# Patient Record
Sex: MALE | Race: WHITE | URBAN - METROPOLITAN AREA
[De-identification: names, ages, dates, MRNs, and addresses within clinical notes are randomized per-mention and may not be internally consistent; named-entity substitution may affect disease eponyms.]

---

## 2018-03-14 ENCOUNTER — DOCTOR'S OFFICE (OUTPATIENT)
Dept: URBAN - METROPOLITAN AREA CLINIC 2 | Facility: CLINIC | Age: 83
Setting detail: OPHTHALMOLOGY
End: 2018-03-14
Payer: COMMERCIAL

## 2018-03-14 ENCOUNTER — RX ONLY (RX ONLY)
Age: 83
End: 2018-03-14

## 2018-03-14 DIAGNOSIS — H34.8110: ICD-10-CM

## 2018-03-14 DIAGNOSIS — H40.1132: ICD-10-CM

## 2018-03-14 DIAGNOSIS — E11.3293: ICD-10-CM

## 2018-03-14 DIAGNOSIS — H25.11: ICD-10-CM

## 2018-03-14 PROCEDURE — 92004 COMPRE OPH EXAM NEW PT 1/>: CPT | Performed by: OPHTHALMOLOGY

## 2018-03-14 PROCEDURE — 92250 FUNDUS PHOTOGRAPHY W/I&R: CPT | Performed by: OPHTHALMOLOGY

## 2018-03-14 PROCEDURE — 92020 GONIOSCOPY: CPT | Performed by: OPHTHALMOLOGY

## 2018-03-14 ASSESSMENT — REFRACTION_MANIFEST
OS_VA1: 20/
OD_VA3: 20/
OD_VA3: 20/
OS_VA2: 20/
OS_CYLINDER: -2.50
OS_AXIS: 85
OD_VA2: 20/
OD_VA1: 20/
OS_VA3: 20/
OD_VA1: 20/
OS_VA3: 20/
OS_VA3: 20/
OS_SPHERE: +2.50
OD_VA2: 20/
OS_VA2: 20/
OS_VA1: 20/40-
OU_VA: 20/
OS_VA1: 20/
OS_VA2: 20/
OD_VA1: 20/
OD_VA2: 20/
OU_VA: 20/
OD_VA3: 20/
OU_VA: 20/

## 2018-03-14 ASSESSMENT — REFRACTION_CURRENTRX
OS_CYLINDER: -2.00
OS_OVR_VA: 20/
OS_ADD: +3.75
OD_OVR_VA: 20/
OS_AXIS: 83
OS_OVR_VA: 20/
OD_CYLINDER: -2.75
OS_SPHERE: +2.25
OD_OVR_VA: 20/
OD_ADD: +3.75
OD_AXIS: 94
OD_OVR_VA: 20/
OD_SPHERE: +1.50
OS_OVR_VA: 20/

## 2018-03-14 ASSESSMENT — CONFRONTATIONAL VISUAL FIELD TEST (CVF)
OD_FINDINGS: FULL
OS_FINDINGS: FULL

## 2018-03-14 ASSESSMENT — VISUAL ACUITY
OS_BCVA: 20/
OD_BCVA: 20/

## 2018-03-14 ASSESSMENT — SPHEQUIV_DERIVED: OS_SPHEQUIV: 1.25

## 2019-04-27 PROBLEM — H34.8110: Status: ACTIVE | Noted: 2018-03-14

## 2019-04-27 PROBLEM — H40.1132 PRIMARY OPEN ANGLE GLAUCOMA; BOTH EYES MODERATE: Status: ACTIVE | Noted: 2018-03-14

## 2019-04-27 PROBLEM — H25.11: Status: ACTIVE | Noted: 2018-03-14

## 2019-04-27 PROBLEM — E11.3293 DM TYPE 2; BOTH MILD WITHOUT ME: Status: ACTIVE | Noted: 2018-03-14

## 2019-05-06 ENCOUNTER — OFFICE (OUTPATIENT)
Dept: URBAN - METROPOLITAN AREA CLINIC 109 | Facility: CLINIC | Age: 84
Setting detail: OPHTHALMOLOGY
End: 2019-05-06
Payer: COMMERCIAL

## 2019-05-06 DIAGNOSIS — H18.59: ICD-10-CM

## 2019-05-06 DIAGNOSIS — E11.3293: ICD-10-CM

## 2019-05-06 DIAGNOSIS — H02.834: ICD-10-CM

## 2019-05-06 DIAGNOSIS — H34.8110: ICD-10-CM

## 2019-05-06 DIAGNOSIS — H40.1132: ICD-10-CM

## 2019-05-06 DIAGNOSIS — H02.835: ICD-10-CM

## 2019-05-06 DIAGNOSIS — H02.401: ICD-10-CM

## 2019-05-06 DIAGNOSIS — H02.831: ICD-10-CM

## 2019-05-06 DIAGNOSIS — H02.832: ICD-10-CM

## 2019-05-06 DIAGNOSIS — H25.11: ICD-10-CM

## 2019-05-06 DIAGNOSIS — H34.8112: ICD-10-CM

## 2019-05-06 PROCEDURE — 92133 CPTRZD OPH DX IMG PST SGM ON: CPT | Performed by: OPHTHALMOLOGY

## 2019-05-06 PROCEDURE — 92083 EXTENDED VISUAL FIELD XM: CPT | Performed by: OPHTHALMOLOGY

## 2019-05-06 PROCEDURE — 92020 GONIOSCOPY: CPT | Performed by: OPHTHALMOLOGY

## 2019-05-06 PROCEDURE — 92014 COMPRE OPH EXAM EST PT 1/>: CPT | Performed by: OPHTHALMOLOGY

## 2019-05-06 PROCEDURE — 76514 ECHO EXAM OF EYE THICKNESS: CPT | Performed by: OPHTHALMOLOGY

## 2019-05-06 PROCEDURE — 92250 FUNDUS PHOTOGRAPHY W/I&R: CPT | Performed by: OPHTHALMOLOGY

## 2019-05-06 ASSESSMENT — REFRACTION_MANIFEST
OU_VA: 20/
OD_VA3: 20/
OD_VA3: 20/
OU_VA: 20/
OS_AXIS: 85
OD_VA1: 20/
OS_VA3: 20/
OS_VA2: 20/
OS_VA3: 20/
OD_VA2: 20/
OS_VA1: 20/
OD_VA2: 20/
OS_CYLINDER: -2.75
OS_VA2: 20/
OD_VA1: 20/
OS_VA1: 20/40-
OS_SPHERE: +2.50

## 2019-05-06 ASSESSMENT — VISUAL ACUITY
OS_BCVA: 20/CF
OD_BCVA: 20/40-2

## 2019-05-06 ASSESSMENT — REFRACTION_CURRENTRX
OS_OVR_VA: 20/
OS_OVR_VA: 20/
OS_ADD: +3.75
OS_AXIS: 83
OD_OVR_VA: 20/
OD_OVR_VA: 20/
OD_SPHERE: +1.50
OD_OVR_VA: 20/
OS_OVR_VA: 20/
OS_CYLINDER: -2.00
OD_AXIS: 94
OD_ADD: +3.75
OS_SPHERE: +2.25
OD_CYLINDER: -2.75

## 2019-05-06 ASSESSMENT — PACHYMETRY
OD_CT_CORRECTION: 1
OS_CT_CORRECTION: 1
OS_CT_UM: 520
OD_CT_UM: 529

## 2019-05-06 ASSESSMENT — SPHEQUIV_DERIVED: OS_SPHEQUIV: 1.125

## 2019-05-06 ASSESSMENT — CONFRONTATIONAL VISUAL FIELD TEST (CVF)
OD_FINDINGS: FULL
OS_FINDINGS: FULL

## 2019-05-06 ASSESSMENT — LID POSITION - COMMENTS
OD_COMMENTS: GOOD LEVATOR FUNCTION
OS_COMMENTS: GOOD LEVATOR FUNCTION

## 2019-05-06 ASSESSMENT — LID POSITION - PTOSIS: OD_PTOSIS: RUL T 1+

## 2019-05-28 ENCOUNTER — RX ONLY (RX ONLY)
Age: 84
End: 2019-05-28

## 2019-05-28 ENCOUNTER — OFFICE (OUTPATIENT)
Dept: URBAN - METROPOLITAN AREA CLINIC 109 | Facility: CLINIC | Age: 84
Setting detail: OPHTHALMOLOGY
End: 2019-05-28
Payer: COMMERCIAL

## 2019-05-28 DIAGNOSIS — H34.8110: ICD-10-CM

## 2019-05-28 DIAGNOSIS — E11.3293: ICD-10-CM

## 2019-05-28 DIAGNOSIS — H40.1132: ICD-10-CM

## 2019-05-28 DIAGNOSIS — H25.11: ICD-10-CM

## 2019-05-28 PROCEDURE — 92014 COMPRE OPH EXAM EST PT 1/>: CPT | Performed by: OPHTHALMOLOGY

## 2019-05-28 ASSESSMENT — LID POSITION - PTOSIS: OD_PTOSIS: RUL T 1+

## 2019-05-28 ASSESSMENT — REFRACTION_CURRENTRX
OD_AXIS: 94
OS_OVR_VA: 20/
OD_OVR_VA: 20/
OS_SPHERE: +2.25
OS_AXIS: 83
OD_SPHERE: +1.50
OS_CYLINDER: -2.00
OD_OVR_VA: 20/
OS_ADD: +3.75
OD_CYLINDER: -2.75
OD_OVR_VA: 20/
OD_ADD: +3.75
OS_OVR_VA: 20/
OS_OVR_VA: 20/

## 2019-05-28 ASSESSMENT — REFRACTION_MANIFEST
OD_VA1: 20/
OU_VA: 20/
OD_VA2: 20/
OS_VA1: 20/40-
OS_VA2: 20/
OS_CYLINDER: -2.75
OS_VA2: 20/
OD_VA2: 20/
OS_SPHERE: +2.50
OD_VA3: 20/
OS_VA1: 20/
OS_AXIS: 85
OS_VA3: 20/
OU_VA: 20/
OS_VA3: 20/
OD_VA3: 20/
OD_VA1: 20/

## 2019-05-28 ASSESSMENT — SPHEQUIV_DERIVED: OS_SPHEQUIV: 1.125

## 2019-05-28 ASSESSMENT — LID POSITION - COMMENTS
OD_COMMENTS: GOOD LEVATOR FUNCTION
OS_COMMENTS: GOOD LEVATOR FUNCTION

## 2019-05-28 ASSESSMENT — CONFRONTATIONAL VISUAL FIELD TEST (CVF)
OS_FINDINGS: FULL
OD_FINDINGS: FULL

## 2019-05-28 ASSESSMENT — VISUAL ACUITY
OD_BCVA: 20/50
OS_BCVA: 20/CF

## 2019-06-25 ENCOUNTER — OFFICE (OUTPATIENT)
Dept: URBAN - METROPOLITAN AREA CLINIC 109 | Facility: CLINIC | Age: 84
Setting detail: OPHTHALMOLOGY
End: 2019-06-25
Payer: COMMERCIAL

## 2019-06-25 DIAGNOSIS — H18.59: ICD-10-CM

## 2019-06-25 DIAGNOSIS — H02.832: ICD-10-CM

## 2019-06-25 DIAGNOSIS — H25.11: ICD-10-CM

## 2019-06-25 DIAGNOSIS — H02.835: ICD-10-CM

## 2019-06-25 DIAGNOSIS — H40.1132: ICD-10-CM

## 2019-06-25 DIAGNOSIS — E11.3293: ICD-10-CM

## 2019-06-25 DIAGNOSIS — H02.834: ICD-10-CM

## 2019-06-25 DIAGNOSIS — H02.831: ICD-10-CM

## 2019-06-25 DIAGNOSIS — H02.401: ICD-10-CM

## 2019-06-25 DIAGNOSIS — H34.8110: ICD-10-CM

## 2019-06-25 PROCEDURE — 92014 COMPRE OPH EXAM EST PT 1/>: CPT | Performed by: OPHTHALMOLOGY

## 2019-06-25 ASSESSMENT — REFRACTION_MANIFEST
OS_SPHERE: +2.50
OD_VA3: 20/
OS_VA2: 20/
OS_VA3: 20/
OD_VA2: 20/
OD_VA2: 20/
OD_VA1: 20/
OS_VA3: 20/
OS_CYLINDER: -2.75
OU_VA: 20/
OS_AXIS: 85
OS_VA1: 20/40-
OD_VA1: 20/
OS_VA1: 20/
OU_VA: 20/
OD_VA3: 20/
OS_VA2: 20/

## 2019-06-25 ASSESSMENT — REFRACTION_CURRENTRX
OS_ADD: +3.75
OD_SPHERE: +1.50
OD_OVR_VA: 20/
OD_OVR_VA: 20/
OD_AXIS: 94
OS_SPHERE: +2.25
OD_CYLINDER: -2.75
OS_OVR_VA: 20/
OD_ADD: +3.75
OS_OVR_VA: 20/
OD_OVR_VA: 20/
OS_OVR_VA: 20/
OS_AXIS: 83
OS_CYLINDER: -2.00

## 2019-06-25 ASSESSMENT — LID POSITION - PTOSIS: OD_PTOSIS: RUL T 1+

## 2019-06-25 ASSESSMENT — CONFRONTATIONAL VISUAL FIELD TEST (CVF)
OD_FINDINGS: FULL
OS_FINDINGS: FULL

## 2019-06-25 ASSESSMENT — VISUAL ACUITY
OD_BCVA: 20/50+2
OS_BCVA: CF 2FT

## 2019-06-25 ASSESSMENT — SPHEQUIV_DERIVED: OS_SPHEQUIV: 1.125

## 2019-06-25 ASSESSMENT — LID POSITION - COMMENTS
OS_COMMENTS: GOOD LEVATOR FUNCTION
OD_COMMENTS: GOOD LEVATOR FUNCTION

## 2019-08-27 ENCOUNTER — OFFICE (OUTPATIENT)
Dept: URBAN - METROPOLITAN AREA CLINIC 109 | Facility: CLINIC | Age: 84
Setting detail: OPHTHALMOLOGY
End: 2019-08-27
Payer: COMMERCIAL

## 2019-08-27 DIAGNOSIS — H34.8110: ICD-10-CM

## 2019-08-27 DIAGNOSIS — H02.834: ICD-10-CM

## 2019-08-27 DIAGNOSIS — H02.401: ICD-10-CM

## 2019-08-27 DIAGNOSIS — H40.1132: ICD-10-CM

## 2019-08-27 DIAGNOSIS — H25.11: ICD-10-CM

## 2019-08-27 DIAGNOSIS — H18.59: ICD-10-CM

## 2019-08-27 DIAGNOSIS — H02.832: ICD-10-CM

## 2019-08-27 DIAGNOSIS — H02.835: ICD-10-CM

## 2019-08-27 DIAGNOSIS — H02.831: ICD-10-CM

## 2019-08-27 DIAGNOSIS — E11.3293: ICD-10-CM

## 2019-08-27 PROCEDURE — 92012 INTRM OPH EXAM EST PATIENT: CPT | Performed by: OPHTHALMOLOGY

## 2019-08-27 ASSESSMENT — CONFRONTATIONAL VISUAL FIELD TEST (CVF)
OD_FINDINGS: FULL
OS_FINDINGS: FULL

## 2019-08-27 ASSESSMENT — REFRACTION_CURRENTRX
OD_OVR_VA: 20/
OS_OVR_VA: 20/
OD_ADD: +3.75
OS_AXIS: 83
OS_ADD: +3.75
OS_CYLINDER: -2.00
OS_OVR_VA: 20/
OS_OVR_VA: 20/
OD_CYLINDER: -2.75
OD_AXIS: 94
OD_OVR_VA: 20/
OS_SPHERE: +2.25
OD_SPHERE: +1.50
OD_OVR_VA: 20/

## 2019-08-27 ASSESSMENT — REFRACTION_MANIFEST
OS_VA3: 20/
OD_VA2: 20/
OS_VA1: 20/
OD_VA1: 20/
OS_VA2: 20/
OU_VA: 20/
OU_VA: 20/
OS_AXIS: 85
OD_VA3: 20/
OS_VA3: 20/
OS_VA1: 20/40-
OS_VA2: 20/
OD_VA2: 20/
OS_SPHERE: +2.50
OS_CYLINDER: -2.75
OD_VA1: 20/
OD_VA3: 20/

## 2019-08-27 ASSESSMENT — VISUAL ACUITY
OD_BCVA: 20/50+2
OS_BCVA: CF 2FT

## 2019-08-27 ASSESSMENT — LID POSITION - PTOSIS: OD_PTOSIS: RUL T 1+

## 2019-08-27 ASSESSMENT — LID POSITION - COMMENTS
OD_COMMENTS: GOOD LEVATOR FUNCTION
OS_COMMENTS: GOOD LEVATOR FUNCTION

## 2019-08-27 ASSESSMENT — SPHEQUIV_DERIVED: OS_SPHEQUIV: 1.125

## 2019-10-29 ENCOUNTER — RX ONLY (RX ONLY)
Age: 84
End: 2019-10-29

## 2019-10-29 ENCOUNTER — OFFICE (OUTPATIENT)
Dept: URBAN - METROPOLITAN AREA CLINIC 109 | Facility: CLINIC | Age: 84
Setting detail: OPHTHALMOLOGY
End: 2019-10-29
Payer: COMMERCIAL

## 2019-10-29 DIAGNOSIS — H40.1132: ICD-10-CM

## 2019-10-29 DIAGNOSIS — H52.13: ICD-10-CM

## 2019-10-29 DIAGNOSIS — H34.8110: ICD-10-CM

## 2019-10-29 DIAGNOSIS — H52.4: ICD-10-CM

## 2019-10-29 DIAGNOSIS — H02.831: ICD-10-CM

## 2019-10-29 DIAGNOSIS — H02.835: ICD-10-CM

## 2019-10-29 DIAGNOSIS — H18.59: ICD-10-CM

## 2019-10-29 DIAGNOSIS — H25.11: ICD-10-CM

## 2019-10-29 DIAGNOSIS — E11.3293: ICD-10-CM

## 2019-10-29 DIAGNOSIS — H02.834: ICD-10-CM

## 2019-10-29 DIAGNOSIS — H02.832: ICD-10-CM

## 2019-10-29 DIAGNOSIS — H02.401: ICD-10-CM

## 2019-10-29 PROCEDURE — 92015 DETERMINE REFRACTIVE STATE: CPT | Performed by: OPHTHALMOLOGY

## 2019-10-29 PROCEDURE — 92012 INTRM OPH EXAM EST PATIENT: CPT | Performed by: OPHTHALMOLOGY

## 2019-10-29 ASSESSMENT — REFRACTION_CURRENTRX
OS_CYLINDER: -2.00
OS_SPHERE: +2.25
OD_OVR_VA: 20/
OD_ADD: +3.75
OS_ADD: +3.75
OD_CYLINDER: -2.75
OD_SPHERE: +1.50
OD_AXIS: 94
OS_OVR_VA: 20/
OS_AXIS: 83

## 2019-10-29 ASSESSMENT — REFRACTION_MANIFEST
OS_VA2: 20/
OS_CYLINDER: -2.75
OU_VA: 20/
OD_VA1: 20/
OS_AXIS: 85
OD_SPHERE: BALANCE
OD_VA2: 20/
OS_VA1: 20/40
OD_VA3: 20/
OD_ADD: +3.75
OS_VA3: 20/
OS_SPHERE: +2.50
OS_ADD: +3.75

## 2019-10-29 ASSESSMENT — VISUAL ACUITY
OS_BCVA: CF 1FT
OD_BCVA: 20/60-2

## 2019-10-29 ASSESSMENT — LID POSITION - COMMENTS
OS_COMMENTS: GOOD LEVATOR FUNCTION
OD_COMMENTS: GOOD LEVATOR FUNCTION

## 2019-10-29 ASSESSMENT — LID POSITION - PTOSIS: OD_PTOSIS: RUL T 1+

## 2019-10-29 ASSESSMENT — CONFRONTATIONAL VISUAL FIELD TEST (CVF)
OS_FINDINGS: FULL
OD_FINDINGS: FULL

## 2019-10-29 ASSESSMENT — SPHEQUIV_DERIVED: OS_SPHEQUIV: 1.125

## 2019-10-31 ENCOUNTER — RX ONLY (RX ONLY)
Age: 84
End: 2019-10-31

## 2019-10-31 ENCOUNTER — OFFICE (OUTPATIENT)
Dept: URBAN - METROPOLITAN AREA CLINIC 109 | Facility: CLINIC | Age: 84
Setting detail: OPHTHALMOLOGY
End: 2019-10-31
Payer: COMMERCIAL

## 2019-10-31 DIAGNOSIS — H02.401: ICD-10-CM

## 2019-10-31 DIAGNOSIS — H02.831: ICD-10-CM

## 2019-10-31 DIAGNOSIS — H40.1132: ICD-10-CM

## 2019-10-31 DIAGNOSIS — H18.59: ICD-10-CM

## 2019-10-31 DIAGNOSIS — H25.11: ICD-10-CM

## 2019-10-31 DIAGNOSIS — H02.832: ICD-10-CM

## 2019-10-31 DIAGNOSIS — H34.8110: ICD-10-CM

## 2019-10-31 DIAGNOSIS — E11.3293: ICD-10-CM

## 2019-10-31 DIAGNOSIS — H02.834: ICD-10-CM

## 2019-10-31 DIAGNOSIS — H02.835: ICD-10-CM

## 2019-10-31 DIAGNOSIS — H52.13: ICD-10-CM

## 2019-10-31 PROCEDURE — 92012 INTRM OPH EXAM EST PATIENT: CPT | Performed by: OPHTHALMOLOGY

## 2019-10-31 ASSESSMENT — REFRACTION_CURRENTRX
OS_SPHERE: +2.25
OD_AXIS: 94
OS_CYLINDER: -2.00
OD_ADD: +3.75
OS_AXIS: 83
OD_OVR_VA: 20/
OS_ADD: +3.75
OS_OVR_VA: 20/
OD_SPHERE: +1.50
OD_CYLINDER: -2.75

## 2019-10-31 ASSESSMENT — LID POSITION - COMMENTS
OS_COMMENTS: GOOD LEVATOR FUNCTION
OD_COMMENTS: GOOD LEVATOR FUNCTION

## 2019-10-31 ASSESSMENT — REFRACTION_MANIFEST
OD_VA3: 20/
OD_VA1: 20/
OS_CYLINDER: -2.75
OS_VA2: 20/
OD_ADD: +3.75
OS_AXIS: 85
OS_ADD: +3.75
OD_VA2: 20/
OU_VA: 20/
OD_SPHERE: BALANCE
OS_VA3: 20/
OS_SPHERE: +2.50
OS_VA1: 20/40

## 2019-10-31 ASSESSMENT — CONFRONTATIONAL VISUAL FIELD TEST (CVF)
OS_FINDINGS: FULL
OD_FINDINGS: FULL

## 2019-10-31 ASSESSMENT — VISUAL ACUITY
OS_BCVA: CF 1FT
OD_BCVA: 20/40-1

## 2019-10-31 ASSESSMENT — SPHEQUIV_DERIVED: OS_SPHEQUIV: 1.125

## 2019-10-31 ASSESSMENT — LID POSITION - PTOSIS: OD_PTOSIS: RUL T 1+

## 2019-11-14 ENCOUNTER — RX ONLY (RX ONLY)
Age: 84
End: 2019-11-14

## 2019-11-14 ENCOUNTER — OFFICE (OUTPATIENT)
Dept: URBAN - METROPOLITAN AREA CLINIC 109 | Facility: CLINIC | Age: 84
Setting detail: OPHTHALMOLOGY
End: 2019-11-14
Payer: COMMERCIAL

## 2019-11-14 VITALS — HEIGHT: 60 IN

## 2019-11-14 DIAGNOSIS — E11.3293: ICD-10-CM

## 2019-11-14 DIAGNOSIS — H34.8110: ICD-10-CM

## 2019-11-14 DIAGNOSIS — H25.11: ICD-10-CM

## 2019-11-14 DIAGNOSIS — H40.1132: ICD-10-CM

## 2019-11-14 PROCEDURE — 92012 INTRM OPH EXAM EST PATIENT: CPT | Performed by: OPHTHALMOLOGY

## 2019-11-14 ASSESSMENT — LID POSITION - PTOSIS: OD_PTOSIS: RUL T 1+

## 2019-11-14 ASSESSMENT — REFRACTION_MANIFEST
OS_ADD: +3.75
OD_ADD: +3.75
OS_VA2: 20/
OU_VA: 20/
OS_SPHERE: +2.50
OS_VA3: 20/
OD_SPHERE: BALANCE
OS_AXIS: 85
OD_VA2: 20/
OD_VA3: 20/
OD_VA1: 20/
OS_VA1: 20/40
OS_CYLINDER: -2.75

## 2019-11-14 ASSESSMENT — LID POSITION - COMMENTS
OS_COMMENTS: GOOD LEVATOR FUNCTION
OD_COMMENTS: GOOD LEVATOR FUNCTION

## 2019-11-14 ASSESSMENT — REFRACTION_CURRENTRX
OD_AXIS: 94
OD_SPHERE: +1.50
OD_OVR_VA: 20/
OS_ADD: +3.75
OS_CYLINDER: -2.00
OD_ADD: +3.75
OS_SPHERE: +2.25
OD_CYLINDER: -2.75
OS_AXIS: 83
OS_OVR_VA: 20/

## 2019-11-14 ASSESSMENT — CONFRONTATIONAL VISUAL FIELD TEST (CVF)
OS_FINDINGS: FULL
OD_FINDINGS: FULL

## 2019-11-14 ASSESSMENT — VISUAL ACUITY
OD_BCVA: 20/50
OS_BCVA: CF 1FT

## 2019-11-14 ASSESSMENT — SPHEQUIV_DERIVED: OS_SPHEQUIV: 1.125

## 2019-12-04 ENCOUNTER — OFFICE (OUTPATIENT)
Dept: URBAN - METROPOLITAN AREA CLINIC 109 | Facility: CLINIC | Age: 84
Setting detail: OPHTHALMOLOGY
End: 2019-12-04
Payer: COMMERCIAL

## 2019-12-04 VITALS — HEIGHT: 60 IN

## 2019-12-04 DIAGNOSIS — E11.3293: ICD-10-CM

## 2019-12-04 DIAGNOSIS — H18.59: ICD-10-CM

## 2019-12-04 DIAGNOSIS — H34.8110: ICD-10-CM

## 2019-12-04 DIAGNOSIS — H25.11: ICD-10-CM

## 2019-12-04 DIAGNOSIS — H52.13: ICD-10-CM

## 2019-12-04 DIAGNOSIS — H02.401: ICD-10-CM

## 2019-12-04 DIAGNOSIS — H40.1132: ICD-10-CM

## 2019-12-04 DIAGNOSIS — H02.831: ICD-10-CM

## 2019-12-04 DIAGNOSIS — H02.832: ICD-10-CM

## 2019-12-04 DIAGNOSIS — H02.834: ICD-10-CM

## 2019-12-04 DIAGNOSIS — H02.835: ICD-10-CM

## 2019-12-04 PROCEDURE — 99213 OFFICE O/P EST LOW 20 MIN: CPT | Performed by: OPHTHALMOLOGY

## 2019-12-04 ASSESSMENT — REFRACTION_CURRENTRX
OS_AXIS: 83
OS_CYLINDER: -2.00
OD_ADD: +3.75
OD_CYLINDER: -2.75
OD_SPHERE: +1.50
OS_SPHERE: +2.25
OD_AXIS: 94
OS_OVR_VA: 20/
OD_OVR_VA: 20/
OS_ADD: +3.75

## 2019-12-04 ASSESSMENT — VISUAL ACUITY
OS_BCVA: CF 1FT
OD_BCVA: 20/40-2

## 2019-12-04 ASSESSMENT — CONFRONTATIONAL VISUAL FIELD TEST (CVF)
OS_FINDINGS: FULL
OD_FINDINGS: FULL

## 2019-12-04 ASSESSMENT — LID POSITION - PTOSIS: OD_PTOSIS: RUL T 1+

## 2019-12-04 ASSESSMENT — REFRACTION_MANIFEST
OD_VA3: 20/
OD_ADD: +3.75
OD_VA1: 20/
OS_AXIS: 85
OS_VA1: 20/40
OS_VA3: 20/
OS_VA2: 20/
OD_VA2: 20/
OD_SPHERE: BALANCE
OS_SPHERE: +2.50
OS_CYLINDER: -2.75
OS_ADD: +3.75
OU_VA: 20/

## 2019-12-04 ASSESSMENT — SPHEQUIV_DERIVED: OS_SPHEQUIV: 1.125

## 2019-12-04 ASSESSMENT — LID POSITION - COMMENTS
OS_COMMENTS: GOOD LEVATOR FUNCTION
OD_COMMENTS: GOOD LEVATOR FUNCTION

## 2019-12-11 ENCOUNTER — OFFICE (OUTPATIENT)
Dept: URBAN - METROPOLITAN AREA CLINIC 109 | Facility: CLINIC | Age: 84
Setting detail: OPHTHALMOLOGY
End: 2019-12-11
Payer: COMMERCIAL

## 2019-12-11 DIAGNOSIS — H34.8110: ICD-10-CM

## 2019-12-11 DIAGNOSIS — H40.1132: ICD-10-CM

## 2019-12-11 DIAGNOSIS — H25.11: ICD-10-CM

## 2019-12-11 DIAGNOSIS — E11.3293: ICD-10-CM

## 2019-12-11 PROCEDURE — 99213 OFFICE O/P EST LOW 20 MIN: CPT | Performed by: OPHTHALMOLOGY

## 2019-12-11 ASSESSMENT — REFRACTION_MANIFEST
OD_VA1: 20/
OS_ADD: +3.75
OD_VA3: 20/
OD_VA2: 20/
OS_SPHERE: +2.50
OS_CYLINDER: -2.75
OD_ADD: +3.75
OU_VA: 20/
OS_VA1: 20/40
OS_VA2: 20/
OD_SPHERE: BALANCE
OS_AXIS: 85
OS_VA3: 20/

## 2019-12-11 ASSESSMENT — REFRACTION_CURRENTRX
OD_CYLINDER: -2.75
OS_CYLINDER: -2.00
OD_OVR_VA: 20/
OD_SPHERE: +1.50
OS_AXIS: 83
OS_OVR_VA: 20/
OS_ADD: +3.75
OD_AXIS: 94
OS_SPHERE: +2.25
OD_ADD: +3.75

## 2019-12-11 ASSESSMENT — LID POSITION - COMMENTS
OD_COMMENTS: GOOD LEVATOR FUNCTION
OS_COMMENTS: GOOD LEVATOR FUNCTION

## 2019-12-11 ASSESSMENT — SPHEQUIV_DERIVED: OS_SPHEQUIV: 1.125

## 2019-12-11 ASSESSMENT — VISUAL ACUITY
OD_BCVA: 20/40-2
OS_BCVA: CF 1FT

## 2019-12-11 ASSESSMENT — LID POSITION - PTOSIS: OD_PTOSIS: RUL T 1+

## 2019-12-11 ASSESSMENT — CONFRONTATIONAL VISUAL FIELD TEST (CVF)
OD_FINDINGS: FULL
OS_FINDINGS: FULL

## 2020-08-25 ENCOUNTER — OFFICE (OUTPATIENT)
Dept: URBAN - METROPOLITAN AREA CLINIC 109 | Facility: CLINIC | Age: 85
Setting detail: OPHTHALMOLOGY
End: 2020-08-25
Payer: COMMERCIAL

## 2020-08-25 VITALS — HEIGHT: 60 IN

## 2020-08-25 DIAGNOSIS — E11.3293: ICD-10-CM

## 2020-08-25 DIAGNOSIS — H25.11: ICD-10-CM

## 2020-08-25 DIAGNOSIS — H34.8110: ICD-10-CM

## 2020-08-25 DIAGNOSIS — H40.1132: ICD-10-CM

## 2020-08-25 PROBLEM — H02.834 DERMATOCHALASIS; RIGHT UPPER LID, RIGHT LOWER LID, LEFT UPPER LID, LEFT LOWER LID: Status: ACTIVE | Noted: 2019-05-06

## 2020-08-25 PROBLEM — H02.832 DERMATOCHALASIS; RIGHT UPPER LID, RIGHT LOWER LID, LEFT UPPER LID, LEFT LOWER LID: Status: ACTIVE | Noted: 2019-05-06

## 2020-08-25 PROBLEM — H02.835 DERMATOCHALASIS; RIGHT UPPER LID, RIGHT LOWER LID, LEFT UPPER LID, LEFT LOWER LID: Status: ACTIVE | Noted: 2019-05-06

## 2020-08-25 PROBLEM — H52.13 MYOPIA; BOTH EYES: Status: ACTIVE | Noted: 2019-10-29

## 2020-08-25 PROBLEM — H26.492 POSTERIOR CAPSULAR OPACIFICATION; LEFT EYE: Status: ACTIVE | Noted: 2019-05-06

## 2020-08-25 PROBLEM — H02.831 DERMATOCHALASIS; RIGHT UPPER LID, RIGHT LOWER LID, LEFT UPPER LID, LEFT LOWER LID: Status: ACTIVE | Noted: 2019-05-06

## 2020-08-25 PROBLEM — H02.401 PTOSIS OF EYELID, UNSPECIFIED; RIGHT EYE: Status: ACTIVE | Noted: 2019-05-06

## 2020-08-25 PROBLEM — H18.59 MAP DOT DYSTROPHY ; BOTH EYES: Status: ACTIVE | Noted: 2019-05-06

## 2020-08-25 PROCEDURE — 92020 GONIOSCOPY: CPT | Performed by: OPHTHALMOLOGY

## 2020-08-25 PROCEDURE — 92014 COMPRE OPH EXAM EST PT 1/>: CPT | Performed by: OPHTHALMOLOGY

## 2020-08-25 PROCEDURE — 92133 CPTRZD OPH DX IMG PST SGM ON: CPT | Performed by: OPHTHALMOLOGY

## 2020-08-25 PROCEDURE — 92134 CPTRZ OPH DX IMG PST SGM RTA: CPT | Performed by: OPHTHALMOLOGY

## 2020-08-25 PROCEDURE — 92083 EXTENDED VISUAL FIELD XM: CPT | Performed by: OPHTHALMOLOGY

## 2020-08-25 ASSESSMENT — PACHYMETRY
OD_CT_UM: 529
OS_CT_UM: 520
OS_CT_CORRECTION: 1
OD_CT_CORRECTION: 1

## 2020-08-25 ASSESSMENT — TONOMETRY
OS_IOP_MMHG: 15
OD_IOP_MMHG: 15

## 2020-08-25 ASSESSMENT — REFRACTION_CURRENTRX
OD_OVR_VA: 20/
OD_CYLINDER: -2.75
OD_ADD: +3.75
OD_AXIS: 94
OS_AXIS: 83
OS_ADD: +3.75
OS_OVR_VA: 20/
OD_SPHERE: +1.50
OS_CYLINDER: -2.00
OS_SPHERE: +2.25

## 2020-08-25 ASSESSMENT — REFRACTION_MANIFEST
OD_SPHERE: BALANCE
OS_AXIS: 85
OS_VA1: 20/40-
OD_ADD: +3.75
OS_ADD: +3.75
OS_CYLINDER: -2.75
OS_SPHERE: +2.50

## 2020-08-25 ASSESSMENT — LID POSITION - COMMENTS
OS_COMMENTS: GOOD LEVATOR FUNCTION
OD_COMMENTS: GOOD LEVATOR FUNCTION

## 2020-08-25 ASSESSMENT — SPHEQUIV_DERIVED: OS_SPHEQUIV: 1.125

## 2020-08-25 ASSESSMENT — LID POSITION - PTOSIS: OD_PTOSIS: RUL T 1+

## 2020-08-25 ASSESSMENT — CONFRONTATIONAL VISUAL FIELD TEST (CVF)
OD_FINDINGS: FULL
OS_FINDINGS: FULL

## 2020-08-25 ASSESSMENT — VISUAL ACUITY
OD_BCVA: 20/50-1
OS_BCVA: CF 1FT

## 2020-11-18 ENCOUNTER — APPOINTMENT (OUTPATIENT)
Dept: CARDIOLOGY | Facility: CLINIC | Age: 85
End: 2020-11-18
Payer: MEDICARE

## 2020-11-18 ENCOUNTER — RX RENEWAL (OUTPATIENT)
Age: 85
End: 2020-11-18

## 2020-11-18 ENCOUNTER — NON-APPOINTMENT (OUTPATIENT)
Age: 85
End: 2020-11-18

## 2020-11-18 VITALS
HEIGHT: 67.35 IN | HEART RATE: 105 BPM | BODY MASS INDEX: 28.54 KG/M2 | OXYGEN SATURATION: 95 % | WEIGHT: 184 LBS | DIASTOLIC BLOOD PRESSURE: 66 MMHG | SYSTOLIC BLOOD PRESSURE: 185 MMHG

## 2020-11-18 PROCEDURE — 93000 ELECTROCARDIOGRAM COMPLETE: CPT

## 2020-11-18 PROCEDURE — 99215 OFFICE O/P EST HI 40 MIN: CPT

## 2020-11-23 ENCOUNTER — APPOINTMENT (OUTPATIENT)
Dept: CARDIOLOGY | Facility: CLINIC | Age: 85
End: 2020-11-23
Payer: MEDICARE

## 2020-11-23 PROCEDURE — 99072 ADDL SUPL MATRL&STAF TM PHE: CPT

## 2020-12-02 PROCEDURE — 93224 XTRNL ECG REC UP TO 48 HRS: CPT

## 2020-12-11 ENCOUNTER — APPOINTMENT (OUTPATIENT)
Dept: CARDIOLOGY | Facility: CLINIC | Age: 85
End: 2020-12-11
Payer: MEDICARE

## 2020-12-11 PROCEDURE — 93015 CV STRESS TEST SUPVJ I&R: CPT

## 2020-12-11 PROCEDURE — 78452 HT MUSCLE IMAGE SPECT MULT: CPT

## 2020-12-11 PROCEDURE — 99072 ADDL SUPL MATRL&STAF TM PHE: CPT

## 2020-12-11 PROCEDURE — A9500: CPT

## 2020-12-18 ENCOUNTER — APPOINTMENT (OUTPATIENT)
Dept: CARDIOLOGY | Facility: CLINIC | Age: 85
End: 2020-12-18
Payer: MEDICARE

## 2020-12-18 PROCEDURE — 93306 TTE W/DOPPLER COMPLETE: CPT

## 2020-12-18 PROCEDURE — 93880 EXTRACRANIAL BILAT STUDY: CPT

## 2020-12-18 PROCEDURE — 99072 ADDL SUPL MATRL&STAF TM PHE: CPT

## 2020-12-21 ENCOUNTER — NON-APPOINTMENT (OUTPATIENT)
Age: 85
End: 2020-12-21

## 2020-12-21 ENCOUNTER — APPOINTMENT (OUTPATIENT)
Dept: CARDIOLOGY | Facility: CLINIC | Age: 85
End: 2020-12-21
Payer: MEDICARE

## 2020-12-21 ENCOUNTER — RX RENEWAL (OUTPATIENT)
Age: 85
End: 2020-12-21

## 2020-12-21 VITALS
SYSTOLIC BLOOD PRESSURE: 148 MMHG | OXYGEN SATURATION: 98 % | HEART RATE: 98 BPM | BODY MASS INDEX: 27.43 KG/M2 | DIASTOLIC BLOOD PRESSURE: 71 MMHG | WEIGHT: 177 LBS

## 2020-12-21 PROCEDURE — 99072 ADDL SUPL MATRL&STAF TM PHE: CPT

## 2020-12-21 PROCEDURE — 99215 OFFICE O/P EST HI 40 MIN: CPT

## 2020-12-21 PROCEDURE — 93000 ELECTROCARDIOGRAM COMPLETE: CPT

## 2020-12-21 RX ORDER — GLIMEPIRIDE 4 MG/1
4 TABLET ORAL
Qty: 180 | Refills: 0 | Status: ACTIVE | COMMUNITY
Start: 2020-09-04

## 2021-01-16 LAB
ALBUMIN SERPL ELPH-MCNC: 4.5 G/DL
ALP BLD-CCNC: 137 U/L
ALT SERPL-CCNC: 19 U/L
ANION GAP SERPL CALC-SCNC: 15 MMOL/L
AST SERPL-CCNC: 24 U/L
BASOPHILS # BLD AUTO: 0.03 K/UL
BASOPHILS NFR BLD AUTO: 0.6 %
BILIRUB SERPL-MCNC: 1.1 MG/DL
BUN SERPL-MCNC: 27 MG/DL
CALCIUM SERPL-MCNC: 9.7 MG/DL
CHLORIDE SERPL-SCNC: 101 MMOL/L
CHOLEST SERPL-MCNC: 161 MG/DL
CO2 SERPL-SCNC: 21 MMOL/L
CREAT SERPL-MCNC: 1.46 MG/DL
EOSINOPHIL # BLD AUTO: 0.02 K/UL
EOSINOPHIL NFR BLD AUTO: 0.4 %
ESTIMATED AVERAGE GLUCOSE: 171 MG/DL
GLUCOSE SERPL-MCNC: 183 MG/DL
HBA1C MFR BLD HPLC: 7.6 %
HCT VFR BLD CALC: 32.4 %
HDLC SERPL-MCNC: 39 MG/DL
HGB BLD-MCNC: 10.4 G/DL
IMM GRANULOCYTES NFR BLD AUTO: 0.4 %
LDLC SERPL CALC-MCNC: 105 MG/DL
LYMPHOCYTES # BLD AUTO: 1.1 K/UL
LYMPHOCYTES NFR BLD AUTO: 22.3 %
MAN DIFF?: NORMAL
MCHC RBC-ENTMCNC: 30.1 PG
MCHC RBC-ENTMCNC: 32.1 GM/DL
MCV RBC AUTO: 93.6 FL
MONOCYTES # BLD AUTO: 0.49 K/UL
MONOCYTES NFR BLD AUTO: 9.9 %
NEUTROPHILS # BLD AUTO: 3.27 K/UL
NEUTROPHILS NFR BLD AUTO: 66.4 %
NONHDLC SERPL-MCNC: 123 MG/DL
PLATELET # BLD AUTO: 193 K/UL
POTASSIUM SERPL-SCNC: 4.1 MMOL/L
PROT SERPL-MCNC: 7.2 G/DL
RBC # BLD: 3.46 M/UL
RBC # FLD: 13.9 %
SODIUM SERPL-SCNC: 137 MMOL/L
TRIGL SERPL-MCNC: 86 MG/DL
TSH SERPL-ACNC: 2.82 UIU/ML
WBC # FLD AUTO: 4.93 K/UL

## 2021-01-20 ENCOUNTER — APPOINTMENT (OUTPATIENT)
Dept: CARDIOLOGY | Facility: CLINIC | Age: 86
End: 2021-01-20
Payer: MEDICARE

## 2021-01-20 ENCOUNTER — NON-APPOINTMENT (OUTPATIENT)
Age: 86
End: 2021-01-20

## 2021-01-20 VITALS — SYSTOLIC BLOOD PRESSURE: 136 MMHG | HEART RATE: 100 BPM | DIASTOLIC BLOOD PRESSURE: 68 MMHG

## 2021-01-20 VITALS — BODY MASS INDEX: 28.54 KG/M2 | HEIGHT: 67.35 IN | OXYGEN SATURATION: 97 % | HEART RATE: 114 BPM | WEIGHT: 184 LBS

## 2021-01-20 PROCEDURE — 99072 ADDL SUPL MATRL&STAF TM PHE: CPT

## 2021-01-20 PROCEDURE — 99215 OFFICE O/P EST HI 40 MIN: CPT

## 2021-01-20 PROCEDURE — 93000 ELECTROCARDIOGRAM COMPLETE: CPT

## 2021-01-27 ENCOUNTER — NON-APPOINTMENT (OUTPATIENT)
Age: 86
End: 2021-01-27

## 2021-01-27 ENCOUNTER — APPOINTMENT (OUTPATIENT)
Dept: CARDIOLOGY | Facility: CLINIC | Age: 86
End: 2021-01-27
Payer: MEDICARE

## 2021-01-27 VITALS
DIASTOLIC BLOOD PRESSURE: 73 MMHG | HEART RATE: 87 BPM | BODY MASS INDEX: 29.22 KG/M2 | OXYGEN SATURATION: 98 % | SYSTOLIC BLOOD PRESSURE: 137 MMHG | WEIGHT: 184 LBS | HEIGHT: 66.5 IN

## 2021-01-27 DIAGNOSIS — D64.9 ANEMIA, UNSPECIFIED: ICD-10-CM

## 2021-01-27 DIAGNOSIS — R53.83 OTHER FATIGUE: ICD-10-CM

## 2021-01-27 DIAGNOSIS — E78.5 HYPERLIPIDEMIA, UNSPECIFIED: ICD-10-CM

## 2021-01-27 LAB
ANION GAP SERPL CALC-SCNC: 14 MMOL/L
BASOPHILS # BLD AUTO: 0.03 K/UL
BASOPHILS NFR BLD AUTO: 0.7 %
BUN SERPL-MCNC: 27 MG/DL
CALCIUM SERPL-MCNC: 9.7 MG/DL
CHLORIDE SERPL-SCNC: 103 MMOL/L
CO2 SERPL-SCNC: 21 MMOL/L
CREAT SERPL-MCNC: 1.48 MG/DL
EOSINOPHIL # BLD AUTO: 0.06 K/UL
EOSINOPHIL NFR BLD AUTO: 1.3 %
GLUCOSE SERPL-MCNC: 155 MG/DL
HCT VFR BLD CALC: 32.6 %
HGB BLD-MCNC: 10.3 G/DL
IMM GRANULOCYTES NFR BLD AUTO: 0.4 %
LYMPHOCYTES # BLD AUTO: 1 K/UL
LYMPHOCYTES NFR BLD AUTO: 22.1 %
MAN DIFF?: NORMAL
MCHC RBC-ENTMCNC: 29.5 PG
MCHC RBC-ENTMCNC: 31.6 GM/DL
MCV RBC AUTO: 93.4 FL
MONOCYTES # BLD AUTO: 0.42 K/UL
MONOCYTES NFR BLD AUTO: 9.3 %
NEUTROPHILS # BLD AUTO: 2.99 K/UL
NEUTROPHILS NFR BLD AUTO: 66.2 %
PLATELET # BLD AUTO: 197 K/UL
POTASSIUM SERPL-SCNC: 4.4 MMOL/L
RBC # BLD: 3.49 M/UL
RBC # FLD: 14.5 %
SODIUM SERPL-SCNC: 138 MMOL/L
WBC # FLD AUTO: 4.52 K/UL

## 2021-01-27 PROCEDURE — 99215 OFFICE O/P EST HI 40 MIN: CPT

## 2021-01-27 PROCEDURE — 99072 ADDL SUPL MATRL&STAF TM PHE: CPT

## 2021-01-27 PROCEDURE — 93000 ELECTROCARDIOGRAM COMPLETE: CPT

## 2021-02-05 ENCOUNTER — NON-APPOINTMENT (OUTPATIENT)
Age: 86
End: 2021-02-05

## 2021-02-21 ENCOUNTER — EMERGENCY (EMERGENCY)
Facility: HOSPITAL | Age: 86
LOS: 1 days | Discharge: HOME | End: 2021-02-21
Attending: EMERGENCY MEDICINE | Admitting: EMERGENCY MEDICINE
Payer: COMMERCIAL

## 2021-02-21 VITALS
HEART RATE: 87 BPM | RESPIRATION RATE: 18 BRPM | OXYGEN SATURATION: 99 % | SYSTOLIC BLOOD PRESSURE: 150 MMHG | TEMPERATURE: 98 F | DIASTOLIC BLOOD PRESSURE: 86 MMHG

## 2021-02-21 VITALS
TEMPERATURE: 98 F | RESPIRATION RATE: 18 BRPM | WEIGHT: 179.9 LBS | HEART RATE: 92 BPM | DIASTOLIC BLOOD PRESSURE: 91 MMHG | SYSTOLIC BLOOD PRESSURE: 144 MMHG | OXYGEN SATURATION: 97 % | HEIGHT: 67 IN

## 2021-02-21 DIAGNOSIS — I25.10 ATHEROSCLEROTIC HEART DISEASE OF NATIVE CORONARY ARTERY WITHOUT ANGINA PECTORIS: Chronic | ICD-10-CM

## 2021-02-21 DIAGNOSIS — Z95.2 PRESENCE OF PROSTHETIC HEART VALVE: Chronic | ICD-10-CM

## 2021-02-21 LAB
ALBUMIN SERPL ELPH-MCNC: 3.2 G/DL — LOW (ref 3.3–5)
ALP SERPL-CCNC: 145 U/L — HIGH (ref 40–120)
ALT FLD-CCNC: 22 U/L — SIGNIFICANT CHANGE UP (ref 12–78)
ANION GAP SERPL CALC-SCNC: 8 MMOL/L — SIGNIFICANT CHANGE UP (ref 5–17)
APPEARANCE UR: CLEAR — SIGNIFICANT CHANGE UP
AST SERPL-CCNC: 16 U/L — SIGNIFICANT CHANGE UP (ref 15–37)
BASOPHILS # BLD AUTO: 0.04 K/UL — SIGNIFICANT CHANGE UP (ref 0–0.2)
BASOPHILS NFR BLD AUTO: 0.9 % — SIGNIFICANT CHANGE UP (ref 0–2)
BILIRUB SERPL-MCNC: 0.8 MG/DL — SIGNIFICANT CHANGE UP (ref 0.2–1.2)
BILIRUB UR-MCNC: NEGATIVE — SIGNIFICANT CHANGE UP
BUN SERPL-MCNC: 28 MG/DL — HIGH (ref 7–23)
CALCIUM SERPL-MCNC: 8.6 MG/DL — SIGNIFICANT CHANGE UP (ref 8.5–10.1)
CHLORIDE SERPL-SCNC: 108 MMOL/L — SIGNIFICANT CHANGE UP (ref 96–108)
CO2 SERPL-SCNC: 24 MMOL/L — SIGNIFICANT CHANGE UP (ref 22–31)
COLOR SPEC: SIGNIFICANT CHANGE UP
CREAT SERPL-MCNC: 1.3 MG/DL — SIGNIFICANT CHANGE UP (ref 0.5–1.3)
DIFF PNL FLD: NEGATIVE — SIGNIFICANT CHANGE UP
EOSINOPHIL # BLD AUTO: 0.04 K/UL — SIGNIFICANT CHANGE UP (ref 0–0.5)
EOSINOPHIL NFR BLD AUTO: 0.9 % — SIGNIFICANT CHANGE UP (ref 0–6)
GLUCOSE SERPL-MCNC: 262 MG/DL — HIGH (ref 70–99)
GLUCOSE UR QL: NEGATIVE — SIGNIFICANT CHANGE UP
HCT VFR BLD CALC: 34 % — LOW (ref 39–50)
HGB BLD-MCNC: 10.8 G/DL — LOW (ref 13–17)
IMM GRANULOCYTES NFR BLD AUTO: 0.7 % — SIGNIFICANT CHANGE UP (ref 0–1.5)
KETONES UR-MCNC: NEGATIVE — SIGNIFICANT CHANGE UP
LEUKOCYTE ESTERASE UR-ACNC: NEGATIVE — SIGNIFICANT CHANGE UP
LYMPHOCYTES # BLD AUTO: 1.12 K/UL — SIGNIFICANT CHANGE UP (ref 1–3.3)
LYMPHOCYTES # BLD AUTO: 24.4 % — SIGNIFICANT CHANGE UP (ref 13–44)
MCHC RBC-ENTMCNC: 29.2 PG — SIGNIFICANT CHANGE UP (ref 27–34)
MCHC RBC-ENTMCNC: 31.8 GM/DL — LOW (ref 32–36)
MCV RBC AUTO: 91.9 FL — SIGNIFICANT CHANGE UP (ref 80–100)
MONOCYTES # BLD AUTO: 0.4 K/UL — SIGNIFICANT CHANGE UP (ref 0–0.9)
MONOCYTES NFR BLD AUTO: 8.7 % — SIGNIFICANT CHANGE UP (ref 2–14)
NEUTROPHILS # BLD AUTO: 2.96 K/UL — SIGNIFICANT CHANGE UP (ref 1.8–7.4)
NEUTROPHILS NFR BLD AUTO: 64.4 % — SIGNIFICANT CHANGE UP (ref 43–77)
NITRITE UR-MCNC: NEGATIVE — SIGNIFICANT CHANGE UP
NRBC # BLD: 0 /100 WBCS — SIGNIFICANT CHANGE UP (ref 0–0)
NT-PROBNP SERPL-SCNC: 3016 PG/ML — HIGH (ref 0–450)
PH UR: 5 — SIGNIFICANT CHANGE UP (ref 5–8)
PLATELET # BLD AUTO: 183 K/UL — SIGNIFICANT CHANGE UP (ref 150–400)
POTASSIUM SERPL-MCNC: 4.4 MMOL/L — SIGNIFICANT CHANGE UP (ref 3.5–5.3)
POTASSIUM SERPL-SCNC: 4.4 MMOL/L — SIGNIFICANT CHANGE UP (ref 3.5–5.3)
PROT SERPL-MCNC: 6.9 G/DL — SIGNIFICANT CHANGE UP (ref 6–8.3)
PROT UR-MCNC: NEGATIVE — SIGNIFICANT CHANGE UP
RBC # BLD: 3.7 M/UL — LOW (ref 4.2–5.8)
RBC # FLD: 15 % — HIGH (ref 10.3–14.5)
SARS-COV-2 RNA SPEC QL NAA+PROBE: SIGNIFICANT CHANGE UP
SODIUM SERPL-SCNC: 140 MMOL/L — SIGNIFICANT CHANGE UP (ref 135–145)
SP GR SPEC: 1.01 — SIGNIFICANT CHANGE UP (ref 1.01–1.02)
UROBILINOGEN FLD QL: NEGATIVE — SIGNIFICANT CHANGE UP
WBC # BLD: 4.59 K/UL — SIGNIFICANT CHANGE UP (ref 3.8–10.5)
WBC # FLD AUTO: 4.59 K/UL — SIGNIFICANT CHANGE UP (ref 3.8–10.5)

## 2021-02-21 PROCEDURE — 76870 US EXAM SCROTUM: CPT

## 2021-02-21 PROCEDURE — U0005: CPT

## 2021-02-21 PROCEDURE — 80053 COMPREHEN METABOLIC PANEL: CPT

## 2021-02-21 PROCEDURE — 93005 ELECTROCARDIOGRAM TRACING: CPT

## 2021-02-21 PROCEDURE — 87086 URINE CULTURE/COLONY COUNT: CPT

## 2021-02-21 PROCEDURE — 83880 ASSAY OF NATRIURETIC PEPTIDE: CPT

## 2021-02-21 PROCEDURE — 81003 URINALYSIS AUTO W/O SCOPE: CPT

## 2021-02-21 PROCEDURE — 87635 SARS-COV-2 COVID-19 AMP PRB: CPT

## 2021-02-21 PROCEDURE — 96374 THER/PROPH/DIAG INJ IV PUSH: CPT

## 2021-02-21 PROCEDURE — 76870 US EXAM SCROTUM: CPT | Mod: 26

## 2021-02-21 PROCEDURE — 99284 EMERGENCY DEPT VISIT MOD MDM: CPT | Mod: 25

## 2021-02-21 PROCEDURE — 85025 COMPLETE CBC W/AUTO DIFF WBC: CPT

## 2021-02-21 PROCEDURE — 93970 EXTREMITY STUDY: CPT | Mod: 26

## 2021-02-21 PROCEDURE — 99285 EMERGENCY DEPT VISIT HI MDM: CPT

## 2021-02-21 PROCEDURE — 36415 COLL VENOUS BLD VENIPUNCTURE: CPT

## 2021-02-21 PROCEDURE — 93010 ELECTROCARDIOGRAM REPORT: CPT

## 2021-02-21 PROCEDURE — 71045 X-RAY EXAM CHEST 1 VIEW: CPT | Mod: 26

## 2021-02-21 PROCEDURE — 71045 X-RAY EXAM CHEST 1 VIEW: CPT

## 2021-02-21 PROCEDURE — 93970 EXTREMITY STUDY: CPT

## 2021-02-21 RX ORDER — FUROSEMIDE 40 MG
1 TABLET ORAL
Qty: 30 | Refills: 0
Start: 2021-02-21 | End: 2021-03-22

## 2021-02-21 RX ORDER — FUROSEMIDE 40 MG
40 TABLET ORAL ONCE
Refills: 0 | Status: COMPLETED | OUTPATIENT
Start: 2021-02-21 | End: 2021-02-21

## 2021-02-21 RX ADMIN — Medication 40 MILLIGRAM(S): at 16:14

## 2021-02-21 NOTE — ED PROVIDER NOTE - CHPI ED SYMPTOMS NEG
no back pain/no chest pain/no cough/no dizziness/no fever/no nausea/no syncope/no vomiting/no chills/no diaphoresis

## 2021-02-21 NOTE — ED PROVIDER NOTE - CARE PROVIDERS DIRECT ADDRESSES
[email or web address]

## 2021-02-21 NOTE — CONSULT NOTE ADULT - ATTENDING COMMENTS
The patient was personally seen and examined, in addition to being examined and evaluated by NP.  All elements of the note were edited where appropriate.    edema, without left heart failure  mod-sev TR  progressive edema likely on the basis of loss of av synchrony  has appt with ep to discuss restoration of sr  for now would fluid restrict and give lasix 80 with zarox 2.5  expedited office followup

## 2021-02-21 NOTE — ED PROVIDER NOTE - OBJECTIVE STATEMENT
86 y male presents with bilateral le edema, scrotal edema,  states the le edema is a chronic condition, he is taking "water pill", the scrotal edema he noticed 1 week ago.  states he is able to urinate, has no pain with urination, denies chest pain,  states he does have jamil.  denies nvd, constipation, fever, cough, abdominal pain.  states his PMD is Dr Rush, Cardio Dr Miranda,  PMH:  NIDDM, htn  former smoker, no etoh.

## 2021-02-21 NOTE — ED PROVIDER NOTE - CLINICAL SUMMARY MEDICAL DECISION MAKING FREE TEXT BOX
bilateral le edema, scrotal edema, "burried penis",  will obtain labs, imaging, doppler, us, ekg shows rate controlled afib, cardio consult

## 2021-02-21 NOTE — ED PROVIDER NOTE - PROGRESS NOTE DETAILS
patient seen and evaluated by Dr Garcia, advised patient take lasix 80 mg daily send rx metalazone 2.5 mg 30 minutes before lasix,  follow up with Dr Miranda Wednesday in the office.    discussed plan of care with patient, advised patient follow up with urologist as well, given information for Dr Vogt  Reevaluated patient at bedside.  Patient feeling much improved.  Discussed the results of all diagnostic testing in ED and copies of all reports given.   An opportunity to ask questions was given.  Discussed the importance of prompt, close medical follow-up.  Patient will return with any changes, concerns or persistent / worsening symptoms.  Understanding of all instructions verbalized.

## 2021-02-21 NOTE — ED ADULT NURSE NOTE - OBJECTIVE STATEMENT
Patient came to ED d/t worsening swelling to B/L LE and scrotum. Patient states he always has swelling and takes a diuretic daily. Patient denies pain. +2  edema noted to B/L LE

## 2021-02-21 NOTE — ED PROVIDER NOTE - CARE PROVIDER_API CALL
Ayad Miranda (MD)  Cardiovascular Disease; Internal Medicine  [address] [place] [address]  Phone: [phone]  Fax: [phone]  Follow Up Time: 1-3 Days    Haroon Vogt  UROLOGY  [address] [place] [address], [address]  [place] [address]  Phone: [phone]  Fax: [phone]  Follow Up Time: 4-6 Days

## 2021-02-21 NOTE — CONSULT NOTE ADULT - SUBJECTIVE AND OBJECTIVE BOX
[facility] - Ruben Matthews, Betsy, Jose, Sonny, Ijeoma Uribe  Office Number: [phone]    Initial Consult Note    CHIEF COMPLAINT: Patient is a 86y old  Male who presents with a chief complaint of scrotal edema.     HPI:  86 year old male with past medical history coronary artery disease (status post PCI to LAD 8/2005), bioprosthetic aortic valve replacement (2003), mild mitral regurgitation, moderate tricuspid regurgitation, diabetes, a dyslipidemia, hypertension, peripheral vascular disease, and moderate carotid atherosclerosis, , new afib diagnosed 11/2020 started on toprol and eliquis, recently seen in office for LE edema and fatigue by Dr Miranda , lasix increased on 1/20/21 to 80mg daily with minimal relief, now presenting with progressively worsening Le and scrotal edema.      PAST MEDICAL & SURGICAL HISTORY:  Diabetes mellitus    CAD (coronary artery disease)  cardiac stent    History of aortic valve replacement    SOCIAL HISTORY:  No tobacco, ethanol, or drug abuse.    FAMILY HISTORY:    No family history of acute MI or sudden cardiac death.    MEDICATIONS  (STANDING):    MEDICATIONS  (PRN):      Allergies    No Known Allergies    Intolerances        REVIEW OF SYSTEMS:    CONSTITUTIONAL: No weakness, fevers or chills  EYES/ENT: No visual changes;  No vertigo or throat pain   NECK: No pain or stiffness  RESPIRATORY: No cough, wheezing, hemoptysis; No shortness of breath  CARDIOVASCULAR: No chest pain or palpitations  GASTROINTESTINAL: No abdominal pain. No nausea, vomiting, or hematemesis; No diarrhea or constipation. No melena or hematochezia.  GENITOURINARY: No dysuria, frequency or hematuria  NEUROLOGICAL: No numbness or weakness  SKIN: No itching or rash  All other review of systems is negative unless indicated above    VITAL SIGNS:   Vital Signs Last 24 Hrs  T(C): 36.4 (21 Feb 2021 13:09), Max: 36.4 (21 Feb 2021 13:09)  T(F): 97.6 (21 Feb 2021 13:09), Max: 97.6 (21 Feb 2021 13:09)  HR: 92 (21 Feb 2021 13:09) (92 - 92)  BP: 144/91 (21 Feb 2021 13:09) (144/91 - 144/91)  BP(mean): --  RR: 18 (21 Feb 2021 13:09) (18 - 18)  SpO2: 97% (21 Feb 2021 13:09) (97% - 97%)    I&O's Summary      On Exam:  Constitutional: NAD, alert and oriented x 3  Lungs:  Non-labored, breath sounds are clear bilaterally, No wheezing, rales or rhonchi  Cardiovascular: RRR.  S1 and S2 positive.  No murmurs, rubs, gallops or clicks  Gastrointestinal: Bowel Sounds present, soft, nontender.   Lymph: + edema   Neurological: Alert, no focal deficits  Skin: No rashes or ulcers   Psych:  Mood & affect appropriate.    LABS: All Labs Reviewed:                        10.8   4.59  )-----------( 183      ( 21 Feb 2021 13:48 )             34.0     21 Feb 2021 14:10    140    |  108    |  28     ----------------------------<  262    4.4     |  24     |  1.30     Ca    8.6        21 Feb 2021 14:10    TPro  6.9    /  Alb  3.2    /  TBili  0.8    /  DBili  x      /  AST  16     /  ALT  22     /  AlkPhos  145    21 Feb 2021 14:10                   [facility] - Ruben Matthews, Betsy, Jose, Sonny, Ijeoma Uribe  Office Number: [phone]    Initial Consult Note    CHIEF COMPLAINT: Patient is a 86y old  Male who presents with a chief complaint of scrotal edema.     HPI:  86 year old male with past medical history coronary artery disease (status post PCI to LAD 8/2005), bioprosthetic aortic valve replacement (2003), mild mitral regurgitation, moderate tricuspid regurgitation, diabetes, a dyslipidemia, hypertension, peripheral vascular disease, and moderate carotid atherosclerosis, , new afib diagnosed 11/2020 started on toprol and eliquis, recently seen in office for LE edema and fatigue by Dr Miranda , lasix increased on 1/20/21 to 80mg daily with minimal relief, now presenting with progressively worsening Le and scrotal edema.  Seen at bedside, reports progressively worsening LE edema x 2 months now with scrotal edema x 10 days. States he has been taking his lasix as directed with no missed doses.   Denies any exertional shortness of breath or chest pain. No dizziness, palpitations, fever chills, n/v/d/ fever or chills.   denies medication noncompliance, admits to drinking 6 cups water a day plus soda and ice tea throughout day. Has not weighed himself.    PAST MEDICAL & SURGICAL HISTORY:  Diabetes mellitus    CAD (coronary artery disease)  cardiac stent    History of aortic valve replacement    SOCIAL HISTORY:  No tobacco, ethanol, or drug abuse.    FAMILY HISTORY:    No family history of acute MI or sudden cardiac death.    MEDICATIONS  (STANDING):    MEDICATIONS  (PRN):      Allergies    No Known Allergies    Intolerances        REVIEW OF SYSTEMS:    CONSTITUTIONAL: +weakness, fevers or chills  EYES/ENT: No visual changes;  No vertigo or throat pain   NECK: No pain or stiffness  RESPIRATORY: No cough, wheezing, hemoptysis; No shortness of breath  CARDIOVASCULAR: No chest pain or palpitations  GASTROINTESTINAL: No abdominal pain. No nausea, vomiting, or hematemesis; No diarrhea or constipation. No melena or hematochezia.  GENITOURINARY: No dysuria, frequency or hematuria  NEUROLOGICAL: No numbness or weakness  SKIN: No itching or rash  All other review of systems is negative unless indicated above    VITAL SIGNS:   Vital Signs Last 24 Hrs  T(C): 36.4 (21 Feb 2021 13:09), Max: 36.4 (21 Feb 2021 13:09)  T(F): 97.6 (21 Feb 2021 13:09), Max: 97.6 (21 Feb 2021 13:09)  HR: 92 (21 Feb 2021 13:09) (92 - 92)  BP: 144/91 (21 Feb 2021 13:09) (144/91 - 144/91)  BP(mean): --  RR: 18 (21 Feb 2021 13:09) (18 - 18)  SpO2: 97% (21 Feb 2021 13:09) (97% - 97%)    I&O's Summary      On Exam:  Constitutional: NAD, alert and oriented x 3  Lungs:  Non-labored, breath sounds are clear bilaterally, No wheezing, rales or rhonchi  Cardiovascular: IRRR.  S1 and S2 positive.  No murmurs, rubs, gallops or clicks  Gastrointestinal: Bowel Sounds present, soft, nontender.   Lymph: + edema bilateral LE   Neurological: Alert, no focal deficits  Skin: No rashes or ulcers   Psych:  Mood & affect appropriate.    LABS: All Labs Reviewed:                        10.8   4.59  )-----------( 183      ( 21 Feb 2021 13:48 )             34.0     21 Feb 2021 14:10    140    |  108    |  28     ----------------------------<  262    4.4     |  24     |  1.30     Ca    8.6        21 Feb 2021 14:10    TPro  6.9    /  Alb  3.2    /  TBili  0.8    /  DBili  x      /  AST  16     /  ALT  22     /  AlkPhos  145    21 Feb 2021 14:10                   [facility] - Ruben Matthews, Betsy, Jose, Sonny, Ijeoma Uribe  Office Number: [phone]    Initial Consult Note    CHIEF COMPLAINT: Patient is a 86y old  Male who presents with a chief complaint of scrotal edema.     HPI:  86 year old male with past medical history coronary artery disease (status post PCI to LAD 8/2005), bioprosthetic aortic valve replacement (2003), mild mitral regurgitation, moderate tricuspid regurgitation, diabetes, a dyslipidemia, hypertension, peripheral vascular disease, and moderate carotid atherosclerosis, , new afib diagnosed 11/2020 started on toprol and eliquis, recently seen in office for LE edema and fatigue by Dr Miranda.  His edema has been felt related to the onset of the Af, and he has been on escalating doses of lasix first 20, then 40, then 80. Lasix increased on 1/20/21 to 80mg daily with minimal relief, though it is not clear if he is taking 40 or 80.  He is now presenting with progressively worsening Le and scrotal edema.  Seen at bedside, reports progressively worsening LE edema x 2 months now with scrotal edema x 10 days. States he has been taking his lasix as directed with no missed doses.   Denies any exertional shortness of breath or chest pain. No dizziness, palpitations, fever chills, n/v/d/ fever or chills.   denies medication noncompliance, admits to drinking 6 cups water a day plus soda and ice tea throughout day. Has not weighed himself.    PAST MEDICAL & SURGICAL HISTORY:  Diabetes mellitus    CAD (coronary artery disease)  cardiac stent    History of aortic valve replacement    SOCIAL HISTORY:  No tobacco, ethanol, or drug abuse.    FAMILY HISTORY:    No family history of acute MI or sudden cardiac death.    MEDICATIONS  (STANDING):    MEDICATIONS  (PRN):      Allergies    No Known Allergies    Intolerances        REVIEW OF SYSTEMS:    CONSTITUTIONAL: +weakness, fevers or chills  EYES/ENT: No visual changes;  No vertigo or throat pain   NECK: No pain or stiffness  RESPIRATORY: No cough, wheezing, hemoptysis; No shortness of breath  CARDIOVASCULAR: No chest pain or palpitations  GASTROINTESTINAL: No abdominal pain. No nausea, vomiting, or hematemesis; No diarrhea or constipation. No melena or hematochezia.  GENITOURINARY: No dysuria, frequency or hematuria  NEUROLOGICAL: No numbness or weakness  SKIN: No itching or rash  All other review of systems is negative unless indicated above    VITAL SIGNS:   Vital Signs Last 24 Hrs  T(C): 36.4 (21 Feb 2021 13:09), Max: 36.4 (21 Feb 2021 13:09)  T(F): 97.6 (21 Feb 2021 13:09), Max: 97.6 (21 Feb 2021 13:09)  HR: 92 (21 Feb 2021 13:09) (92 - 92)  BP: 144/91 (21 Feb 2021 13:09) (144/91 - 144/91)  BP(mean): --  RR: 18 (21 Feb 2021 13:09) (18 - 18)  SpO2: 97% (21 Feb 2021 13:09) (97% - 97%)    I&O's Summary      On Exam:  Constitutional: NAD, alert and oriented x 3  Lungs:  Non-labored, breath sounds are clear bilaterally, No wheezing, rales or rhonchi  Cardiovascular: IRRR.  S1 and S2 positive.  No murmurs, rubs, gallops or clicks  Gastrointestinal: Bowel Sounds present, soft, nontender.   Lymph: + edema bilateral LE   Neurological: Alert, no focal deficits  Skin: No rashes or ulcers   Psych:  Mood & affect appropriate.    LABS: All Labs Reviewed:                        10.8   4.59  )-----------( 183      ( 21 Feb 2021 13:48 )             34.0     21 Feb 2021 14:10    140    |  108    |  28     ----------------------------<  262    4.4     |  24     |  1.30     Ca    8.6        21 Feb 2021 14:10    TPro  6.9    /  Alb  3.2    /  TBili  0.8    /  DBili  x      /  AST  16     /  ALT  22     /  AlkPhos  145    21 Feb 2021 14:10      ekg af

## 2021-02-21 NOTE — ED PROVIDER NOTE - PATIENT PORTAL LINK FT
You can access the FollowMyHealth Patient Portal offered by [facility] by registering at the following website: [email or web address]. By joining [employer]’s FollowMyHealth portal, you will also be able to view your health information using other applications (apps) compatible with our system.

## 2021-02-21 NOTE — ED PROVIDER NOTE - ATTENDING CONTRIBUTION TO CARE
85 yo M p/w increased LE edema, ? some increase in [place] G[place]. Pt also now with worsening scrotal edema. No cp. no abd pain. no v/d. No recent trauma. no recent illness. no cough/uri. No known covid exposure / prior infxn. NO recent trauma. No recent illness. no agg/allev factors. no other inj or co.  exam: MM Moist. neck supple. nl resp effort. no acc muscle use. no w/r/r. abd soft NT. Pos 2 - 3 + bl le edema. Pos scrotal edema without tenderness / erythema / crepitus. Nl non-fopcal neuro exam. No other acute findings.  check labs, US, cardio, possible admission

## 2021-02-21 NOTE — ED ADULT TRIAGE NOTE - CHIEF COMPLAINT QUOTE
pt states his left testicle began swelling about a week ago and now right one is swollen as well  states he always has intermittent swelling of legs and he was told that his heart "beats heavily"  pt denies history of afib but has irregular heartbeat in triage

## 2021-02-21 NOTE — CONSULT NOTE ADULT - ASSESSMENT
86 year old male with past medical history coronary artery disease (status post PCI to LAD 8/2005), bioprosthetic aortic valve replacement (2003), mild mitral regurgitation, moderate tricuspid regurgitation, diabetes, a dyslipidemia, hypertension, peripheral vascular disease, and moderate carotid atherosclerosis, , new a-fib diagnosed 11/2020 started on toprol and eliquis, recently seen in office for LE edema and fatigue by Dr Miranda , lasix increased on 1/20/21 to 80mg daily with minimal relief, now presenting with progressively worsening Le and scrotal edema.        NST 12/2020 normal perfusion, with ef 59%  Carotid artery Doppler testing performed on 12/18/20 revealed mild to moderate plaque formation involving the common carotid arteries,  Echocardiography 12/18/20 revealed mildly dilated right atrium, normal LV systolic, ef 55-60%, bio aortic valve, moderate to severe TR, right pleural effusion.   BNP 3016  Covid negative    86 year old male with past medical history coronary artery disease (status post PCI to LAD 8/2005), bioprosthetic aortic valve replacement (2003), mild mitral regurgitation, moderate tricuspid regurgitation, diabetes, a dyslipidemia, hypertension, peripheral vascular disease, and moderate carotid atherosclerosis, , new a-fib diagnosed 11/2020 started on toprol and eliquis, recently seen in office for LE edema and fatigue by Dr Miranda , lasix increased on 1/20/21 to 80mg daily with minimal relief, now presenting with progressively worsening Le and scrotal edema.      NST 12/2020 normal perfusion, with ef 59%  Carotid artery Doppler testing performed on 12/18/20 revealed mild to moderate plaque formation involving the common carotid arteries,  Echocardiography 12/18/20 revealed mildly dilated right atrium, normal LV systolic, ef 55-60%, bio aortic valve, moderate to severe TR, right pleural effusion.   BNP 3016  Hypervolemic on exam with bilateral LE edema, no orthopnea laying flat on RA-    give 40mg IV lasix now, on discharge to take 80mg Lasix po daily with Zaroxolyn 2.5 mg Po daily  to be seen in office on 2/24/2021 with blood work prior to visit  emphasized importance of low sodium diet with fluid restriction, daily weights  LE doppler no DVT  Testicular doppler Fu official results  continue with toprol and eliquis for afib- has appointment on 3/1/2021 with EPS    Torri Flores FNP-C  Cardiology NP  [facility]9 [phone]

## 2021-02-21 NOTE — ED PROVIDER NOTE - NSFOLLOWUPINSTRUCTIONS_ED_ALL_ED_FT
follow up with Dr Miranda on Wednesday in the office  call tomorrow for appt time  follow up with urologist Dr Vogt  call tomorrow to arrange follow up  as per Dr Garcia cardiologist, take Lasix 80 mg daily   start metalazone 2.5 mg to be taken 30 minutes before lasix daily  if your condition worsens return to ED

## 2021-02-21 NOTE — ED PROVIDER NOTE - PROVIDER TOKENS
PROVIDER:[TOKEN:[[id number]],FOLLOWUP:[1-3 Days]],PROVIDER:[TOKEN:[[id number]],FOLLOWUP:[4-6 Days]]

## 2021-02-22 LAB
CULTURE RESULTS: SIGNIFICANT CHANGE UP
SPECIMEN SOURCE: SIGNIFICANT CHANGE UP

## 2021-03-01 ENCOUNTER — APPOINTMENT (OUTPATIENT)
Dept: CARDIOLOGY | Facility: CLINIC | Age: 86
End: 2021-03-01

## 2021-03-01 ENCOUNTER — NON-APPOINTMENT (OUTPATIENT)
Age: 86
End: 2021-03-01

## 2021-03-01 ENCOUNTER — APPOINTMENT (OUTPATIENT)
Dept: ELECTROPHYSIOLOGY | Facility: CLINIC | Age: 86
End: 2021-03-01
Payer: MEDICARE

## 2021-03-01 VITALS — HEART RATE: 81 BPM | SYSTOLIC BLOOD PRESSURE: 152 MMHG | DIASTOLIC BLOOD PRESSURE: 67 MMHG

## 2021-03-01 PROCEDURE — 99205 OFFICE O/P NEW HI 60 MIN: CPT

## 2021-03-01 PROCEDURE — 99072 ADDL SUPL MATRL&STAF TM PHE: CPT

## 2021-03-01 PROCEDURE — 93000 ELECTROCARDIOGRAM COMPLETE: CPT

## 2021-03-02 NOTE — DISCUSSION/SUMMARY
[FreeTextEntry1] : In summary, this is an 86 year old man with persistent atrial fibrillation and progressive symptoms of exertional intolerance and volume overload over recent month. While it is uncertain for how long he has been in AF (last sinus ECG was in 2016 until next study in 11/20) his clinical syndrome would suggest onset some time in the fall of last year. Options regarding management, including a rate control strategy with AV stephania blocking agents, or rhythm control strategies employing anti-arrhythmic drugs and/or catheter ablation were reviewed. Given his age, comorbidites, and persistence of symptoms despite rate control, I recommended that Mr. Couch begin amiodarone load and return for cardioversion in 2-3 weeks. Risks and merits of amiodarone therapy were discussed at length, and Mr. Gonzalez was open to this approach.\par \par Mr. Gonzalez appeared to understand the whole discussion and verbalized that all of his questions were answered to his satisfaction.\par \par Thank you for allowing me to be involved in the care of this pleasant man. Please feel free to contact me with any questions.\par \par \par \par Demetrius Walker MD\par  of Cardiology\par Electrophysiology Section\par [address] [place] [address], [address] [place] [address]\[place] [address]\par Office: [phone]\par Fax: [phone]\par

## 2021-03-02 NOTE — PHYSICAL EXAM
[General Appearance - Well Developed] : well developed [Normal Appearance] : normal appearance [Well Groomed] : well groomed [General Appearance - Well Nourished] : well nourished [No Deformities] : no deformities [General Appearance - In No Acute Distress] : no acute distress [Normal Conjunctiva] : the conjunctiva exhibited no abnormalities [Eyelids - No Xanthelasma] : the eyelids demonstrated no xanthelasmas [Normal Oral Mucosa] : normal oral mucosa [No Oral Pallor] : no oral pallor [No Oral Cyanosis] : no oral cyanosis [Normal Jugular Venous A Waves Present] : normal jugular venous A waves present [Normal Jugular Venous V Waves Present] : normal jugular venous V waves present [No Jugular Venous Abel A Waves] : no jugular venous abel A waves [Heart Sounds] : normal S1 and S2 [Murmurs] : no murmurs present [Arterial Pulses Normal] : the arterial pulses were normal [Edema] : no peripheral edema present [Respiration, Rhythm And Depth] : normal respiratory rhythm and effort [Exaggerated Use Of Accessory Muscles For Inspiration] : no accessory muscle use [Auscultation Breath Sounds / Voice Sounds] : lungs were clear to auscultation bilaterally [Abdomen Soft] : soft [Abdomen Tenderness] : non-tender [Abdomen Mass (___ Cm)] : no abdominal mass palpated [Abnormal Walk] : normal gait [Gait - Sufficient For Exercise Testing] : the gait was sufficient for exercise testing [Nail Clubbing] : no clubbing of the fingernails [Cyanosis, Localized] : no localized cyanosis [Petechial Hemorrhages (___cm)] : no petechial hemorrhages [Skin Color & Pigmentation] : normal skin color and pigmentation [] : no rash [No Venous Stasis] : no venous stasis [Skin Lesions] : no skin lesions [No Skin Ulcers] : no skin ulcer [No Xanthoma] : no  xanthoma was observed [Oriented To Time, Place, And Person] : oriented to person, place, and time [Affect] : the affect was normal [Mood] : the mood was normal [No Anxiety] : not feeling anxious [FreeTextEntry1] : Irregular rhythm

## 2021-03-02 NOTE — HISTORY OF PRESENT ILLNESS
[FreeTextEntry1] : Referring Physician: Dr. Ayad Miranda, \par \par Dear Dr. Miranda:\par \par Mr. Gonzalez was seen in the [facility] today. For our records, please allow me to summarize the history and my findings.\par \par This pleasant 86 year old man has a cardiovascular history significant for coronary artery disease (status post PCI to LAD 8/2005), bioprosthetic aortic valve replacement (2003), mild mitral regurgitation, moderate tricuspid regurgitation, diabetes, a dyslipidemia, hypertension, peripheral vascular disease, and moderate carotid atherosclerosis and atrial fibrillation (diagnosed 11/2020). He was subsequently started on OAC and beta blockers.  He presents today for further arrhythmia management.\par \par Mr. Gonzalez has noted progressive fatigue and worsened exercise tolerance since the fall of 2020, associated with progressive LE edema. This has persisted despite improving rate control. He has no chest pain, dizziness or syncope.\par \par A Holter monitor study performed from 11/23/20 - 11/24/20 revealed atrial fibrillation throughout the recording with an average ventricular rate of 91 beats per minute (range  beats per minute, when measured on a [id number] basis). No significant pauses were exhibited. Occasional ventricular premature contractions were exhibited, including rare couplets. 22 episodes of non-sustained ventricular tachycardia were exhibited, the longest of which was 7 beats in duration. Note that the patient had not yet initiated beta-blocker therapy when the study was performed.\par \par Pharmacological nuclear stress testing performed on 12/11/20 was negative for the inducement of cardiac symptoms or electrocardiographic evidence of myocardial ischemia. Atrial fibrillation was present throughout the study. The cardiac imaging portion of the study revealed normal left ventricular myocardial perfusion and systolic function, with a calculated ejection fraction of 59%.\par \par Echocardiography performed on 12/18/20 revealed the left atrium to be moderately dilated and the right atrium to be mildly dilated. The ventricles were normal in dimension. Mild concentric left ventricular hypertrophy was identified. Left ventricular wall motion and systolic function were normal, with an estimated ejection fraction of 55-60%.

## 2021-06-24 PROBLEM — E11.9 TYPE 2 DIABETES MELLITUS WITHOUT COMPLICATIONS: Chronic | Status: ACTIVE | Noted: 2021-02-21

## 2021-07-07 ENCOUNTER — NON-APPOINTMENT (OUTPATIENT)
Age: 86
End: 2021-07-07

## 2021-07-09 ENCOUNTER — OUTPATIENT (OUTPATIENT)
Dept: OUTPATIENT SERVICES | Facility: HOSPITAL | Age: 86
LOS: 1 days | End: 2021-07-09
Payer: MEDICARE

## 2021-07-09 ENCOUNTER — APPOINTMENT (OUTPATIENT)
Dept: CV DIAGNOSITCS | Facility: HOSPITAL | Age: 86
End: 2021-07-09

## 2021-07-09 VITALS — WEIGHT: 184.97 LBS | HEIGHT: 67 IN

## 2021-07-09 DIAGNOSIS — I25.10 ATHEROSCLEROTIC HEART DISEASE OF NATIVE CORONARY ARTERY WITHOUT ANGINA PECTORIS: Chronic | ICD-10-CM

## 2021-07-09 DIAGNOSIS — Z95.2 PRESENCE OF PROSTHETIC HEART VALVE: Chronic | ICD-10-CM

## 2021-07-09 DIAGNOSIS — I48.91 UNSPECIFIED ATRIAL FIBRILLATION: ICD-10-CM

## 2021-07-09 LAB
ANION GAP SERPL CALC-SCNC: 13 MMOL/L — SIGNIFICANT CHANGE UP (ref 5–17)
APTT BLD: 36.3 SEC — HIGH (ref 27.5–35.5)
BUN SERPL-MCNC: 59 MG/DL — HIGH (ref 7–23)
CALCIUM SERPL-MCNC: 9.1 MG/DL — SIGNIFICANT CHANGE UP (ref 8.4–10.5)
CHLORIDE SERPL-SCNC: 99 MMOL/L — SIGNIFICANT CHANGE UP (ref 96–108)
CO2 SERPL-SCNC: 28 MMOL/L — SIGNIFICANT CHANGE UP (ref 22–31)
CREAT SERPL-MCNC: 2.29 MG/DL — HIGH (ref 0.5–1.3)
GLUCOSE SERPL-MCNC: 177 MG/DL — HIGH (ref 70–99)
HCT VFR BLD CALC: 28.9 % — LOW (ref 39–50)
HGB BLD-MCNC: 9.5 G/DL — LOW (ref 13–17)
INR BLD: 1.95 RATIO — HIGH (ref 0.88–1.16)
MCHC RBC-ENTMCNC: 30.8 PG — SIGNIFICANT CHANGE UP (ref 27–34)
MCHC RBC-ENTMCNC: 32.9 GM/DL — SIGNIFICANT CHANGE UP (ref 32–36)
MCV RBC AUTO: 93.8 FL — SIGNIFICANT CHANGE UP (ref 80–100)
NRBC # BLD: 0 /100 WBCS — SIGNIFICANT CHANGE UP (ref 0–0)
PLATELET # BLD AUTO: 185 K/UL — SIGNIFICANT CHANGE UP (ref 150–400)
POTASSIUM SERPL-MCNC: 3.7 MMOL/L — SIGNIFICANT CHANGE UP (ref 3.5–5.3)
POTASSIUM SERPL-SCNC: 3.7 MMOL/L — SIGNIFICANT CHANGE UP (ref 3.5–5.3)
PROTHROM AB SERPL-ACNC: 22.7 SEC — HIGH (ref 10.6–13.6)
RBC # BLD: 3.08 M/UL — LOW (ref 4.2–5.8)
RBC # FLD: 13.2 % — SIGNIFICANT CHANGE UP (ref 10.3–14.5)
SODIUM SERPL-SCNC: 140 MMOL/L — SIGNIFICANT CHANGE UP (ref 135–145)
WBC # BLD: 4.56 K/UL — SIGNIFICANT CHANGE UP (ref 3.8–10.5)
WBC # FLD AUTO: 4.56 K/UL — SIGNIFICANT CHANGE UP (ref 3.8–10.5)

## 2021-07-09 PROCEDURE — 93306 TTE W/DOPPLER COMPLETE: CPT

## 2021-07-09 PROCEDURE — 93312 ECHO TRANSESOPHAGEAL: CPT | Mod: 26

## 2021-07-09 PROCEDURE — 92960 CARDIOVERSION ELECTRIC EXT: CPT

## 2021-07-09 PROCEDURE — 93010 ELECTROCARDIOGRAM REPORT: CPT | Mod: 77

## 2021-07-09 PROCEDURE — 80048 BASIC METABOLIC PNL TOTAL CA: CPT

## 2021-07-09 PROCEDURE — 85610 PROTHROMBIN TIME: CPT

## 2021-07-09 PROCEDURE — 93010 ELECTROCARDIOGRAM REPORT: CPT

## 2021-07-09 PROCEDURE — 93005 ELECTROCARDIOGRAM TRACING: CPT

## 2021-07-09 PROCEDURE — 85730 THROMBOPLASTIN TIME PARTIAL: CPT

## 2021-07-09 PROCEDURE — 93312 ECHO TRANSESOPHAGEAL: CPT

## 2021-07-09 PROCEDURE — 93306 TTE W/DOPPLER COMPLETE: CPT | Mod: 26

## 2021-07-09 PROCEDURE — 85027 COMPLETE CBC AUTOMATED: CPT

## 2021-07-09 RX ORDER — SODIUM CHLORIDE 9 MG/ML
10 INJECTION INTRAMUSCULAR; INTRAVENOUS; SUBCUTANEOUS
Refills: 0 | Status: DISCONTINUED | OUTPATIENT
Start: 2021-07-09 | End: 2021-07-23

## 2021-07-19 ENCOUNTER — NON-APPOINTMENT (OUTPATIENT)
Age: 86
End: 2021-07-19

## 2021-07-20 ENCOUNTER — NON-APPOINTMENT (OUTPATIENT)
Age: 86
End: 2021-07-20

## 2021-07-20 ENCOUNTER — APPOINTMENT (OUTPATIENT)
Dept: CARDIOLOGY | Facility: CLINIC | Age: 86
End: 2021-07-20
Payer: MEDICARE

## 2021-07-20 VITALS
OXYGEN SATURATION: 98 % | HEIGHT: 66.5 IN | DIASTOLIC BLOOD PRESSURE: 73 MMHG | SYSTOLIC BLOOD PRESSURE: 126 MMHG | WEIGHT: 174 LBS | BODY MASS INDEX: 27.64 KG/M2 | HEART RATE: 88 BPM

## 2021-07-20 PROCEDURE — 99072 ADDL SUPL MATRL&STAF TM PHE: CPT

## 2021-07-20 PROCEDURE — 93000 ELECTROCARDIOGRAM COMPLETE: CPT

## 2021-07-20 PROCEDURE — 99215 OFFICE O/P EST HI 40 MIN: CPT

## 2021-07-20 RX ORDER — FUROSEMIDE 80 MG/1
80 TABLET ORAL DAILY
Qty: 90 | Refills: 3 | Status: ACTIVE | COMMUNITY
Start: 2020-11-13 | End: 1900-01-01

## 2021-07-20 RX ORDER — BRIMONIDINE TARTRATE 2 MG/MG
0.2 SOLUTION/ DROPS OPHTHALMIC
Qty: 15 | Refills: 0 | Status: DISCONTINUED | COMMUNITY
Start: 2020-08-25 | End: 2021-07-20

## 2021-07-20 RX ORDER — AMLODIPINE BESYLATE 5 MG/1
5 TABLET ORAL DAILY
Qty: 90 | Refills: 3 | Status: ACTIVE | COMMUNITY
Start: 2020-12-01 | End: 1900-01-01

## 2021-07-20 RX ORDER — METOPROLOL SUCCINATE 50 MG/1
50 TABLET, EXTENDED RELEASE ORAL DAILY
Qty: 90 | Refills: 3 | Status: ACTIVE | COMMUNITY
Start: 2020-11-18 | End: 1900-01-01

## 2021-07-20 RX ORDER — SIMVASTATIN 20 MG/1
20 TABLET, FILM COATED ORAL DAILY
Qty: 90 | Refills: 3 | Status: ACTIVE | COMMUNITY
Start: 2021-01-20 | End: 1900-01-01

## 2021-07-20 RX ORDER — AMIODARONE HYDROCHLORIDE 200 MG/1
200 TABLET ORAL
Qty: 90 | Refills: 3 | Status: ACTIVE | COMMUNITY
Start: 2021-03-01 | End: 1900-01-01

## 2021-07-22 ENCOUNTER — EMERGENCY (EMERGENCY)
Facility: HOSPITAL | Age: 86
LOS: 1 days | Discharge: OTHER INSTITUTION | End: 2021-07-22
Attending: INTERNAL MEDICINE | Admitting: INTERNAL MEDICINE
Payer: MEDICARE

## 2021-07-22 VITALS
SYSTOLIC BLOOD PRESSURE: 118 MMHG | TEMPERATURE: 97 F | HEART RATE: 82 BPM | OXYGEN SATURATION: 95 % | RESPIRATION RATE: 15 BRPM | HEIGHT: 67 IN | WEIGHT: 175.93 LBS | DIASTOLIC BLOOD PRESSURE: 77 MMHG

## 2021-07-22 VITALS
SYSTOLIC BLOOD PRESSURE: 116 MMHG | OXYGEN SATURATION: 96 % | DIASTOLIC BLOOD PRESSURE: 78 MMHG | HEART RATE: 80 BPM | TEMPERATURE: 98 F | RESPIRATION RATE: 16 BRPM

## 2021-07-22 DIAGNOSIS — Z95.2 PRESENCE OF PROSTHETIC HEART VALVE: Chronic | ICD-10-CM

## 2021-07-22 DIAGNOSIS — I25.10 ATHEROSCLEROTIC HEART DISEASE OF NATIVE CORONARY ARTERY WITHOUT ANGINA PECTORIS: Chronic | ICD-10-CM

## 2021-07-22 LAB
APPEARANCE UR: CLEAR — SIGNIFICANT CHANGE UP
BILIRUB UR-MCNC: NEGATIVE — SIGNIFICANT CHANGE UP
COLOR SPEC: YELLOW — SIGNIFICANT CHANGE UP
DIFF PNL FLD: ABNORMAL
GLUCOSE UR QL: NEGATIVE — SIGNIFICANT CHANGE UP
KETONES UR-MCNC: NEGATIVE — SIGNIFICANT CHANGE UP
LEUKOCYTE ESTERASE UR-ACNC: NEGATIVE — SIGNIFICANT CHANGE UP
NITRITE UR-MCNC: NEGATIVE — SIGNIFICANT CHANGE UP
PH UR: 5 — SIGNIFICANT CHANGE UP (ref 5–8)
PROT UR-MCNC: NEGATIVE — SIGNIFICANT CHANGE UP
SP GR SPEC: 1.01 — SIGNIFICANT CHANGE UP (ref 1.01–1.02)
UROBILINOGEN FLD QL: NEGATIVE — SIGNIFICANT CHANGE UP

## 2021-07-22 PROCEDURE — 99283 EMERGENCY DEPT VISIT LOW MDM: CPT

## 2021-07-22 PROCEDURE — 87086 URINE CULTURE/COLONY COUNT: CPT

## 2021-07-22 PROCEDURE — 99284 EMERGENCY DEPT VISIT MOD MDM: CPT

## 2021-07-22 PROCEDURE — 81001 URINALYSIS AUTO W/SCOPE: CPT

## 2021-07-22 RX ORDER — CEFUROXIME AXETIL 250 MG
1 TABLET ORAL
Qty: 20 | Refills: 0
Start: 2021-07-22 | End: 2021-07-31

## 2021-07-22 RX ORDER — TAMSULOSIN HYDROCHLORIDE 0.4 MG/1
1 CAPSULE ORAL
Qty: 30 | Refills: 0
Start: 2021-07-22 | End: 2021-08-20

## 2021-07-22 RX ORDER — TAMSULOSIN HYDROCHLORIDE 0.4 MG/1
0.4 CAPSULE ORAL ONCE
Refills: 0 | Status: COMPLETED | OUTPATIENT
Start: 2021-07-22 | End: 2021-07-22

## 2021-07-22 RX ORDER — CEFUROXIME AXETIL 250 MG
500 TABLET ORAL ONCE
Refills: 0 | Status: COMPLETED | OUTPATIENT
Start: 2021-07-22 | End: 2021-07-22

## 2021-07-22 RX ADMIN — Medication 500 MILLIGRAM(S): at 21:41

## 2021-07-22 RX ADMIN — TAMSULOSIN HYDROCHLORIDE 0.4 MILLIGRAM(S): 0.4 CAPSULE ORAL at 21:41

## 2021-07-22 NOTE — ED PROVIDER NOTE - CLINICAL SUMMARY MEDICAL DECISION MAKING FREE TEXT BOX
acute urinary retention  plan   Vinson cathter  rx Ceftin Flomax and refer to urology, the patient has f/u appointment with Dr Rush on Monday

## 2021-07-22 NOTE — ED PROVIDER NOTE - PATIENT PORTAL LINK FT
You can access the FollowMyHealth Patient Portal offered by [facility] by registering at the following website: [email or web address]. By joining [facility]’s FollowMyHealth portal, you will also be able to view your health information using other applications (apps) compatible with our system.

## 2021-07-22 NOTE — ED ADULT NURSE NOTE - OBJECTIVE STATEMENT
received pt stable alert oriented x4 no distress c/o urinary retention pt evaluated by MD Levin and 16 fr catheter in place by MD pt voided 1000 mls of clear urine and urine sent to lab and pt medicated as ordered and leg bag placed

## 2021-07-22 NOTE — ED ADULT NURSE REASSESSMENT NOTE - NS ED NURSE REASSESS COMMENT FT1
pt reavaluated and vital signs stable and d/c home with lweg bag in place pt verbalizes understanding to follow up

## 2021-07-22 NOTE — ED PROVIDER NOTE - NSFOLLOWUPINSTRUCTIONS_ED_ALL_ED_FT
1. take Ceftin antibiotic for Urinary Tract Infection  2. Take flomax for the enlarged prostate   3. f/u with the urologist     A urinary tract infection (UTI) is an infection of any part of the urinary tract, which includes the kidneys, ureters, bladder, and urethra. Risk factors include ignoring your need to urinate, wiping back to front if female, being an uncircumcised male, and having diabetes or a weak immune system. Symptoms include frequent urination, pain or burning with urination, foul smelling urine, cloudy urine, pain in the lower abdomen, blood in the urine, and fever. If you were prescribed an antibiotic medicine, take it as told by your health care provider. Do not stop taking the antibiotic even if you start to feel better.    SEEK IMMEDIATE MEDICAL CARE IF YOU HAVE ANY OF THE FOLLOWING SYMPTOMS: severe back or abdominal pain, fever, inability to keep fluids or medicine down, dizziness/lightheadedness, or a change in mental status.

## 2021-07-22 NOTE — ED PROVIDER NOTE - OBJECTIVE STATEMENT
Pt sent from Dr. Rush for possible urinary retention  urinary retention 86 male type-2 DM C/C Pt sent from Dr. Rush for possible urinary retention, he has urinary BPH symptoms for months but now increased   no fever, no chills, no N/V, no back pain.

## 2021-07-22 NOTE — ED PROVIDER NOTE - CARE PROVIDER_API CALL
Haroon Vogt)  Urology  [address] [place] [address], [address]  [place] [address]  Phone: [phone]  Fax: [phone]  Follow Up Time: 1-3 Days

## 2021-07-24 LAB
CULTURE RESULTS: NO GROWTH — SIGNIFICANT CHANGE UP
SPECIMEN SOURCE: SIGNIFICANT CHANGE UP

## 2021-09-09 ENCOUNTER — NON-APPOINTMENT (OUTPATIENT)
Age: 86
End: 2021-09-09

## 2021-09-09 ENCOUNTER — APPOINTMENT (OUTPATIENT)
Dept: CARDIOLOGY | Facility: CLINIC | Age: 86
End: 2021-09-09
Payer: MEDICARE

## 2021-09-09 VITALS
SYSTOLIC BLOOD PRESSURE: 109 MMHG | DIASTOLIC BLOOD PRESSURE: 58 MMHG | OXYGEN SATURATION: 99 % | WEIGHT: 171 LBS | HEART RATE: 79 BPM | BODY MASS INDEX: 27.16 KG/M2 | HEIGHT: 66.5 IN

## 2021-09-09 DIAGNOSIS — I25.10 ATHEROSCLEROTIC HEART DISEASE OF NATIVE CORONARY ARTERY W/OUT ANGINA PECTORIS: ICD-10-CM

## 2021-09-09 DIAGNOSIS — R06.00 DYSPNEA, UNSPECIFIED: ICD-10-CM

## 2021-09-09 DIAGNOSIS — R33.9 RETENTION OF URINE, UNSPECIFIED: ICD-10-CM

## 2021-09-09 PROCEDURE — 99215 OFFICE O/P EST HI 40 MIN: CPT

## 2021-09-09 PROCEDURE — 93000 ELECTROCARDIOGRAM COMPLETE: CPT

## 2021-10-11 ENCOUNTER — INPATIENT (INPATIENT)
Facility: HOSPITAL | Age: 86
LOS: 8 days | Discharge: HOME | DRG: 920 | End: 2021-10-20
Attending: INTERNAL MEDICINE | Admitting: INTERNAL MEDICINE
Payer: MEDICARE

## 2021-10-11 VITALS
HEIGHT: 67 IN | DIASTOLIC BLOOD PRESSURE: 60 MMHG | TEMPERATURE: 97 F | RESPIRATION RATE: 16 BRPM | OXYGEN SATURATION: 98 % | SYSTOLIC BLOOD PRESSURE: 117 MMHG | HEART RATE: 98 BPM

## 2021-10-11 DIAGNOSIS — R31.9 HEMATURIA, UNSPECIFIED: ICD-10-CM

## 2021-10-11 DIAGNOSIS — I25.10 ATHEROSCLEROTIC HEART DISEASE OF NATIVE CORONARY ARTERY WITHOUT ANGINA PECTORIS: Chronic | ICD-10-CM

## 2021-10-11 DIAGNOSIS — E11.65 TYPE 2 DIABETES MELLITUS WITH HYPERGLYCEMIA: ICD-10-CM

## 2021-10-11 DIAGNOSIS — Z95.2 PRESENCE OF PROSTHETIC HEART VALVE: Chronic | ICD-10-CM

## 2021-10-11 LAB
ALBUMIN SERPL ELPH-MCNC: 2.7 G/DL — LOW (ref 3.3–5)
ALP SERPL-CCNC: 175 U/L — HIGH (ref 40–120)
ALT FLD-CCNC: 21 U/L — SIGNIFICANT CHANGE UP (ref 12–78)
ANION GAP SERPL CALC-SCNC: 10 MMOL/L — SIGNIFICANT CHANGE UP (ref 5–17)
APPEARANCE UR: ABNORMAL
APTT BLD: 28.5 SEC — SIGNIFICANT CHANGE UP (ref 27.5–35.5)
AST SERPL-CCNC: 14 U/L — LOW (ref 15–37)
BACTERIA # UR AUTO: ABNORMAL
BASOPHILS # BLD AUTO: 0.02 K/UL — SIGNIFICANT CHANGE UP (ref 0–0.2)
BASOPHILS NFR BLD AUTO: 0.1 % — SIGNIFICANT CHANGE UP (ref 0–2)
BILIRUB SERPL-MCNC: 0.8 MG/DL — SIGNIFICANT CHANGE UP (ref 0.2–1.2)
BILIRUB UR-MCNC: NEGATIVE — SIGNIFICANT CHANGE UP
BLD GP AB SCN SERPL QL: SIGNIFICANT CHANGE UP
BUN SERPL-MCNC: 30 MG/DL — HIGH (ref 7–23)
CALCIUM SERPL-MCNC: 8.5 MG/DL — SIGNIFICANT CHANGE UP (ref 8.5–10.1)
CHLORIDE SERPL-SCNC: 94 MMOL/L — LOW (ref 96–108)
CO2 SERPL-SCNC: 26 MMOL/L — SIGNIFICANT CHANGE UP (ref 22–31)
COLOR SPEC: ABNORMAL
CREAT SERPL-MCNC: 2 MG/DL — HIGH (ref 0.5–1.3)
DIFF PNL FLD: ABNORMAL
EOSINOPHIL # BLD AUTO: 0 K/UL — SIGNIFICANT CHANGE UP (ref 0–0.5)
EOSINOPHIL NFR BLD AUTO: 0 % — SIGNIFICANT CHANGE UP (ref 0–6)
EPI CELLS # UR: SIGNIFICANT CHANGE UP
GLUCOSE SERPL-MCNC: 422 MG/DL — HIGH (ref 70–99)
GLUCOSE UR QL: 1000 MG/DL
HCT VFR BLD CALC: 22 % — LOW (ref 39–50)
HGB BLD-MCNC: 7.1 G/DL — LOW (ref 13–17)
IMM GRANULOCYTES NFR BLD AUTO: 1 % — SIGNIFICANT CHANGE UP (ref 0–1.5)
INR BLD: 1.44 RATIO — HIGH (ref 0.88–1.16)
KETONES UR-MCNC: ABNORMAL
LEUKOCYTE ESTERASE UR-ACNC: ABNORMAL
LYMPHOCYTES # BLD AUTO: 0.64 K/UL — LOW (ref 1–3.3)
LYMPHOCYTES # BLD AUTO: 4.5 % — LOW (ref 13–44)
MCHC RBC-ENTMCNC: 29.7 PG — SIGNIFICANT CHANGE UP (ref 27–34)
MCHC RBC-ENTMCNC: 32.3 GM/DL — SIGNIFICANT CHANGE UP (ref 32–36)
MCV RBC AUTO: 92.1 FL — SIGNIFICANT CHANGE UP (ref 80–100)
MONOCYTES # BLD AUTO: 0.53 K/UL — SIGNIFICANT CHANGE UP (ref 0–0.9)
MONOCYTES NFR BLD AUTO: 3.8 % — SIGNIFICANT CHANGE UP (ref 2–14)
NEUTROPHILS # BLD AUTO: 12.76 K/UL — HIGH (ref 1.8–7.4)
NEUTROPHILS NFR BLD AUTO: 90.6 % — HIGH (ref 43–77)
NITRITE UR-MCNC: POSITIVE
NRBC # BLD: 0 /100 WBCS — SIGNIFICANT CHANGE UP (ref 0–0)
PH UR: 7 — SIGNIFICANT CHANGE UP (ref 5–8)
PLATELET # BLD AUTO: 295 K/UL — SIGNIFICANT CHANGE UP (ref 150–400)
POTASSIUM SERPL-MCNC: 4.4 MMOL/L — SIGNIFICANT CHANGE UP (ref 3.5–5.3)
POTASSIUM SERPL-SCNC: 4.4 MMOL/L — SIGNIFICANT CHANGE UP (ref 3.5–5.3)
PROT SERPL-MCNC: 7.2 G/DL — SIGNIFICANT CHANGE UP (ref 6–8.3)
PROT UR-MCNC: 500 MG/DL
PROTHROM AB SERPL-ACNC: 16.6 SEC — HIGH (ref 10.6–13.6)
RAPID RVP RESULT: SIGNIFICANT CHANGE UP
RBC # BLD: 2.39 M/UL — LOW (ref 4.2–5.8)
RBC # FLD: 14.4 % — SIGNIFICANT CHANGE UP (ref 10.3–14.5)
RBC CASTS # UR COMP ASSIST: >50 /HPF (ref 0–4)
SARS-COV-2 RNA SPEC QL NAA+PROBE: SIGNIFICANT CHANGE UP
SODIUM SERPL-SCNC: 130 MMOL/L — LOW (ref 135–145)
SP GR SPEC: 1.01 — SIGNIFICANT CHANGE UP (ref 1.01–1.02)
UROBILINOGEN FLD QL: NEGATIVE — SIGNIFICANT CHANGE UP
WBC # BLD: 14.09 K/UL — HIGH (ref 3.8–10.5)
WBC # FLD AUTO: 14.09 K/UL — HIGH (ref 3.8–10.5)
WBC UR QL: ABNORMAL

## 2021-10-11 PROCEDURE — 71045 X-RAY EXAM CHEST 1 VIEW: CPT | Mod: 26

## 2021-10-11 PROCEDURE — 99285 EMERGENCY DEPT VISIT HI MDM: CPT | Mod: 25

## 2021-10-11 PROCEDURE — 51705 CHANGE OF BLADDER TUBE: CPT

## 2021-10-11 RX ORDER — CEFTRIAXONE 500 MG/1
INJECTION, POWDER, FOR SOLUTION INTRAMUSCULAR; INTRAVENOUS
Refills: 0 | Status: DISCONTINUED | OUTPATIENT
Start: 2021-10-11 | End: 2021-10-19

## 2021-10-11 RX ORDER — LANOLIN ALCOHOL/MO/W.PET/CERES
3 CREAM (GRAM) TOPICAL AT BEDTIME
Refills: 0 | Status: DISCONTINUED | OUTPATIENT
Start: 2021-10-11 | End: 2021-10-20

## 2021-10-11 RX ORDER — ACETAMINOPHEN 500 MG
650 TABLET ORAL EVERY 6 HOURS
Refills: 0 | Status: DISCONTINUED | OUTPATIENT
Start: 2021-10-11 | End: 2021-10-20

## 2021-10-11 RX ORDER — INSULIN LISPRO 100/ML
VIAL (ML) SUBCUTANEOUS AT BEDTIME
Refills: 0 | Status: DISCONTINUED | OUTPATIENT
Start: 2021-10-12 | End: 2021-10-12

## 2021-10-11 RX ORDER — INSULIN LISPRO 100/ML
VIAL (ML) SUBCUTANEOUS
Refills: 0 | Status: DISCONTINUED | OUTPATIENT
Start: 2021-10-11 | End: 2021-10-12

## 2021-10-11 RX ORDER — DEXTROSE 50 % IN WATER 50 %
25 SYRINGE (ML) INTRAVENOUS ONCE
Refills: 0 | Status: DISCONTINUED | OUTPATIENT
Start: 2021-10-11 | End: 2021-10-20

## 2021-10-11 RX ORDER — CEFTRIAXONE 500 MG/1
1000 INJECTION, POWDER, FOR SOLUTION INTRAMUSCULAR; INTRAVENOUS ONCE
Refills: 0 | Status: COMPLETED | OUTPATIENT
Start: 2021-10-11 | End: 2021-10-11

## 2021-10-11 RX ORDER — SODIUM CHLORIDE 9 MG/ML
1000 INJECTION, SOLUTION INTRAVENOUS
Refills: 0 | Status: DISCONTINUED | OUTPATIENT
Start: 2021-10-11 | End: 2021-10-20

## 2021-10-11 RX ORDER — SODIUM CHLORIDE 9 MG/ML
1000 INJECTION INTRAMUSCULAR; INTRAVENOUS; SUBCUTANEOUS
Refills: 0 | Status: DISCONTINUED | OUTPATIENT
Start: 2021-10-11 | End: 2021-10-15

## 2021-10-11 RX ORDER — SODIUM CHLORIDE 9 MG/ML
1000 INJECTION INTRAMUSCULAR; INTRAVENOUS; SUBCUTANEOUS ONCE
Refills: 0 | Status: COMPLETED | OUTPATIENT
Start: 2021-10-11 | End: 2021-10-11

## 2021-10-11 RX ORDER — GLUCAGON INJECTION, SOLUTION 0.5 MG/.1ML
1 INJECTION, SOLUTION SUBCUTANEOUS ONCE
Refills: 0 | Status: DISCONTINUED | OUTPATIENT
Start: 2021-10-11 | End: 2021-10-20

## 2021-10-11 RX ORDER — DEXTROSE 50 % IN WATER 50 %
15 SYRINGE (ML) INTRAVENOUS ONCE
Refills: 0 | Status: DISCONTINUED | OUTPATIENT
Start: 2021-10-11 | End: 2021-10-20

## 2021-10-11 RX ORDER — INSULIN LISPRO 100/ML
10 VIAL (ML) SUBCUTANEOUS ONCE
Refills: 0 | Status: COMPLETED | OUTPATIENT
Start: 2021-10-11 | End: 2021-10-11

## 2021-10-11 RX ORDER — CEFTRIAXONE 500 MG/1
1000 INJECTION, POWDER, FOR SOLUTION INTRAMUSCULAR; INTRAVENOUS EVERY 24 HOURS
Refills: 0 | Status: DISCONTINUED | OUTPATIENT
Start: 2021-10-12 | End: 2021-10-19

## 2021-10-11 RX ORDER — DEXTROSE 50 % IN WATER 50 %
12.5 SYRINGE (ML) INTRAVENOUS ONCE
Refills: 0 | Status: DISCONTINUED | OUTPATIENT
Start: 2021-10-11 | End: 2021-10-20

## 2021-10-11 RX ORDER — ONDANSETRON 8 MG/1
4 TABLET, FILM COATED ORAL EVERY 8 HOURS
Refills: 0 | Status: DISCONTINUED | OUTPATIENT
Start: 2021-10-11 | End: 2021-10-20

## 2021-10-11 RX ADMIN — CEFTRIAXONE 100 MILLIGRAM(S): 500 INJECTION, POWDER, FOR SOLUTION INTRAMUSCULAR; INTRAVENOUS at 23:54

## 2021-10-11 RX ADMIN — Medication 10 UNIT(S): at 20:27

## 2021-10-11 RX ADMIN — SODIUM CHLORIDE 1000 MILLILITER(S): 9 INJECTION INTRAMUSCULAR; INTRAVENOUS; SUBCUTANEOUS at 20:27

## 2021-10-11 RX ADMIN — SODIUM CHLORIDE 1000 MILLILITER(S): 9 INJECTION INTRAMUSCULAR; INTRAVENOUS; SUBCUTANEOUS at 21:27

## 2021-10-11 NOTE — ED PROVIDER NOTE - CLINICAL SUMMARY MEDICAL DECISION MAKING FREE TEXT BOX
pt with hematuria , clot retention, pt with cr 2.0,  cbi started, hgb 7.0.   transfuse, urol consult, gluc 400+, no gap check ketones, ivf, insulin,   med to fu sugars, admit tele

## 2021-10-11 NOTE — ED PROVIDER NOTE - CONSULTING PHYSICIAN
VOICEMAIL: pt was put on meds last week for a cold, not its in her chest and wondering what she can do now, call back kain

## 2021-10-11 NOTE — ED PROVIDER NOTE - CHPI ED SYMPTOMS NEG
no blood in stool/no diarrhea/no dysuria/no fever/no nausea/no vomiting/no burning urination/no chills

## 2021-10-11 NOTE — ED PROVIDER NOTE - CARE PLAN
Principal Discharge DX:	Hematuria  Secondary Diagnosis:	Renal insufficiency  Secondary Diagnosis:	Renal insufficiency   1

## 2021-10-11 NOTE — ED PROVIDER NOTE - NSICDXPASTSURGICALHX_GEN_ALL_CORE_FT
PAST SURGICAL HISTORY:  CAD (coronary artery disease) cardiac stent    History of aortic valve replacement

## 2021-10-11 NOTE — ED PROVIDER NOTE - CPE EDP SKIN NORM
Patient calling received a call about getting a video gram - patient is confused and asking to speak to the nurse -     Advised Dr ALEMAN' offices are out today - patient requesting a call back later today or tomorrow -    normal...

## 2021-10-11 NOTE — ED PROVIDER NOTE - OBJECTIVE STATEMENT
Pt is an 85 yo male hx avr, dm, cad on eloquis prior to turp on 10/1.  pt had been doing well post procudure and then yesterday began with some hematuria and his urologist placed sherman and irrigated clots. pt now not passing urine and with bladder pressure/pain, no dizziness, sob, cp, pt denies other compliaints

## 2021-10-11 NOTE — ED ADULT NURSE NOTE - URINE CHARACTERISTICS
no abdominal pain, no bloating, no constipation, no diarrhea, no nausea and no vomiting.
clots present

## 2021-10-12 DIAGNOSIS — R31.0 GROSS HEMATURIA: ICD-10-CM

## 2021-10-12 DIAGNOSIS — N17.9 ACUTE KIDNEY FAILURE, UNSPECIFIED: ICD-10-CM

## 2021-10-12 DIAGNOSIS — D62 ACUTE POSTHEMORRHAGIC ANEMIA: ICD-10-CM

## 2021-10-12 DIAGNOSIS — I48.91 UNSPECIFIED ATRIAL FIBRILLATION: ICD-10-CM

## 2021-10-12 DIAGNOSIS — I10 ESSENTIAL (PRIMARY) HYPERTENSION: ICD-10-CM

## 2021-10-12 LAB
A1C WITH ESTIMATED AVERAGE GLUCOSE RESULT: 7 % — HIGH (ref 4–5.6)
ANION GAP SERPL CALC-SCNC: 10 MMOL/L — SIGNIFICANT CHANGE UP (ref 5–17)
BUN SERPL-MCNC: 34 MG/DL — HIGH (ref 7–23)
CALCIUM SERPL-MCNC: 8.5 MG/DL — SIGNIFICANT CHANGE UP (ref 8.5–10.1)
CHLORIDE SERPL-SCNC: 96 MMOL/L — SIGNIFICANT CHANGE UP (ref 96–108)
CK MB BLD-MCNC: 7.8 % — HIGH (ref 0–3.5)
CK MB CFR SERPL CALC: 3.8 NG/ML — HIGH (ref 0–3.6)
CK SERPL-CCNC: 49 U/L — SIGNIFICANT CHANGE UP (ref 26–308)
CO2 SERPL-SCNC: 25 MMOL/L — SIGNIFICANT CHANGE UP (ref 22–31)
CREAT SERPL-MCNC: 2 MG/DL — HIGH (ref 0.5–1.3)
CULTURE RESULTS: SIGNIFICANT CHANGE UP
ESTIMATED AVERAGE GLUCOSE: 154 MG/DL — HIGH (ref 68–114)
GLUCOSE SERPL-MCNC: 334 MG/DL — HIGH (ref 70–99)
HCT VFR BLD CALC: 20.9 % — CRITICAL LOW (ref 39–50)
HCT VFR BLD CALC: 24.3 % — LOW (ref 39–50)
HGB BLD-MCNC: 7 G/DL — CRITICAL LOW (ref 13–17)
HGB BLD-MCNC: 8.1 G/DL — LOW (ref 13–17)
MAGNESIUM SERPL-MCNC: 2.1 MG/DL — SIGNIFICANT CHANGE UP (ref 1.6–2.6)
MCHC RBC-ENTMCNC: 29.9 PG — SIGNIFICANT CHANGE UP (ref 27–34)
MCHC RBC-ENTMCNC: 33.5 GM/DL — SIGNIFICANT CHANGE UP (ref 32–36)
MCV RBC AUTO: 89.3 FL — SIGNIFICANT CHANGE UP (ref 80–100)
NRBC # BLD: 0 /100 WBCS — SIGNIFICANT CHANGE UP (ref 0–0)
PHOSPHATE SERPL-MCNC: 4.2 MG/DL — SIGNIFICANT CHANGE UP (ref 2.5–4.5)
PLATELET # BLD AUTO: 271 K/UL — SIGNIFICANT CHANGE UP (ref 150–400)
POTASSIUM SERPL-MCNC: 4.3 MMOL/L — SIGNIFICANT CHANGE UP (ref 3.5–5.3)
POTASSIUM SERPL-SCNC: 4.3 MMOL/L — SIGNIFICANT CHANGE UP (ref 3.5–5.3)
RBC # BLD: 2.34 M/UL — LOW (ref 4.2–5.8)
RBC # FLD: 14.9 % — HIGH (ref 10.3–14.5)
SODIUM SERPL-SCNC: 131 MMOL/L — LOW (ref 135–145)
SPECIMEN SOURCE: SIGNIFICANT CHANGE UP
TROPONIN I SERPL-MCNC: 0.19 NG/ML — HIGH (ref 0.01–0.04)
WBC # BLD: 15.08 K/UL — HIGH (ref 3.8–10.5)
WBC # FLD AUTO: 15.08 K/UL — HIGH (ref 3.8–10.5)

## 2021-10-12 PROCEDURE — 93010 ELECTROCARDIOGRAM REPORT: CPT | Mod: 76

## 2021-10-12 PROCEDURE — 99223 1ST HOSP IP/OBS HIGH 75: CPT

## 2021-10-12 RX ORDER — INSULIN LISPRO 100/ML
VIAL (ML) SUBCUTANEOUS AT BEDTIME
Refills: 0 | Status: DISCONTINUED | OUTPATIENT
Start: 2021-10-12 | End: 2021-10-20

## 2021-10-12 RX ORDER — UBIDECARENONE 100 MG
1 CAPSULE ORAL
Qty: 0 | Refills: 0 | DISCHARGE

## 2021-10-12 RX ORDER — METOPROLOL TARTRATE 50 MG
50 TABLET ORAL ONCE
Refills: 0 | Status: COMPLETED | OUTPATIENT
Start: 2021-10-12 | End: 2021-10-12

## 2021-10-12 RX ORDER — INSULIN LISPRO 100/ML
VIAL (ML) SUBCUTANEOUS
Refills: 0 | Status: DISCONTINUED | OUTPATIENT
Start: 2021-10-12 | End: 2021-10-20

## 2021-10-12 RX ORDER — METFORMIN HYDROCHLORIDE 850 MG/1
1 TABLET ORAL
Qty: 0 | Refills: 0 | DISCHARGE

## 2021-10-12 RX ORDER — ASPIRIN/CALCIUM CARB/MAGNESIUM 324 MG
1 TABLET ORAL
Qty: 0 | Refills: 0 | DISCHARGE

## 2021-10-12 RX ORDER — AMIODARONE HYDROCHLORIDE 400 MG/1
1 TABLET ORAL
Qty: 0 | Refills: 0 | DISCHARGE

## 2021-10-12 RX ORDER — AMIODARONE HYDROCHLORIDE 400 MG/1
200 TABLET ORAL DAILY
Refills: 0 | Status: DISCONTINUED | OUTPATIENT
Start: 2021-10-12 | End: 2021-10-20

## 2021-10-12 RX ORDER — GLIMEPIRIDE 1 MG
1 TABLET ORAL
Qty: 0 | Refills: 0 | DISCHARGE

## 2021-10-12 RX ORDER — INFLUENZA VIRUS VACCINE 15; 15; 15; 15 UG/.5ML; UG/.5ML; UG/.5ML; UG/.5ML
0.5 SUSPENSION INTRAMUSCULAR ONCE
Refills: 0 | Status: DISCONTINUED | OUTPATIENT
Start: 2021-10-12 | End: 2021-10-20

## 2021-10-12 RX ORDER — INSULIN GLARGINE 100 [IU]/ML
10 INJECTION, SOLUTION SUBCUTANEOUS AT BEDTIME
Refills: 0 | Status: DISCONTINUED | OUTPATIENT
Start: 2021-10-12 | End: 2021-10-13

## 2021-10-12 RX ORDER — TAMSULOSIN HYDROCHLORIDE 0.4 MG/1
1 CAPSULE ORAL
Qty: 0 | Refills: 0 | DISCHARGE

## 2021-10-12 RX ORDER — APIXABAN 2.5 MG/1
1 TABLET, FILM COATED ORAL
Qty: 0 | Refills: 0 | DISCHARGE

## 2021-10-12 RX ORDER — FUROSEMIDE 40 MG
1 TABLET ORAL
Qty: 0 | Refills: 0 | DISCHARGE

## 2021-10-12 RX ORDER — METOPROLOL TARTRATE 50 MG
1 TABLET ORAL
Qty: 0 | Refills: 0 | DISCHARGE

## 2021-10-12 RX ADMIN — Medication 6: at 07:45

## 2021-10-12 RX ADMIN — Medication 3 MILLIGRAM(S): at 22:14

## 2021-10-12 RX ADMIN — Medication 50 MILLIGRAM(S): at 04:51

## 2021-10-12 RX ADMIN — SODIUM CHLORIDE 75 MILLILITER(S): 9 INJECTION INTRAMUSCULAR; INTRAVENOUS; SUBCUTANEOUS at 20:14

## 2021-10-12 RX ADMIN — INSULIN GLARGINE 10 UNIT(S): 100 INJECTION, SOLUTION SUBCUTANEOUS at 22:15

## 2021-10-12 RX ADMIN — Medication 4: at 18:00

## 2021-10-12 RX ADMIN — CEFTRIAXONE 100 MILLIGRAM(S): 500 INJECTION, POWDER, FOR SOLUTION INTRAMUSCULAR; INTRAVENOUS at 22:27

## 2021-10-12 RX ADMIN — Medication 8: at 12:50

## 2021-10-12 RX ADMIN — AMIODARONE HYDROCHLORIDE 200 MILLIGRAM(S): 400 TABLET ORAL at 07:03

## 2021-10-12 NOTE — H&P ADULT - PROBLEM SELECTOR PLAN 5
2/2 hematuria  Monitor Cr  Avoid nephrotoxic medications  Renal consult  Further work-up/management pending clinical course.

## 2021-10-12 NOTE — CONSULT NOTE ADULT - PROBLEM SELECTOR RECOMMENDATION 9
Sherman irrigated for clots, and replaced on cbi, efflux light pink. Anemia 2/2 blood loss. Trasfuse as necessary, maintain sherman and cbi
oral anti-diabetes agents on hold  change mod dose admelog corrective scale coverage qac/qhs  add lantus 10 units qhs  to add standing pre-meal admelog if pt tolerating po intake  goal bg 100-180 in hosp setting

## 2021-10-12 NOTE — CONSULT NOTE ADULT - SUBJECTIVE AND OBJECTIVE BOX
Patient is a 86y old  Male who presents with a chief complaint of bloody urine.     HPI:  87 yo male with PMH afib (on Eliquis), BPH, CAD (s/p stent ), Aortic valve replacement () presents to ED hematuria found to be in Afib with RVR. Patient denies chest pain, palpitations, shortness of breath, orthopnea.   Admits to [facility] for past year     Renal consult called for RANDOLPH. History obtained from chart and patient.     PAST MEDICAL HISTORY:  Diabetes mellitus        PAST SURGICAL HISTORY:  History of aortic valve replacement    CAD (coronary artery disease)        FAMILY HISTORY:  No pertinent family history in first degree relatives        SOCIAL HISTORY: No smoking or alcohol use     Allergies    No Known Allergies    Intolerances      Home Medications:  amLODIPine 5 mg oral tablet: 1 tab(s) orally once a day (2021 08:22)  Aspir 81 oral delayed release tablet: 1 tab(s) orally once a day (2021 08:23)  Eliquis 5 mg oral tablet: 1 tab(s) orally 2 times a day (2021 08:23)  furosemide 80 mg oral tablet: 1 tab(s) orally once a day (2021 08:24)  glimepiride 4 mg oral tablet: 1 tab(s) orally 2 times a day (2021 08:25)  metFORMIN 1000 mg oral tablet: 1 tab(s) orally 2 times a day (2021 08:22)  metoprolol succinate 50 mg oral tablet, extended release: 1 tab(s) orally once a day (2021 08:24)    MEDICATIONS  (STANDING):  aMIOdarone    Tablet 200 milliGRAM(s) Oral daily  cefTRIAXone   IVPB      cefTRIAXone   IVPB 1000 milliGRAM(s) IV Intermittent every 24 hours  dextrose 40% Gel 15 Gram(s) Oral once  dextrose 5%. 1000 milliLiter(s) (50 mL/Hr) IV Continuous <Continuous>  dextrose 5%. 1000 milliLiter(s) (100 mL/Hr) IV Continuous <Continuous>  dextrose 50% Injectable 25 Gram(s) IV Push once  dextrose 50% Injectable 12.5 Gram(s) IV Push once  dextrose 50% Injectable 25 Gram(s) IV Push once  glucagon  Injectable 1 milliGRAM(s) IntraMuscular once  insulin lispro (ADMELOG) corrective regimen sliding scale   SubCutaneous three times a day before meals  insulin lispro (ADMELOG) corrective regimen sliding scale   SubCutaneous at bedtime  sodium chloride 0.9%. 1000 milliLiter(s) (100 mL/Hr) IV Continuous <Continuous>    MEDICATIONS  (PRN):  acetaminophen   Tablet .. 650 milliGRAM(s) Oral every 6 hours PRN Temp greater or equal to 38C (100.4F), Mild Pain (1 - 3)  aluminum hydroxide/magnesium hydroxide/simethicone Suspension 30 milliLiter(s) Oral every 4 hours PRN Dyspepsia  melatonin 3 milliGRAM(s) Oral at bedtime PRN Insomnia  ondansetron Injectable 4 milliGRAM(s) IV Push every 8 hours PRN Nausea and/or Vomiting      REVIEW OF SYSTEMS:  General: no distress  Respiratory: No cough, SOB  Cardiovascular: No CP or Palpitations	  Gastrointestinal: No nausea, Vomiting. No diarrhea  Genitourinary: No urinary complaints	  Musculoskeletal: No new rash or lesions		  all other systems negative    T(F): 97.9 (10-12-21 @ 07:59), Max: 99.2 (10-11-21 @ 22:32)  HR: 110 (10-12-21 @ 07:59) (98 - 116)  BP: 105/68 (10-12-21 @ 07:59) (102/58 - 122/56)  RR: 14 (10-12-21 @ 07:00) (14 - 18)  SpO2: 98% (10-12-21 @ 07:59) (97% - 100%)  Wt(kg): --    PHYSICAL EXAM:  General: NAD  Respiratory: b/l air entry  Cardiovascular: S1 S2  Gastrointestinal: soft  Extremities: edema        10-12    131<L>  |  96  |  34<H>  ----------------------------<  334<H>  4.3   |  25  |  2.00<H>    Ca    8.5      12 Oct 2021 04:56  Phos  4.2     10-12  Mg     2.1     10-12    TPro  7.2  /  Alb  2.7<L>  /  TBili  0.8  /  DBili  x   /  AST  14<L>  /  ALT  21  /  AlkPhos  175<H>  10-11                          7.0    15.08 )-----------( 271      ( 12 Oct 2021 04:55 )             20.9       Potassium, Serum: 4.3 mmol/L (10-12 @ 04:56)  Blood Urea Nitrogen, Serum: 34 mg/dL (10-12 @ 04:56)  Calcium, Total Serum: 8.5 mg/dL (10-12 @ 04:56)  Hemoglobin: 7.0 g/dL (10-12 @ 04:55)      Creatinine, Serum: 2.00 (10-12 @ 04:56)  Creatinine, Serum: 2.00 (10-11 @ 18:27)      Urinalysis Basic - ( 11 Oct 2021 18:27 )    Color: Red / Appearance: bloody / S.015 / pH: x  Gluc: x / Ketone: Trace  / Bili: Negative / Urobili: Negative   Blood: x / Protein: 500 mg/dL / Nitrite: Positive   Leuk Esterase: Trace / RBC: >50 /HPF / WBC 11-25   Sq Epi: x / Non Sq Epi: Occasional / Bacteria: Occasional      LIVER FUNCTIONS - ( 11 Oct 2021 18:27 )  Alb: 2.7 g/dL / Pro: 7.2 g/dL / ALK PHOS: 175 U/L / ALT: 21 U/L / AST: 14 U/L / GGT: x           CARDIAC MARKERS ( 12 Oct 2021 04:56 )  .186 ng/mL / x     / 49 U/L / x     / 3.8 ng/mL      Creatine Kinase, Serum: 49 U/L (10-12-21 @ 04:56)        `

## 2021-10-12 NOTE — CONSULT NOTE ADULT - ASSESSMENT
? RANDOLPH Vs subacute: Crea 2.2 07/2021, Crea 1.3 02/2021  Hematuria, s/p recent TURP  A fib  Hypertension  Diabetes     ? RANDOLPH Vs subacute: Crea 2.2 07/2021, Crea 1.3 02/2021  Hematuria, s/p recent TURP  A fib  Hypertension  Diabetes    IV hydration. On CBI. Hold diuretics. Monitor blood sugar levels. Insulin coverage as needed. Dietary restriction.   Monitor h/h trend. Transfuse PRN. Monitor BP trend. Titrate BP meds as needed. Salt restriction. Cardiology follow up for rapid afib.   Will follow electrolytes and renal function trend. Further recommendations pending clinical course. Thank you for the courtesy of this referral.

## 2021-10-12 NOTE — CONSULT NOTE ADULT - ATTENDING COMMENTS
rapid atrial fibrillation on admission  likely from missed bb dose for the last few days  had dccv in the past, on amio  was in af at the last office visit with dr santillan, and was on amio and in rate controlled af  can cont amio for now, though ultimately if there is not going to be an attempt at restoration of sr, it should be transitioned off, with perhaps an alternative avn blocker added  cont regular bb dose and increase if needed  resume ac when ok with gu  mild demand troponin in the setting of tachycardia and severe anemia, trend trop to peak  will follow

## 2021-10-12 NOTE — H&P ADULT - HISTORY OF PRESENT ILLNESS
85 yo male with PMH afib (on Eliquis, s/p BILLY cardioversion 7/2021), BPH, CAD (s/p stent 2005), Aortic valve replacement (2001) presents to ED hematuria found to be in Afib with RVR. Patient denies chest pain, palpitations, shortness of breath, orthopnea. Admits to LE edema for past year for which he has been given lasix. Patient reports missing his medication for past 1.5 days because he was not feeling well and dealing with his prostate/urology issues. Patient states his eliquis was held for 5 days prior to his urology procedure (?TURP) on 10/1/21.

## 2021-10-12 NOTE — CHART NOTE - NSCHARTNOTEFT_GEN_A_CORE
Called by RN for a fib with rvr to 130s. Patient receiving 1 pRBCs for anemia 2/2 to hematuria and recent urologic procedure. patient admitted for cbi, passing blood clots. patient admits to history of a fib. cardio mikel murphy.   uponr reviewing [facility] records. patient was prescribe metoprolol 50 mg XL, amiodarone 200 mg daily. patient admits he takes amiodarone daily however does not take the toprol.   He currently denies any chest pain, palpitations, sob or dizziness.     EKG: rapid a fib with RVR 1  T(C): 37.2 (10-12-21 @ 00:45), Max: 37.3 (10-11-21 @ 22:32)  HR: 112 (10-12-21 @ 00:45) (98 - 112)  BP: 108/53 (10-12-21 @ 00:45) (102/58 - 117/60)  RR: 16 (10-12-21 @ 00:45) (16 - 18)  SpO2: 100% (10-12-21 @ 00:45) (98% - 100%)  Wt(kg): --    Physical :  Gen- NAD, ncat  Cardio - s+1,s+2, + irregularly irregular tachycardic,   Lung - cta b/l, no wheeze, no rhonchi, no rales   Abdomen- +BS, NT/ND, no guarding, no rebound, no masses  Ext- no edema, 2+ pulses b/l  Neuro- aao X 4, moving all extremities, conversing appropiately.     LABS:                        7.1    14.09 )-----------( 295      ( 11 Oct 2021 18:27 )             22.0     1011    130<L>  |  94<L>  |  30<H>  ----------------------------<  422<H>  4.4   |  26  |  2.00<H>    Ca    8.5      11 Oct 2021 18:27    TPro  7.2  /  Alb  2.7<L>  /  TBili  0.8  /  DBili  x   /  AST  14<L>  /  ALT  21  /  AlkPhos  175<H>  1011    PT/INR - ( 11 Oct 2021 18:27 )   PT: 16.6 sec;   INR: 1.44 ratio         PTT - ( 11 Oct 2021 18:27 )  PTT:28.5 sec  Urinalysis Basic - ( 11 Oct 2021 18:27 )    Color: Red / Appearance: bloody / S.015 / pH: x  Gluc: x / Ketone: Trace  / Bili: Negative / Urobili: Negative   Blood: x / Protein: 500 mg/dL / Nitrite: Positive   Leuk Esterase: Trace / RBC: >50 /HPF / WBC 11-25   Sq Epi: x / Non Sq Epi: Occasional / Bacteria: Occasional        Assessment/Plan  86-year-old male with a history of coronary artery disease (status post PCI to LAD 2005), bioprosthetic aortic valve replacement (), mild mitral regurgitation, moderate tricuspid regurgitation, diabetes, a dyslipidemia, hypertension, peripheral vascular disease, and moderate carotid atherosclerosis admitted for anemia 2/2 to hematuria due to complications from catheter, now with rapid a fib with rvr.    1. Rapid A fib with rvr  - non sustained, patient rate fluctuates   - likely 2/2 to increased demand in the setting of anemia   - will give toprol xl 50 mg X 1  - patient is on eliquis at home, however temporarily paused prior to and after urologic procedure as per patient   - restart amiodarone 200 mg daily (home dose)  - would recommend cardiology consult Called by RN for a fib with rvr to 130s. Patient receiving 1 pRBCs for anemia 2/2 to hematuria and recent urologic procedure. patient admitted for cbi, passing blood clots. patient admits to history of a fib. cardio mikel murphy.   uponr reviewing [facility] records. patient was prescribe metoprolol 50 mg XL, amiodarone 200 mg daily. patient admits he takes amiodarone daily however does not take the toprol.   He currently denies any chest pain, palpitations, sob or dizziness.     EKG: rapid a fib with  bpm   T(C): 37.2 (10-12-21 @ 00:45), Max: 37.3 (10-11-21 @ 22:32)  HR: 112 (10-12-21 @ 00:45) (98 - 112)  BP: 108/53 (10-12-21 @ 00:45) (102/58 - 117/60)  RR: 16 (10-12-21 @ 00:45) (16 - 18)  SpO2: 100% (10-12-21 @ 00:45) (98% - 100%)  Wt(kg): --    Physical :  Gen- NAD, ncat  Cardio - s+1,s+2, + irregularly irregular tachycardic,   Lung - cta b/l, no wheeze, no rhonchi, no rales   Abdomen- +BS, NT/ND, no guarding, no rebound, no masses  Ext- no edema, 2+ pulses b/l  Neuro- aao X 4, moving all extremities, conversing appropiately.     LABS:                        7.1    14.09 )-----------( 295      ( 11 Oct 2021 18:27 )             22.0     10-11    130<L>  |  94<L>  |  30<H>  ----------------------------<  422<H>  4.4   |  26  |  2.00<H>    Ca    8.5      11 Oct 2021 18:27    TPro  7.2  /  Alb  2.7<L>  /  TBili  0.8  /  DBili  x   /  AST  14<L>  /  ALT  21  /  AlkPhos  175<H>  10-11    PT/INR - ( 11 Oct 2021 18:27 )   PT: 16.6 sec;   INR: 1.44 ratio         PTT - ( 11 Oct 2021 18:27 )  PTT:28.5 sec  Urinalysis Basic - ( 11 Oct 2021 18:27 )    Color: Red / Appearance: bloody / S.015 / pH: x  Gluc: x / Ketone: Trace  / Bili: Negative / Urobili: Negative   Blood: x / Protein: 500 mg/dL / Nitrite: Positive   Leuk Esterase: Trace / RBC: >50 /HPF / WBC 11-25   Sq Epi: x / Non Sq Epi: Occasional / Bacteria: Occasional        Assessment/Plan  86-year-old male with a history of coronary artery disease (status post PCI to LAD 2005), bioprosthetic aortic valve replacement (), mild mitral regurgitation, moderate tricuspid regurgitation, diabetes, a dyslipidemia, hypertension, peripheral vascular disease, and moderate carotid atherosclerosis admitted for anemia 2/2 to hematuria due to complications from catheter, now with rapid a fib with rvr.    1. Rapid A fib with rvr  - non sustained, patient rate fluctuates 88-130s  - likely 2/2 to increased demand in the setting of anemia   - will give toprol xl 50 mg X 1  - patient is on eliquis at home, however temporarily paused prior to and after urologic procedure as per patient   - restart amiodarone 200 mg daily (home dose)  - would recommend cardiology consult Called by RN for a fib with rvr to 130s. Patient receiving 1 pRBCs for anemia 2/2 to hematuria and recent urologic procedure. patient admitted for cbi, passing blood clots. patient admits to history of a fib. cardio mikel murphy.   uponr reviewing [facility] records. patient was prescribe metoprolol 50 mg XL, amiodarone 200 mg daily. patient admits he takes amiodarone daily however does not take the toprol.   He currently denies any chest pain, palpitations, sob or dizziness.     EKG: rapid a fib with  bpm   T(C): 37.2 (10-12-21 @ 00:45), Max: 37.3 (10-11-21 @ 22:32)  HR: 112 (10-12-21 @ 00:45) (98 - 112)  BP: 108/53 (10-12-21 @ 00:45) (102/58 - 117/60)  RR: 16 (10-12-21 @ 00:45) (16 - 18)  SpO2: 100% (10-12-21 @ 00:45) (98% - 100%)  Wt(kg): --    Physical :  Gen- NAD, ncat  Cardio - s+1,s+2, + irregularly irregular tachycardic,   Lung - cta b/l, no wheeze, no rhonchi, no rales   Abdomen- +BS, NT/ND, no guarding, no rebound, no masses  Ext- no edema, 2+ pulses b/l  Neuro- aao X 4, moving all extremities, conversing appropiately.     LABS:                        7.1    14.09 )-----------( 295      ( 11 Oct 2021 18:27 )             22.0     10-11    130<L>  |  94<L>  |  30<H>  ----------------------------<  422<H>  4.4   |  26  |  2.00<H>    Ca    8.5      11 Oct 2021 18:27    TPro  7.2  /  Alb  2.7<L>  /  TBili  0.8  /  DBili  x   /  AST  14<L>  /  ALT  21  /  AlkPhos  175<H>  10-11    PT/INR - ( 11 Oct 2021 18:27 )   PT: 16.6 sec;   INR: 1.44 ratio         PTT - ( 11 Oct 2021 18:27 )  PTT:28.5 sec  Urinalysis Basic - ( 11 Oct 2021 18:27 )    Color: Red / Appearance: bloody / S.015 / pH: x  Gluc: x / Ketone: Trace  / Bili: Negative / Urobili: Negative   Blood: x / Protein: 500 mg/dL / Nitrite: Positive   Leuk Esterase: Trace / RBC: >50 /HPF / WBC 11-25   Sq Epi: x / Non Sq Epi: Occasional / Bacteria: Occasional        Assessment/Plan  86-year-old male with a history of coronary artery disease (status post PCI to LAD 2005), bioprosthetic aortic valve replacement (), mild mitral regurgitation, moderate tricuspid regurgitation, diabetes, a dyslipidemia, hypertension, peripheral vascular disease, and moderate carotid atherosclerosis admitted for anemia 2/2 to hematuria due to complications from catheter, now with rapid a fib with rvr.    1. Rapid A fib with rvr  - non sustained, patient rate fluctuates 88-130s  - cardiologist is Dr. Moreland   - likely 2/2 to increased demand in the setting of anemia   - will give toprol xl 50 mg X 1  - patient is on eliquis at home, however temporarily paused prior to and after urologic procedure as per patient   - restart amiodarone 200 mg daily (home dose)  - would recommend cardiology consult Called by RN for a fib with rvr to 130s. Patient receiving 1 pRBCs for anemia 2/2 to hematuria and recent urologic procedure. patient admitted for cbi, passing blood clots. patient admits to history of a fib. cardio mikel murphy.   uponr reviewing [facility] records. patient was prescribe metoprolol 50 mg XL, amiodarone 200 mg daily. patient admits he takes amiodarone daily however does not take the toprol.   He currently denies any chest pain, palpitations, sob or dizziness.     EKG: rapid a fib with  bpm   T(C): 37.2 (10-12-21 @ 00:45), Max: 37.3 (10-11-21 @ 22:32)  HR: 112 (10-12-21 @ 00:45) (98 - 112)  BP: 108/53 (10-12-21 @ 00:45) (102/58 - 117/60)  RR: 16 (10-12-21 @ 00:45) (16 - 18)  SpO2: 100% (10-12-21 @ 00:45) (98% - 100%)  Wt(kg): --    Physical :  Gen- NAD, ncat  Cardio - s+1,s+2, + irregularly irregular tachycardic,   Lung - cta b/l, no wheeze, no rhonchi, no rales   Abdomen- +BS, NT/ND, no guarding, no rebound, no masses  Ext- no edema, 2+ pulses b/l  Neuro- aao X 4, moving all extremities, conversing appropiately.     LABS:                        7.1    14.09 )-----------( 295      ( 11 Oct 2021 18:27 )             22.0     10-11    130<L>  |  94<L>  |  30<H>  ----------------------------<  422<H>  4.4   |  26  |  2.00<H>    Ca    8.5      11 Oct 2021 18:27    TPro  7.2  /  Alb  2.7<L>  /  TBili  0.8  /  DBili  x   /  AST  14<L>  /  ALT  21  /  AlkPhos  175<H>  10-11    PT/INR - ( 11 Oct 2021 18:27 )   PT: 16.6 sec;   INR: 1.44 ratio         PTT - ( 11 Oct 2021 18:27 )  PTT:28.5 sec  Urinalysis Basic - ( 11 Oct 2021 18:27 )    Color: Red / Appearance: bloody / S.015 / pH: x  Gluc: x / Ketone: Trace  / Bili: Negative / Urobili: Negative   Blood: x / Protein: 500 mg/dL / Nitrite: Positive   Leuk Esterase: Trace / RBC: >50 /HPF / WBC 11-25   Sq Epi: x / Non Sq Epi: Occasional / Bacteria: Occasional        Assessment/Plan  86-year-old male with a history of coronary artery disease (status post PCI to LAD 2005), bioprosthetic aortic valve replacement (), mild mitral regurgitation, moderate tricuspid regurgitation, diabetes, a dyslipidemia, hypertension, peripheral vascular disease, and moderate carotid atherosclerosis admitted for anemia 2/2 to hematuria due to complications from catheter, now with rapid a fib with rvr.    1. Rapid A fib with rvr  - non sustained, patient rate fluctuates 88-130s  - cardiologist is Dr. Miranda   - likely 2/2 to increased demand in the setting of anemia   - will give toprol xl 50 mg X 1  - patient is on eliquis at home, however temporarily paused prior to and after urologic procedure as per patient   - restart amiodarone 200 mg daily (home dose)  - would recommend cardiology consult

## 2021-10-12 NOTE — ED ADULT NURSE REASSESSMENT NOTE - NS ED NURSE REASSESS COMMENT FT1
Pt c/o severe bladder spasm, CBI not running. Attempted to flush catheter w/o success. Urinary catheter changed #22 fr 3 way cath reinserted, draining well. CBI open wide. Pt also noted to be in afib w/ RVR. House doctor Dr. Gonzales notified and responded, pt medicated per MD order.

## 2021-10-12 NOTE — H&P ADULT - PROBLEM SELECTOR PLAN 1
Admit  Continue CBI  Monitor H/H  Transfuse prn   consult  Further work-up/management pending clinical course.

## 2021-10-12 NOTE — CONSULT NOTE ADULT - SUBJECTIVE AND OBJECTIVE BOX
Patient is a 86y old  Male who presents with a chief complaint of     Reason For Consult: dm2 uncontrolled    HPI:      PAST MEDICAL & SURGICAL HISTORY:  Diabetes mellitus    History of aortic valve replacement    CAD (coronary artery disease)  cardiac stent        FAMILY HISTORY:  No pertinent family history in first degree relatives          Social History:    MEDICATIONS  (STANDING):  aMIOdarone    Tablet 200 milliGRAM(s) Oral daily  cefTRIAXone   IVPB      cefTRIAXone   IVPB 1000 milliGRAM(s) IV Intermittent every 24 hours  dextrose 40% Gel 15 Gram(s) Oral once  dextrose 5%. 1000 milliLiter(s) (50 mL/Hr) IV Continuous <Continuous>  dextrose 5%. 1000 milliLiter(s) (100 mL/Hr) IV Continuous <Continuous>  dextrose 50% Injectable 25 Gram(s) IV Push once  dextrose 50% Injectable 12.5 Gram(s) IV Push once  dextrose 50% Injectable 25 Gram(s) IV Push once  glucagon  Injectable 1 milliGRAM(s) IntraMuscular once  insulin lispro (ADMELOG) corrective regimen sliding scale   SubCutaneous three times a day before meals  insulin lispro (ADMELOG) corrective regimen sliding scale   SubCutaneous at bedtime  sodium chloride 0.9%. 1000 milliLiter(s) (100 mL/Hr) IV Continuous <Continuous>    MEDICATIONS  (PRN):  acetaminophen   Tablet .. 650 milliGRAM(s) Oral every 6 hours PRN Temp greater or equal to 38C (100.4F), Mild Pain (1 - 3)  aluminum hydroxide/magnesium hydroxide/simethicone Suspension 30 milliLiter(s) Oral every 4 hours PRN Dyspepsia  melatonin 3 milliGRAM(s) Oral at bedtime PRN Insomnia  ondansetron Injectable 4 milliGRAM(s) IV Push every 8 hours PRN Nausea and/or Vomiting        T(C): 36.6 (10-12-21 @ 07:59), Max: 37.3 (10-11-21 @ 22:32)  HR: 110 (10-12-21 @ 07:59) (98 - 116)  BP: 105/68 (10-12-21 @ 07:59) (102/58 - 122/56)  RR: 14 (10-12-21 @ 07:00) (14 - 18)  SpO2: 98% (10-12-21 @ 07:59) (97% - 100%)  Wt(kg): --    PHYSICAL EXAM:  CHEST/LUNG: Clear to percussion bilaterally; No rales, rhonchi, wheezing, or rubs  HEART: Regular rate and rhythm; No murmurs, rubs, or gallops  ABDOMEN: Soft, Nontender, Nondistended; Bowel sounds present  EXTREMITIES:  2+ Peripheral Pulses, No clubbing, cyanosis, or edema  SKIN: No rashes or lesions    CAPILLARY BLOOD GLUCOSE      POCT Blood Glucose.: 414 mg/dL (12 Oct 2021 07:21)  POCT Blood Glucose.: 387 mg/dL (11 Oct 2021 22:04)  POCT Blood Glucose.: 438 mg/dL (11 Oct 2021 20:00)                            7.0    15.08 )-----------( 271      ( 12 Oct 2021 04:55 )             20.9       CMP:  10-12 @ 04:56  SGPT --  Albumin --   Alk Phos --   Anion Gap 10   SGOT --   Total Bili --   BUN 34   Calcium Total 8.5   CO2 25   Chloride 96   Creatinine 2.00   eGFR if AA 34   eGFR if non AA 30   Glucose 334   Potassium 4.3   Protein --   Sodium 131      Thyroid Function Tests:      Diabetes Tests:       Radiology:

## 2021-10-12 NOTE — CONSULT NOTE ADULT - SUBJECTIVE AND OBJECTIVE BOX
CHARTING IN PROGRESS  Patient is a 86y old  Male who presents with a chief complaint of     HPI: 87 yo male with PMH afib (on Eliquis), BPH, CAD (s/p stent ), Aortic valve replacement () presents to ED hematuria found to be in Afib with RVR. Patient denies chest pain, palpitations, shortness of breath, orthopnea. Admits to LE edema for past year         PAST MEDICAL & SURGICAL HISTORY:  Diabetes mellitus    History of aortic valve replacement    CAD (coronary artery disease)  cardiac stent        ECHO FINDINGS:    MEDICATIONS  (STANDING):  aMIOdarone    Tablet 200 milliGRAM(s) Oral daily  cefTRIAXone   IVPB      cefTRIAXone   IVPB 1000 milliGRAM(s) IV Intermittent every 24 hours  dextrose 40% Gel 15 Gram(s) Oral once  dextrose 5%. 1000 milliLiter(s) (50 mL/Hr) IV Continuous <Continuous>  dextrose 5%. 1000 milliLiter(s) (100 mL/Hr) IV Continuous <Continuous>  dextrose 50% Injectable 25 Gram(s) IV Push once  dextrose 50% Injectable 12.5 Gram(s) IV Push once  dextrose 50% Injectable 25 Gram(s) IV Push once  glucagon  Injectable 1 milliGRAM(s) IntraMuscular once  insulin lispro (ADMELOG) corrective regimen sliding scale   SubCutaneous three times a day before meals  insulin lispro (ADMELOG) corrective regimen sliding scale   SubCutaneous at bedtime  sodium chloride 0.9%. 1000 milliLiter(s) (100 mL/Hr) IV Continuous <Continuous>    MEDICATIONS  (PRN):  acetaminophen   Tablet .. 650 milliGRAM(s) Oral every 6 hours PRN Temp greater or equal to 38C (100.4F), Mild Pain (1 - 3)  aluminum hydroxide/magnesium hydroxide/simethicone Suspension 30 milliLiter(s) Oral every 4 hours PRN Dyspepsia  melatonin 3 milliGRAM(s) Oral at bedtime PRN Insomnia  ondansetron Injectable 4 milliGRAM(s) IV Push every 8 hours PRN Nausea and/or Vomiting      FAMILY HISTORY:  No pertinent family history in first degree relatives    Denies Family history of CAD or early MI    Constitutional: denies fever, chills  HEENT: denies blurry vision, difficulty hearing  Respiratory: denies SOB, DE [place] GARZA, cough  Cardiovascular: denies CP, palpitations, orthopnea, PND, LE edema  Gastrointestinal: denies nausea, vomiting, abdominal pain  Genitourinary: denies urinary changes  Skin: Denies rashes, itching  Neurologic: denies headache, weakness, dizziness  Hematology/Oncology: denies bleeding, easy bruising    SOCIAL HISTORY: No tobacco, Alcohol or Drug use    Vital Signs Last 24 Hrs  T(C): 36.6 (12 Oct 2021 07:59), Max: 37.3 (11 Oct 2021 22:32)  T(F): 97.9 (12 Oct 2021 07:59), Max: 99.2 (11 Oct 2021 22:32)  HR: 110 (12 Oct 2021 07:59) (98 - 116)  BP: 105/68 (12 Oct 2021 07:59) (102/58 - 122/56)  BP(mean): --  RR: 14 (12 Oct 2021 07:00) (14 - 18)  SpO2: 98% (12 Oct 2021 07:59) (97% - 100%)    Physical Exam:  General: Well developed, well nourished, NAD  HEENT: NCAT, EOMI bl, moist mucous membranes   Neck: Supple, nontender, no mass  Neurology: A&Ox3, nonfocal, sensation intact   Respiratory: CTA B/L, No W/R/R  CV: RRR, +S1/S2, no murmurs, rubs or gallops  Abdominal: Soft, NT, ND +BSx4, no palpable masses  Extremities: No C/C/E, + peripheral pulses  MSK: Normal ROM, no joint erythema or warmth, no joint swelling   Heme: No obvious ecchymosis or petechiae   Skin: warm, dry, normal color    ECG:    I&O's Detail      LABS:                        7.0    15.08 )-----------( 271      ( 12 Oct 2021 04:55 )             20.9     10-12    131<L>  |  96  |  34<H>  ----------------------------<  334<H>  4.3   |  25  |  2.00<H>    Ca    8.5      12 Oct 2021 04:56  Phos  4.2     10-12  Mg     2.1     10-12    TPro  7.2  /  Alb  2.7<L>  /  TBili  0.8  /  DBili  x   /  AST  14<L>  /  ALT  21  /  AlkPhos  175<H>  10-11    CARDIAC MARKERS ( 12 Oct 2021 04:56 )  .186 ng/mL / x     / 49 U/L / x     / 3.8 ng/mL      PT/INR - ( 11 Oct 2021 18:27 )   PT: 16.6 sec;   INR: 1.44 ratio         PTT - ( 11 Oct 2021 18:27 )  PTT:28.5 sec  Urinalysis Basic - ( 11 Oct 2021 18:27 )    Color: Red / Appearance: bloody / S.015 / pH: x  Gluc: x / Ketone: Trace  / Bili: Negative / Urobili: Negative   Blood: x / Protein: 500 mg/dL / Nitrite: Positive   Leuk Esterase: Trace / RBC: >50 /HPF / WBC 11-25   Sq Epi: x / Non Sq Epi: Occasional / Bacteria: Occasional      I&O's Summary    BNP  RADIOLOGY & ADDITIONAL STUDIES: CHARTING IN PROGRESS  Patient is a 86y old  Male who presents with a chief complaint of     HPI: 85 yo male with PMH afib (on Eliquis, s/p BILLY cardioversion 2021), BPH, CAD (s/p stent ), Aortic valve replacement () presents to ED hematuria found to be in Afib with RVR. Patient denies chest pain, palpitations, shortness of breath, orthopnea. Admits to LE edema for past year for which he has been given lasix. Patient reports missing his medication for past 1.5 days because he was not feeling well and dealing with his prostate/urology issues. Patient states his eliquis was held for 5 days prior to his urology procedure (?TURP) on 10/1/21.         PAST MEDICAL & SURGICAL HISTORY:  Diabetes mellitus    History of aortic valve replacement    CAD (coronary artery disease)  cardiac stent        ECHO FINDINGS:    MEDICATIONS  (STANDING):  aMIOdarone    Tablet 200 milliGRAM(s) Oral daily  cefTRIAXone   IVPB      cefTRIAXone   IVPB 1000 milliGRAM(s) IV Intermittent every 24 hours  dextrose 40% Gel 15 Gram(s) Oral once  dextrose 5%. 1000 milliLiter(s) (50 mL/Hr) IV Continuous <Continuous>  dextrose 5%. 1000 milliLiter(s) (100 mL/Hr) IV Continuous <Continuous>  dextrose 50% Injectable 25 Gram(s) IV Push once  dextrose 50% Injectable 12.5 Gram(s) IV Push once  dextrose 50% Injectable 25 Gram(s) IV Push once  glucagon  Injectable 1 milliGRAM(s) IntraMuscular once  insulin lispro (ADMELOG) corrective regimen sliding scale   SubCutaneous three times a day before meals  insulin lispro (ADMELOG) corrective regimen sliding scale   SubCutaneous at bedtime  sodium chloride 0.9%. 1000 milliLiter(s) (100 mL/Hr) IV Continuous <Continuous>    MEDICATIONS  (PRN):  acetaminophen   Tablet .. 650 milliGRAM(s) Oral every 6 hours PRN Temp greater or equal to 38C (100.4F), Mild Pain (1 - 3)  aluminum hydroxide/magnesium hydroxide/simethicone Suspension 30 milliLiter(s) Oral every 4 hours PRN Dyspepsia  melatonin 3 milliGRAM(s) Oral at bedtime PRN Insomnia  ondansetron Injectable 4 milliGRAM(s) IV Push every 8 hours PRN Nausea and/or Vomiting      FAMILY HISTORY:  No pertinent family history in first degree relatives    Denies Family history of CAD or early MI    Constitutional: denies fever, chills  HEENT: denies blurry vision, difficulty hearing  Respiratory: denies SOB, [place] GARZA, cough  Cardiovascular: denies CP, palpitations, orthopnea, PND, LE edema  Gastrointestinal: denies nausea, vomiting, abdominal pain  Genitourinary: denies urinary changes  Skin: Denies rashes, itching  Neurologic: denies headache, weakness, dizziness  Hematology/Oncology: denies bleeding, easy bruising    SOCIAL HISTORY: No tobacco, Alcohol or Drug use    Vital Signs Last 24 Hrs  T(C): 36.6 (12 Oct 2021 07:59), Max: 37.3 (11 Oct 2021 22:32)  T(F): 97.9 (12 Oct 2021 07:59), Max: 99.2 (11 Oct 2021 22:32)  HR: 110 (12 Oct 2021 07:59) (98 - 116)  BP: 105/68 (12 Oct 2021 07:59) (102/58 - 122/56)  BP(mean): --  RR: 14 (12 Oct 2021 07:00) (14 - 18)  SpO2: 98% (12 Oct 2021 07:59) (97% - 100%)    Physical Exam:  General: Well developed, well nourished, NAD  HEENT: NCAT, EOMI bl, moist mucous membranes   Neck: Supple, nontender, no mass  Neurology: A&Ox3, nonfocal, sensation intact   Respiratory: CTA B/L, No W/R/R  CV: RRR, +S1/S2, no murmurs, rubs or gallops  Abdominal: Soft, NT, ND +BSx4, no palpable masses  Extremities: No C/C/E, + peripheral pulses  MSK: Normal ROM, no joint erythema or warmth, no joint swelling   Heme: No obvious ecchymosis or petechiae   Skin: warm, dry, normal color    ECG: Afib with RVR    I&O's Detail      LABS:                        7.0    15.08 )-----------( 271      ( 12 Oct 2021 04:55 )             20.9     10-12    131<L>  |  96  |  34<H>  ----------------------------<  334<H>  4.3   |  25  |  2.00<H>    Ca    8.5      12 Oct 2021 04:56  Phos  4.2     10-12  Mg     2.1     10-12    TPro  7.2  /  Alb  2.7<L>  /  TBili  0.8  /  DBili  x   /  AST  14<L>  /  ALT  21  /  AlkPhos  175<H>  10-11    CARDIAC MARKERS ( 12 Oct 2021 04:56 )  .186 ng/mL / x     / 49 U/L / x     / 3.8 ng/mL      PT/INR - ( 11 Oct 2021 18:27 )   PT: 16.6 sec;   INR: 1.44 ratio         PTT - ( 11 Oct 2021 18:27 )  PTT:28.5 sec  Urinalysis Basic - ( 11 Oct 2021 18:27 )    Color: Red / Appearance: bloody / S.015 / pH: x  Gluc: x / Ketone: Trace  / Bili: Negative / Urobili: Negative   Blood: x / Protein: 500 mg/dL / Nitrite: Positive   Leuk Esterase: Trace / RBC: >50 /HPF / WBC 11-25   Sq Epi: x / Non Sq Epi: Occasional / Bacteria: Occasional      I&O's Summary    BNP  RADIOLOGY & ADDITIONAL STUDIES: Patient is a 86y old  Male who presents with a chief complaint of     HPI: 85 yo male with PMH afib (on Eliquis, s/p BILLY cardioversion 2021), BPH, CAD (s/p stent ), Aortic valve replacement () presents to ED hematuria found to be in Afib with RVR. Patient denies chest pain, palpitations, shortness of breath, orthopnea. Admits to LE edema for past year for which he has been given lasix. Patient reports missing his medication for past 1.5 days because he was not feeling well and dealing with his prostate/urology issues. Patient states his eliquis was held for 5 days prior to his urology procedure (?TURP) on 10/1/21.         PAST MEDICAL & SURGICAL HISTORY:  Diabetes mellitus    History of aortic valve replacement    CAD (coronary artery disease)  cardiac stent        ECHO FINDINGS:    MEDICATIONS  (STANDING):  aMIOdarone    Tablet 200 milliGRAM(s) Oral daily  cefTRIAXone   IVPB      cefTRIAXone   IVPB 1000 milliGRAM(s) IV Intermittent every 24 hours  dextrose 40% Gel 15 Gram(s) Oral once  dextrose 5%. 1000 milliLiter(s) (50 mL/Hr) IV Continuous <Continuous>  dextrose 5%. 1000 milliLiter(s) (100 mL/Hr) IV Continuous <Continuous>  dextrose 50% Injectable 25 Gram(s) IV Push once  dextrose 50% Injectable 12.5 Gram(s) IV Push once  dextrose 50% Injectable 25 Gram(s) IV Push once  glucagon  Injectable 1 milliGRAM(s) IntraMuscular once  insulin lispro (ADMELOG) corrective regimen sliding scale   SubCutaneous three times a day before meals  insulin lispro (ADMELOG) corrective regimen sliding scale   SubCutaneous at bedtime  sodium chloride 0.9%. 1000 milliLiter(s) (100 mL/Hr) IV Continuous <Continuous>    MEDICATIONS  (PRN):  acetaminophen   Tablet .. 650 milliGRAM(s) Oral every 6 hours PRN Temp greater or equal to 38C (100.4F), Mild Pain (1 - 3)  aluminum hydroxide/magnesium hydroxide/simethicone Suspension 30 milliLiter(s) Oral every 4 hours PRN Dyspepsia  melatonin 3 milliGRAM(s) Oral at bedtime PRN Insomnia  ondansetron Injectable 4 milliGRAM(s) IV Push every 8 hours PRN Nausea and/or Vomiting      FAMILY HISTORY:  No pertinent family history in first degree relatives    Denies Family history of CAD or early MI    Constitutional: denies fever, chills  HEENT: denies blurry vision, difficulty hearing  Respiratory: denies SOB, [place] G[place], cough  Cardiovascular: denies CP, palpitations, orthopnea, PND, LE edema  Gastrointestinal: denies nausea, vomiting, abdominal pain  Genitourinary: denies urinary changes  Skin: Denies rashes, itching  Neurologic: denies headache, weakness, dizziness  Hematology/Oncology: denies bleeding, easy bruising    SOCIAL HISTORY: No tobacco, Alcohol or Drug use    Vital Signs Last 24 Hrs  T(C): 36.6 (12 Oct 2021 07:59), Max: 37.3 (11 Oct 2021 22:32)  T(F): 97.9 (12 Oct 2021 07:59), Max: 99.2 (11 Oct 2021 22:32)  HR: 110 (12 Oct 2021 07:59) (98 - 116)  BP: 105/68 (12 Oct 2021 07:59) (102/58 - 122/56)  BP(mean): --  RR: 14 (12 Oct 2021 07:00) (14 - 18)  SpO2: 98% (12 Oct 2021 07:59) (97% - 100%)    Physical Exam:  General: Well developed, well nourished, NAD  HEENT: NCAT, EOMI bl, moist mucous membranes   Neck: Supple, nontender, no mass  Neurology: A&Ox3, nonfocal, sensation intact   Respiratory: CTA B/L, No W/R/R  CV: RRR, +S1/S2, no murmurs, rubs or gallops  Abdominal: Soft, NT, ND +BSx4, no palpable masses  Extremities: No C/C/E, + peripheral pulses  MSK: Normal ROM, no joint erythema or warmth, no joint swelling   Heme: No obvious ecchymosis or petechiae   Skin: warm, dry, normal color    ECG: Afib with RVR    I&O's Detail      LABS:                        7.0    15.08 )-----------( 271      ( 12 Oct 2021 04:55 )             20.9     10-12    131<L>  |  96  |  34<H>  ----------------------------<  334<H>  4.3   |  25  |  2.00<H>    Ca    8.5      12 Oct 2021 04:56  Phos  4.2     10-12  Mg     2.1     10-12    TPro  7.2  /  Alb  2.7<L>  /  TBili  0.8  /  DBili  x   /  AST  14<L>  /  ALT  21  /  AlkPhos  175<H>  10-11    CARDIAC MARKERS ( 12 Oct 2021 04:56 )  .186 ng/mL / x     / 49 U/L / x     / 3.8 ng/mL      PT/INR - ( 11 Oct 2021 18:27 )   PT: 16.6 sec;   INR: 1.44 ratio         PTT - ( 11 Oct 2021 18:27 )  PTT:28.5 sec  Urinalysis Basic - ( 11 Oct 2021 18:27 )    Color: Red / Appearance: bloody / S.015 / pH: x  Gluc: x / Ketone: Trace  / Bili: Negative / Urobili: Negative   Blood: x / Protein: 500 mg/dL / Nitrite: Positive   Leuk Esterase: Trace / RBC: >50 /HPF / WBC 11-25   Sq Epi: x / Non Sq Epi: Occasional / Bacteria: Occasional      I&O's Summary    BNP  RADIOLOGY & ADDITIONAL STUDIES:

## 2021-10-12 NOTE — CONSULT NOTE ADULT - SUBJECTIVE AND OBJECTIVE BOX
CHIEF COMPLAINT: hematuria, retention    HISTORY OF PRESENT ILLNESS: 85 y/o man s/p TURP 10 days PTA developed gross hematuria via sherman 10/11 and was seen by his , where cath was irrigated. Last night he was  admitted because the sherman was not draining and his cath was changed, irrigated a few times. He was found to be anemic as well.    PAST MEDICAL & SURGICAL HISTORY:  Diabetes mellitus    History of aortic valve replacement    CAD (coronary artery disease)  cardiac stent        REVIEW OF SYSTEMS:    CONSTITUTIONAL: No weakness, fevers or chills  EYES/ENT: No visual changes;  No vertigo or throat pain   NECK: No pain or stiffness  RESPIRATORY: No cough, wheezing, hemoptysis; No shortness of breath  CARDIOVASCULAR: No chest pain or palpitations  GASTROINTESTINAL: No abdominal or epigastric pain. No nausea, vomiting, or hematemesis; No diarrhea or constipation. No melena or hematochezia.  GENITOURINARY: see hpi  NEUROLOGICAL: No numbness or weakness  SKIN: No itching, burning, rashes, or lesions   All other review of systems is negative unless indicated above.    MEDICATIONS  (STANDING):  aMIOdarone    Tablet 200 milliGRAM(s) Oral daily  cefTRIAXone   IVPB      cefTRIAXone   IVPB 1000 milliGRAM(s) IV Intermittent every 24 hours  dextrose 40% Gel 15 Gram(s) Oral once  dextrose 5%. 1000 milliLiter(s) (50 mL/Hr) IV Continuous <Continuous>  dextrose 5%. 1000 milliLiter(s) (100 mL/Hr) IV Continuous <Continuous>  dextrose 50% Injectable 25 Gram(s) IV Push once  dextrose 50% Injectable 12.5 Gram(s) IV Push once  dextrose 50% Injectable 25 Gram(s) IV Push once  glucagon  Injectable 1 milliGRAM(s) IntraMuscular once  insulin lispro (ADMELOG) corrective regimen sliding scale   SubCutaneous three times a day before meals  insulin lispro (ADMELOG) corrective regimen sliding scale   SubCutaneous at bedtime  sodium chloride 0.9%. 1000 milliLiter(s) (100 mL/Hr) IV Continuous <Continuous>    MEDICATIONS  (PRN):  acetaminophen   Tablet .. 650 milliGRAM(s) Oral every 6 hours PRN Temp greater or equal to 38C (100.4F), Mild Pain (1 - 3)  aluminum hydroxide/magnesium hydroxide/simethicone Suspension 30 milliLiter(s) Oral every 4 hours PRN Dyspepsia  melatonin 3 milliGRAM(s) Oral at bedtime PRN Insomnia  ondansetron Injectable 4 milliGRAM(s) IV Push every 8 hours PRN Nausea and/or Vomiting      Allergies    No Known Allergies    Intolerances        SOCIAL HISTORY:    FAMILY HISTORY:  No pertinent family history in first degree relatives        Vital Signs Last 24 Hrs  T(C): 36.6 (12 Oct 2021 07:59), Max: 37.3 (11 Oct 2021 22:32)  T(F): 97.9 (12 Oct 2021 07:59), Max: 99.2 (11 Oct 2021 22:32)  HR: 110 (12 Oct 2021 07:59) (98 - 116)  BP: 105/68 (12 Oct 2021 07:59) (102/58 - 122/56)  BP(mean): --  RR: 14 (12 Oct 2021 07:00) (14 - 18)  SpO2: 98% (12 Oct 2021 07:59) (97% - 100%)    PHYSICAL EXAM:    Constitutional: NAD, well-developed  HEENT: MICHELLE, EOMI, Normal Hearing, MMM  Neck: No LAD, No JVD  Back: Normal spine flexure, No CVA tenderness  Respiratory: CTAB   Abd: BS+, soft, NT/ND, No CVAT  : Normal phallus,open meatus,bilateral descended testes, no masses, indwellingg sherman draining tiged urine on cbi  Extremities: No peripheral edema  Vascular: 2+ peripheral pulses  Neurological: A/O x 3, no focal deficits  Psychiatric: Normal mood, normal affect  Musculoskeletal: 5/5 strength b/l upper and lower extremities  Skin: No rashes    LABS:                        7.0    15.08 )-----------( 271      ( 12 Oct 2021 04:55 )             20.9     10-12    131<L>  |  96  |  34<H>  ----------------------------<  334<H>  4.3   |  25  |  2.00<H>    Ca    8.5      12 Oct 2021 04:56  Phos  4.2     10-12  Mg     2.1     10-12    TPro  7.2  /  Alb  2.7<L>  /  TBili  0.8  /  DBili  x   /  AST  14<L>  /  ALT  21  /  AlkPhos  175<H>  10-11    PT/INR - ( 11 Oct 2021 18:27 )   PT: 16.6 sec;   INR: 1.44 ratio         PTT - ( 11 Oct 2021 18:27 )  PTT:28.5 sec  Urinalysis Basic - ( 11 Oct 2021 18:27 )    Color: Red / Appearance: bloody / S.015 / pH: x  Gluc: x / Ketone: Trace  / Bili: Negative / Urobili: Negative   Blood: x / Protein: 500 mg/dL / Nitrite: Positive   Leuk Esterase: Trace / RBC: >50 /HPF / WBC 11-25   Sq Epi: x / Non Sq Epi: Occasional / Bacteria: Occasional      Urine Culture:     RADIOLOGY & ADDITIONAL STUDIES:

## 2021-10-12 NOTE — H&P ADULT - PROBLEM SELECTOR PLAN 4
Restart home meds as tolerated  Hold eliquis due to hematuria  Monitor on tele  CE flat  Cardio consult

## 2021-10-12 NOTE — CONSULT NOTE ADULT - ASSESSMENT
87 yo male with PMH afib (on Eliquis, s/p BILLY cardioversion 7/2021), BPH, CAD (s/p stent 2005), Aortic valve replacement (2001) presents to ED hematuria found to be in Afib with RVR.     Chronic Afib  - S/p BILLY cardioversion 7/2021  - On eliqius however held 5 days prior to his urology procedure (?TURP) on 10/1/21  - Pt appears mildly overloaded on exam with LE edema however would hold off on diuresis in the setting of RANDOLPH  - Transfuse PRBC PRN to keep Hgb >8 given cardiac hx   - Likely 2/2 missed dose of home meds. Would resume home rate control meds as prescribed however only restart Eliquis when appropriate by urology    Elevated trops  - trops 0.186. CKs are flat, suggesting against acute atherosclerotic plaque rupture.  - Patient is not complaining of any cardiac symptoms at this time.  - EKG showing mild ST wave depressions  - Mild demand ischemia likely in the setting of acute stress vs anemia  - Biomarker trend is not consistent with plaque rupture but rather demand ischemia likely 2/2 acute stress vs anemia. Monitor closely for the development of anginal symptoms or clinical signs of ischemia.   - Would trend trops to peak     Aortic valve replacement ~20  years ago   - Last echo 7/2021 showing EF 60-65%  - Appears stable      - Other cardiovascular workup will depend on clinical course.  - All other workup per primary team.  - Will continue to follow.

## 2021-10-13 LAB
ALBUMIN SERPL ELPH-MCNC: 2.4 G/DL — LOW (ref 3.3–5)
ALP SERPL-CCNC: 137 U/L — HIGH (ref 40–120)
ALT FLD-CCNC: 22 U/L — SIGNIFICANT CHANGE UP (ref 12–78)
ANION GAP SERPL CALC-SCNC: 11 MMOL/L — SIGNIFICANT CHANGE UP (ref 5–17)
AST SERPL-CCNC: 22 U/L — SIGNIFICANT CHANGE UP (ref 15–37)
BILIRUB SERPL-MCNC: 0.7 MG/DL — SIGNIFICANT CHANGE UP (ref 0.2–1.2)
BUN SERPL-MCNC: 34 MG/DL — HIGH (ref 7–23)
CALCIUM SERPL-MCNC: 8.7 MG/DL — SIGNIFICANT CHANGE UP (ref 8.5–10.1)
CHLORIDE SERPL-SCNC: 98 MMOL/L — SIGNIFICANT CHANGE UP (ref 96–108)
CK MB BLD-MCNC: 9.3 % — HIGH (ref 0–3.5)
CK MB CFR SERPL CALC: 4.3 NG/ML — HIGH (ref 0–3.6)
CK SERPL-CCNC: 46 U/L — SIGNIFICANT CHANGE UP (ref 26–308)
CO2 SERPL-SCNC: 23 MMOL/L — SIGNIFICANT CHANGE UP (ref 22–31)
COVID-19 SPIKE DOMAIN AB INTERP: POSITIVE
COVID-19 SPIKE DOMAIN ANTIBODY RESULT: 149 U/ML — HIGH
CREAT SERPL-MCNC: 1.6 MG/DL — HIGH (ref 0.5–1.3)
GLUCOSE SERPL-MCNC: 175 MG/DL — HIGH (ref 70–99)
HCT VFR BLD CALC: 24.7 % — LOW (ref 39–50)
HGB BLD-MCNC: 8.1 G/DL — LOW (ref 13–17)
MAGNESIUM SERPL-MCNC: 2.3 MG/DL — SIGNIFICANT CHANGE UP (ref 1.6–2.6)
MCHC RBC-ENTMCNC: 29.1 PG — SIGNIFICANT CHANGE UP (ref 27–34)
MCHC RBC-ENTMCNC: 32.8 GM/DL — SIGNIFICANT CHANGE UP (ref 32–36)
MCV RBC AUTO: 88.8 FL — SIGNIFICANT CHANGE UP (ref 80–100)
NRBC # BLD: 0 /100 WBCS — SIGNIFICANT CHANGE UP (ref 0–0)
PLATELET # BLD AUTO: 311 K/UL — SIGNIFICANT CHANGE UP (ref 150–400)
POTASSIUM SERPL-MCNC: 3.8 MMOL/L — SIGNIFICANT CHANGE UP (ref 3.5–5.3)
POTASSIUM SERPL-SCNC: 3.8 MMOL/L — SIGNIFICANT CHANGE UP (ref 3.5–5.3)
PROT SERPL-MCNC: 6.6 G/DL — SIGNIFICANT CHANGE UP (ref 6–8.3)
RBC # BLD: 2.78 M/UL — LOW (ref 4.2–5.8)
RBC # FLD: 15.9 % — HIGH (ref 10.3–14.5)
SARS-COV-2 IGG+IGM SERPL QL IA: 149 U/ML — HIGH
SARS-COV-2 IGG+IGM SERPL QL IA: POSITIVE
SODIUM SERPL-SCNC: 132 MMOL/L — LOW (ref 135–145)
TROPONIN I SERPL-MCNC: 0.57 NG/ML — HIGH (ref 0.01–0.04)
WBC # BLD: 18.68 K/UL — HIGH (ref 3.8–10.5)
WBC # FLD AUTO: 18.68 K/UL — HIGH (ref 3.8–10.5)

## 2021-10-13 PROCEDURE — 99232 SBSQ HOSP IP/OBS MODERATE 35: CPT

## 2021-10-13 RX ORDER — METOPROLOL TARTRATE 50 MG
12.5 TABLET ORAL EVERY 12 HOURS
Refills: 0 | Status: DISCONTINUED | OUTPATIENT
Start: 2021-10-13 | End: 2021-10-20

## 2021-10-13 RX ORDER — INSULIN GLARGINE 100 [IU]/ML
15 INJECTION, SOLUTION SUBCUTANEOUS AT BEDTIME
Refills: 0 | Status: DISCONTINUED | OUTPATIENT
Start: 2021-10-13 | End: 2021-10-16

## 2021-10-13 RX ORDER — METOPROLOL TARTRATE 50 MG
12.5 TABLET ORAL ONCE
Refills: 0 | Status: COMPLETED | OUTPATIENT
Start: 2021-10-13 | End: 2021-10-13

## 2021-10-13 RX ORDER — POTASSIUM CHLORIDE 20 MEQ
20 PACKET (EA) ORAL ONCE
Refills: 0 | Status: COMPLETED | OUTPATIENT
Start: 2021-10-13 | End: 2021-10-13

## 2021-10-13 RX ADMIN — Medication 20 MILLIEQUIVALENT(S): at 11:16

## 2021-10-13 RX ADMIN — Medication 4: at 22:03

## 2021-10-13 RX ADMIN — CEFTRIAXONE 100 MILLIGRAM(S): 500 INJECTION, POWDER, FOR SOLUTION INTRAMUSCULAR; INTRAVENOUS at 22:03

## 2021-10-13 RX ADMIN — AMIODARONE HYDROCHLORIDE 200 MILLIGRAM(S): 400 TABLET ORAL at 05:00

## 2021-10-13 RX ADMIN — Medication 8: at 17:23

## 2021-10-13 RX ADMIN — Medication 12.5 MILLIGRAM(S): at 12:27

## 2021-10-13 RX ADMIN — Medication 12.5 MILLIGRAM(S): at 17:28

## 2021-10-13 RX ADMIN — INSULIN GLARGINE 15 UNIT(S): 100 INJECTION, SOLUTION SUBCUTANEOUS at 22:04

## 2021-10-13 RX ADMIN — Medication 8: at 12:08

## 2021-10-13 RX ADMIN — Medication 2: at 08:30

## 2021-10-13 NOTE — PROGRESS NOTE ADULT - SUBJECTIVE AND OBJECTIVE BOX
INTERVAL HPI/OVERNIGHT EVENTS: Vinson draining well this AM. Vinson required irrigation overnight.    MEDICATIONS  (STANDING):  aMIOdarone    Tablet 200 milliGRAM(s) Oral daily  cefTRIAXone   IVPB      cefTRIAXone   IVPB 1000 milliGRAM(s) IV Intermittent every 24 hours  dextrose 40% Gel 15 Gram(s) Oral once  dextrose 5%. 1000 milliLiter(s) (50 mL/Hr) IV Continuous <Continuous>  dextrose 5%. 1000 milliLiter(s) (100 mL/Hr) IV Continuous <Continuous>  dextrose 50% Injectable 25 Gram(s) IV Push once  dextrose 50% Injectable 12.5 Gram(s) IV Push once  dextrose 50% Injectable 25 Gram(s) IV Push once  glucagon  Injectable 1 milliGRAM(s) IntraMuscular once  influenza   Vaccine 0.5 milliLiter(s) IntraMuscular once  insulin glargine Injectable (LANTUS) 10 Unit(s) SubCutaneous at bedtime  insulin lispro (ADMELOG) corrective regimen sliding scale   SubCutaneous three times a day before meals  insulin lispro (ADMELOG) corrective regimen sliding scale   SubCutaneous at bedtime  sodium chloride 0.9%. 1000 milliLiter(s) (75 mL/Hr) IV Continuous <Continuous>    MEDICATIONS  (PRN):  acetaminophen   Tablet .. 650 milliGRAM(s) Oral every 6 hours PRN Temp greater or equal to 38C (100.4F), Mild Pain (1 - 3)  aluminum hydroxide/magnesium hydroxide/simethicone Suspension 30 milliLiter(s) Oral every 4 hours PRN Dyspepsia  melatonin 3 milliGRAM(s) Oral at bedtime PRN Insomnia  ondansetron Injectable 4 milliGRAM(s) IV Push every 8 hours PRN Nausea and/or Vomiting        Vital Signs Last 24 Hrs  T(C): 36.4 (13 Oct 2021 05:19), Max: 36.7 (12 Oct 2021 07:00)  T(F): 97.5 (13 Oct 2021 05:19), Max: 98.1 (12 Oct 2021 07:00)  HR: 88 (13 Oct 2021 05:19) (80 - 116)  BP: 104/56 (13 Oct 2021 05:19) (95/58 - 122/56)  BP(mean): --  RR: 18 (13 Oct 2021 05:19) (14 - 18)  SpO2: 92% (13 Oct 2021 05:19) (92% - 100%)    PHYSICAL EXAM:    ABDOMEN: Soft. No SP distention or tenderness  GENITALIA: Vinson in place. Draining well. Urine punch colored on CBI.    LABS:                        8.1    x     )-----------( x        ( 12 Oct 2021 16:28 )             24.3     10-12    131<L>  |  96  |  34<H>  ----------------------------<  334<H>  4.3   |  25  |  2.00<H>    Ca    8.5      12 Oct 2021 04:56  Phos  4.2     10  Mg     2.1     10-12    TPro  7.2  /  Alb  2.7<L>  /  TBili  0.8  /  DBili  x   /  AST  14<L>  /  ALT  21  /  AlkPhos  175<H>  1011    PT/INR - ( 11 Oct 2021 18:27 )   PT: 16.6 sec;   INR: 1.44 ratio         PTT - ( 11 Oct 2021 18:27 )  PTT:28.5 sec  Urinalysis Basic - ( 11 Oct 2021 18:27 )    Color: Red / Appearance: bloody / S.015 / pH: x  Gluc: x / Ketone: Trace  / Bili: Negative / Urobili: Negative   Blood: x / Protein: 500 mg/dL / Nitrite: Positive   Leuk Esterase: Trace / RBC: >50 /HPF / WBC 11-25   Sq Epi: x / Non Sq Epi: Occasional / Bacteria: Occasional      Urine culture:  10-12 @ 05:30 --   No growth to date.  Urine culture:  10-12 @ 00:42 --   <10,000 CFU/mL Normal Urogenital Saida

## 2021-10-13 NOTE — PROGRESS NOTE ADULT - ASSESSMENT
? RANDOLPH Vs subacute: Crea 2.2 07/2021, Crea 1.3 02/2021  Hematuria, s/p recent TURP  A fib  Hypertension  Diabetes    Improving renal indices. IV hydration. Hold diuretics. Monitor blood sugar levels. Insulin coverage as needed. Dietary restriction.   Monitor h/h trend. Transfuse PRN. Monitor BP trend. Titrate BP meds as needed. Salt restriction. Cardiology follow up. Will follow electrolytes and renal function trend.  follow up.

## 2021-10-13 NOTE — PROGRESS NOTE ADULT - SUBJECTIVE AND OBJECTIVE BOX
[facility] -- Ruben Matthews, Betsy, Jose, Rony Dennis Savella  Office # [phone]      Follow Up:  Afib w/RVR    Subjective/Observations: Seen and examined.  Sitting in chair with CBI going, noting hematuria in collection bag.  Pt with feelings of weakness but denies cp, sob or palpitations.  Tele reviewed, NAD.       REVIEW OF SYSTEMS: All other review of systems is negative unless indicated above    PAST MEDICAL & SURGICAL HISTORY:  Diabetes mellitus    History of aortic valve replacement    CAD (coronary artery disease)  cardiac stent        MEDICATIONS  (STANDING):  aMIOdarone    Tablet 200 milliGRAM(s) Oral daily  cefTRIAXone   IVPB      cefTRIAXone   IVPB 1000 milliGRAM(s) IV Intermittent every 24 hours  dextrose 40% Gel 15 Gram(s) Oral once  dextrose 5%. 1000 milliLiter(s) (50 mL/Hr) IV Continuous <Continuous>  dextrose 5%. 1000 milliLiter(s) (100 mL/Hr) IV Continuous <Continuous>  dextrose 50% Injectable 25 Gram(s) IV Push once  dextrose 50% Injectable 12.5 Gram(s) IV Push once  dextrose 50% Injectable 25 Gram(s) IV Push once  glucagon  Injectable 1 milliGRAM(s) IntraMuscular once  influenza   Vaccine 0.5 milliLiter(s) IntraMuscular once  insulin glargine Injectable (LANTUS) 15 Unit(s) SubCutaneous at bedtime  insulin lispro (ADMELOG) corrective regimen sliding scale   SubCutaneous three times a day before meals  insulin lispro (ADMELOG) corrective regimen sliding scale   SubCutaneous at bedtime  sodium chloride 0.9%. 1000 milliLiter(s) (75 mL/Hr) IV Continuous <Continuous>    MEDICATIONS  (PRN):  acetaminophen   Tablet .. 650 milliGRAM(s) Oral every 6 hours PRN Temp greater or equal to 38C (100.4F), Mild Pain (1 - 3)  aluminum hydroxide/magnesium hydroxide/simethicone Suspension 30 milliLiter(s) Oral every 4 hours PRN Dyspepsia  melatonin 3 milliGRAM(s) Oral at bedtime PRN Insomnia  ondansetron Injectable 4 milliGRAM(s) IV Push every 8 hours PRN Nausea and/or Vomiting      Allergies    No Known Allergies    Intolerances            Vital Signs Last 24 Hrs  T(C): 36.4 (13 Oct 2021 05:19), Max: 36.7 (12 Oct 2021 11:00)  T(F): 97.5 (13 Oct 2021 05:19), Max: 98.1 (12 Oct 2021 11:00)  HR: 88 (13 Oct 2021 05:19) (80 - 99)  BP: 104/56 (13 Oct 2021 05:19) (95/58 - 107/67)  BP(mean): --  RR: 18 (13 Oct 2021 05:19) (14 - 18)  SpO2: 92% (13 Oct 2021 05:19) (92% - 100%)    I&O's Summary    12 Oct 2021 07:01  -  13 Oct 2021 07:00  --------------------------------------------------------  IN: 1100 mL / OUT: 0 mL / NET: 1100 mL          PHYSICAL EXAM:  TELE: AF with -116 with frequent NSVT 3-4 beats, PVCs  Constitutional: NAD, awake and alert, well-developed  HEENT: Moist Mucous Membranes, Anicteric  Pulmonary: Non-labored, breath sounds are clear bilaterally, No wheezing, rales or rhonchi  Cardiovascular: Irregular, tachy, S1 and S2, + murmurs, No rubs, gallops or clicks  Gastrointestinal: Bowel Sounds present, soft, nontender.   Genitourinary: CBI ongoing with hematuria noted   Lymph: 2+ peripheral edema. No lymphadenopathy.  Skin: No visible rashes or ulcers.  Psych:  Mood & affect appropriate    LABS: All Labs Reviewed:                        8.1    18.68 )-----------( 311      ( 13 Oct 2021 07:57 )             24.7                         8.1    x     )-----------( x        ( 12 Oct 2021 16:28 )             24.3                         7.0    15.08 )-----------( 271      ( 12 Oct 2021 04:55 )             20.9     13 Oct 2021 07:57    132    |  98     |  34     ----------------------------<  175    3.8     |  23     |  1.60   12 Oct 2021 04:56    131    |  96     |  34     ----------------------------<  334    4.3     |  25     |  2.00   11 Oct 2021 18:27    130    |  94     |  30     ----------------------------<  422    4.4     |  26     |  2.00     Ca    8.7        13 Oct 2021 07:57  Ca    8.5        12 Oct 2021 04:56  Ca    8.5        11 Oct 2021 18:27  Phos  4.2       12 Oct 2021 04:56  Mg     2.3       13 Oct 2021 07:57  Mg     2.1       12 Oct 2021 04:56    TPro  6.6    /  Alb  2.4    /  TBili  0.7    /  DBili  x      /  AST  22     /  ALT  22     /  AlkPhos  137    13 Oct 2021 07:57  TPro  7.2    /  Alb  2.7    /  TBili  0.8    /  DBili  x      /  AST  14     /  ALT  21     /  AlkPhos  175    11 Oct 2021 18:27    PT/INR - ( 11 Oct 2021 18:27 )   PT: 16.6 sec;   INR: 1.44 ratio         PTT - ( 11 Oct 2021 18:27 )  PTT:28.5 sec  CARDIAC MARKERS ( 12 Oct 2021 04:56 )  .186 ng/mL / x     / 49 U/L / x     / 3.8 ng/mL    < from: 12 Lead ECG (10.12.21 @ 11:08) >  Ventricular Rate 99 BPM    Atrial Rate 267 BPM    QRS Duration 104 ms    Q-T Interval 376 ms    QTC Calculation(Bazett) 482 ms    R Axis -54 degrees    T Axis 126 degrees    Diagnosis Line Atrial fibrillation  Left axis deviation  Inferior infarct , age undetermined  Anterior infarct (cited on or before 21-FEB-2021)  Abnormal ECG  When compared with ECG of 12-OCT-2021 03:46, (Unconfirmed)  QRS axis shifted left  Inferior infarct is now present  T wave inversion no longer evident in Inferior leads  Confirmed by William Garcia MD ([id number]) on 10/12/2021 2:02:07 PM    < end of copied text >      < from: Transesophageal Echocardiogram (07.09.21 @ 10:21) >    Patient name: VY SHETH  YOB: 1935   Age: 86 (M)   MR#: [id number]  Study Date: 7/9/2021  Location: O/PSonographer: Bridget Callahan [address] Sonographer: Yovanny Gillis M.D.  Study quality: Technically fair  Referring Physician: Demetrius Walker MD  Blood Pressure: 136/67 mmHg  Height: 170 cm  Weight: 84 kg  BSA: 2 m2  ------------------------------------------------------------------------  PROCEDURE: Transesophageal and transthoracic  echocardiograms with 2-D, M-Mode and complete spectral and  color flow Doppler were performed.  Informed consent was  first obtained for BILLY. The patient was sedated - see  anesthesia record.  The procedure was monitored with  automatic blood pressure monitoring, ECG tracings and pulse  oximetry.  The transesophageal probe was placed in the  esophagus posterior to the heart without complications.  INDICATION: Unspecified atrial fibrillation (I48.91)  ------------------------------------------------------------------------  Dimensions:    Normal Values:  LA:     4.7    2.0 - 4.0 cm  Ao:     3.4    2.0 - 3.8 cm  SEPTUM: 1.6    0.6 - 1.2 cm  PWT:    1.4    0.6 - 1.1 cm  LVIDd:  4.4    3.0 - 5.6 cm  LVIDs:  2.9    1.8 - 4.0 cm  Derived variables:  LVMI: 136 g/m2  RWT: 0.63  Fractional short: 34 %  EF (Visual Estimate): 60-65 %  Doppler Peak Velocity (m/sec): AoV=1.7  ------------------------------------------------------------------------  Observations:  Mitral Valve: Mitral annular calcification, otherwise  normal mitral valve. Mild mitral regurgitation.  Aortic Valve/Aorta: Bioprosthetic aortic valve. Peak  transaortic valve gradient equals 12 mm Hg, mean  transaortic valve gradient equals 6 mm Hg, which is  probably normal in the presence of a bioprosthetic aortic  valve. Mild aortic regurgitation.  Peak left ventricular  outflow tract gradient equals 7 mm Hg, LVOT velocity time  integral equals 29 cm.  Aortic Root: 3.5 cm.  LVOT diameter: 2.2 cm.  Complex atheroma noted in aortic arch/descending aorta.  Left Atrium: Moderately dilated left atrium.  LA volume  index = 43 cc/m2.   Spontaneous echo contrast seen.   No  left atrial or left atrial appendage thrombus.   Decreased  left atrial appendage velocities noted.  Left Ventricle: Normal left ventricular systolic function.  No segmental wall motion abnormalities. Moderate concentric  left ventricular hypertrophy.  Right Heart: Severe right atrial enlargement. Right  ventricular enlargement with normal right ventricular  systolic function. Normal tricuspid valve. Severe tricuspid  regurgitation. Normal pulmonic valve.  Pericardium/Pleura: Normal pericardium with no pericardial  effusion.  Hemodynamic: Estimated right atrial pressure is 8 mm Hg.  Estimated right ventricular systolic pressure equals 44 mm  Hg, assuming right atrial pressure equals 8 mm Hg,  consistent with mild pulmonary hypertension. Agitated  saline injection and color flow Doppler demonstrates no  evidence of a patent foramen ovale.  ------------------------------------------------------------------------  Conclusions:  1. Bioprosthetic aortic valve. Peak transaortic valve  gradient equals 12 mm Hg, mean transaortic valve gradient  equals 6 mm Hg, which is probably normal in the presence of  a bioprosthetic aortic valve.  2. Aortic Root: 3.5 cm.  LVOT diameter: 2.2 cm.  Complex atheroma noted in aortic arch/descending aorta.  3. Moderately dilated left atrium.  LA volume index = 43  cc/m2.   Spontaneous echo contrast seen.   No left atrial  or left atrial appendage thrombus.  Decreased left atrial  appendage velocities noted.  4. Moderate concentric left ventricular hypertrophy.  5. Normal left ventricular systolic function. No segmental  wall motion abnormalities.  6. Severe right atrial enlargement.  7. Right ventricular enlargement with normal right  ventricular systolic function.  8. Normal tricuspid valve. Severe tricuspid regurgitation.  9. Estimated pulmonary artery systolic pressure equals 44  mm Hg, assuming right atrial pressure equals 8 mm Hg,  consistentwith mild pulmonary pressures.  ------------------------------------------------------------------------  Confirmed on  7/9/2021 - 11:49:32 by ANAMIKA Horne  ------------------------------------------------------------------------    < end of copied text >    < from: Xray Chest 1 View- PORTABLE-Urgent (Xray Chest 1 View- PORTABLE-Urgent .) (10.11.21 @ 22:08) >  EXAM:  XR CHEST PORTABLE URGENT 1V                            PROCEDURE DATE:  10/11/2021          INTERPRETATION:  Portable chest radiograph    CLINICAL INFORMATION: Admission chest radiograph. Hematuria.    TECHNIQUE:  Portable  AP view of the chest.    COMPARISON: 2/21/2021 chest available for review.    FINDINGS:    The lungs are clear of airspace consolidations or effusions. No pneumothorax.    The  heart is enlarged in transverse diameter. No hilar mass.  Status post median sternotomy.     Visualized osseous structures are intact.    IMPRESSION:   No radiographic evidence of active chest disease.    --- End of Report ---            MARRY STEPHENS MD; Attending Radiologist  This document has been electronically signed. Oct 12 26763:[id number]    < end of copied text >

## 2021-10-13 NOTE — PROGRESS NOTE ADULT - SUBJECTIVE AND OBJECTIVE BOX
Patient is a 86y old  Male who presents with a chief complaint of hematuria (13 Oct 2021 10:12)      INTERVAL HPI/OVERNIGHT EVENTS: Patient seen and examined. NAD. No complaints.    Vital Signs Last 24 Hrs  T(C): 36.9 (13 Oct 2021 11:03), Max: 36.9 (13 Oct 2021 11:03)  T(F): 98.5 (13 Oct 2021 11:03), Max: 98.5 (13 Oct 2021 11:03)  HR: 100 (13 Oct 2021 11:03) (80 - 100)  BP: 113/53 (13 Oct 2021 11:03) (99/60 - 113/53)  BP(mean): --  RR: 18 (13 Oct 2021 11:03) (16 - 18)  SpO2: 91% (13 Oct 2021 11:03) (91% - 100%)    10-13    132<L>  |  98  |  34<H>  ----------------------------<  175<H>  3.8   |  23  |  1.60<H>    Ca    8.7      13 Oct 2021 07:57  Phos  4.2     10-12  Mg     2.3     10-13    TPro  6.6  /  Alb  2.4<L>  /  TBili  0.7  /  DBili  x   /  AST  22  /  ALT  22  /  AlkPhos  137<H>  10-13                          8.1    18.68 )-----------( 311      ( 13 Oct 2021 07:57 )             24.7     PT/INR - ( 11 Oct 2021 18:27 )   PT: 16.6 sec;   INR: 1.44 ratio         PTT - ( 11 Oct 2021 18:27 )  PTT:28.5 sec  CAPILLARY BLOOD GLUCOSE      POCT Blood Glucose.: 303 mg/dL (13 Oct 2021 11:39)  POCT Blood Glucose.: 197 mg/dL (13 Oct 2021 08:11)  POCT Blood Glucose.: 148 mg/dL (12 Oct 2021 21:21)  POCT Blood Glucose.: 240 mg/dL (12 Oct 2021 17:56)  POCT Blood Glucose.: 337 mg/dL (12 Oct 2021 12:35)    Urinalysis Basic - ( 11 Oct 2021 18:27 )    Color: Red / Appearance: bloody / S.015 / pH: x  Gluc: x / Ketone: Trace  / Bili: Negative / Urobili: Negative   Blood: x / Protein: 500 mg/dL / Nitrite: Positive   Leuk Esterase: Trace / RBC: >50 /HPF / WBC 11-25   Sq Epi: x / Non Sq Epi: Occasional / Bacteria: Occasional              acetaminophen   Tablet .. 650 milliGRAM(s) Oral every 6 hours PRN  aluminum hydroxide/magnesium hydroxide/simethicone Suspension 30 milliLiter(s) Oral every 4 hours PRN  aMIOdarone    Tablet 200 milliGRAM(s) Oral daily  cefTRIAXone   IVPB      cefTRIAXone   IVPB 1000 milliGRAM(s) IV Intermittent every 24 hours  dextrose 40% Gel 15 Gram(s) Oral once  dextrose 5%. 1000 milliLiter(s) IV Continuous <Continuous>  dextrose 5%. 1000 milliLiter(s) IV Continuous <Continuous>  dextrose 50% Injectable 25 Gram(s) IV Push once  dextrose 50% Injectable 12.5 Gram(s) IV Push once  dextrose 50% Injectable 25 Gram(s) IV Push once  glucagon  Injectable 1 milliGRAM(s) IntraMuscular once  influenza   Vaccine 0.5 milliLiter(s) IntraMuscular once  insulin glargine Injectable (LANTUS) 15 Unit(s) SubCutaneous at bedtime  insulin lispro (ADMELOG) corrective regimen sliding scale   SubCutaneous three times a day before meals  insulin lispro (ADMELOG) corrective regimen sliding scale   SubCutaneous at bedtime  melatonin 3 milliGRAM(s) Oral at bedtime PRN  metoprolol tartrate 12.5 milliGRAM(s) Oral every 12 hours  ondansetron Injectable 4 milliGRAM(s) IV Push every 8 hours PRN  sodium chloride 0.9%. 1000 milliLiter(s) IV Continuous <Continuous>                REVIEW OF SYSTEMS:  CONSTITUTIONAL: No fever, no weight loss, or no fatigue  NECK: No pain, no stiffness  RESPIRATORY: No cough, no wheezing, no chills, no hemoptysis, No shortness of breath  CARDIOVASCULAR: No chest pain, no palpitations, no dizziness, no leg swelling  GASTROINTESTINAL: No abdominal pain. No nausea, no vomiting, no hematemesis; No diarrhea, no constipation. No melena, no hematochezia.  GENITOURINARY: No dysuria, no frequency, no hematuria, no incontinence  NEUROLOGICAL: No headaches, no loss of strength, no numbness, no tremors  SKIN: No itching, no burning  MUSCULOSKELETAL: No joint pain, no swelling; No muscle, no back, no extremity pain  PSYCHIATRIC: No depression, no mood swings,   HEME/LYMPH: No easy bruising, no bleeding gums  ALLERY AND IMMUNOLOGIC: No hives       Consultant(s) Notes Reviewed:  [X] YES  [ ] NO    PHYSICAL EXAM:  GENERAL: NAD  HEAD:  Atraumatic, Normocephalic  EYES: EOMI, PERRLA, conjunctiva and sclera clear  ENMT: No tonsillar erythema, exudates, or enlargement; Moist mucous membranes  NECK: Supple, No JVD  NERVOUS SYSTEM:  Awake & alert  CHEST/LUNG: Clear to auscultation bilaterally; No rales, rhonchi, wheezing,  HEART: Regular rate and rhythm  ABDOMEN: Soft, Nontender, Nondistended; Bowel sounds present  EXTREMITIES:  No clubbing, cyanosis, or edema  LYMPH: No lymphadenopathy noted  SKIN: No rashes      Advanced care planning discussed with patient/family [X] YES   [ ] NO    Advanced care planning discussed with patient/family. Patient's health status was discussed. All appropriate changes have been made regarding patient's end-of-life care. Advanced care planning forms reviewed/discussed/completed.  20 minutes spent.

## 2021-10-13 NOTE — PROGRESS NOTE ADULT - SUBJECTIVE AND OBJECTIVE BOX
CAPILLARY BLOOD GLUCOSE      POCT Blood Glucose.: 197 mg/dL (13 Oct 2021 08:11)  POCT Blood Glucose.: 148 mg/dL (12 Oct 2021 21:21)  POCT Blood Glucose.: 240 mg/dL (12 Oct 2021 17:56)  POCT Blood Glucose.: 337 mg/dL (12 Oct 2021 12:35)      Vital Signs Last 24 Hrs  T(C): 36.4 (13 Oct 2021 05:19), Max: 36.7 (12 Oct 2021 11:00)  T(F): 97.5 (13 Oct 2021 05:19), Max: 98.1 (12 Oct 2021 11:00)  HR: 88 (13 Oct 2021 05:19) (80 - 99)  BP: 104/56 (13 Oct 2021 05:19) (95/58 - 107/67)  BP(mean): --  RR: 18 (13 Oct 2021 05:19) (14 - 18)  SpO2: 92% (13 Oct 2021 05:19) (92% - 100%)    General: WN/WD NAD  Respiratory: CTA B/L  CV: RRR, S1S2, no murmurs, rubs or gallops  Abdominal: Soft, NT, ND +BS, Last BM  Extremities: No edema, + peripheral pulses     10-13    132<L>  |  98  |  34<H>  ----------------------------<  175<H>  3.8   |  23  |  1.60<H>    Ca    8.7      13 Oct 2021 07:57  Phos  4.2     10-12  Mg     2.1     10-12    TPro  6.6  /  Alb  2.4<L>  /  TBili  0.7  /  DBili  x   /  AST  22  /  ALT  22  /  AlkPhos  137<H>  10-13      dextrose 40% Gel 15 Gram(s) Oral once  dextrose 50% Injectable 25 Gram(s) IV Push once  dextrose 50% Injectable 12.5 Gram(s) IV Push once  dextrose 50% Injectable 25 Gram(s) IV Push once  glucagon  Injectable 1 milliGRAM(s) IntraMuscular once  insulin glargine Injectable (LANTUS) 10 Unit(s) SubCutaneous at bedtime  insulin lispro (ADMELOG) corrective regimen sliding scale   SubCutaneous three times a day before meals  insulin lispro (ADMELOG) corrective regimen sliding scale   SubCutaneous at bedtime

## 2021-10-13 NOTE — PROGRESS NOTE ADULT - SUBJECTIVE AND OBJECTIVE BOX
Patient is a 86y old  Male who presents with a chief complaint of hematuria (13 Oct 2021 12:27)    Patient seen in follow up for       PAST MEDICAL HISTORY:  Diabetes mellitus      MEDICATIONS  (STANDING):  aMIOdarone    Tablet 200 milliGRAM(s) Oral daily  cefTRIAXone   IVPB      cefTRIAXone   IVPB 1000 milliGRAM(s) IV Intermittent every 24 hours  dextrose 40% Gel 15 Gram(s) Oral once  dextrose 5%. 1000 milliLiter(s) (50 mL/Hr) IV Continuous <Continuous>  dextrose 5%. 1000 milliLiter(s) (100 mL/Hr) IV Continuous <Continuous>  dextrose 50% Injectable 25 Gram(s) IV Push once  dextrose 50% Injectable 12.5 Gram(s) IV Push once  dextrose 50% Injectable 25 Gram(s) IV Push once  glucagon  Injectable 1 milliGRAM(s) IntraMuscular once  influenza   Vaccine 0.5 milliLiter(s) IntraMuscular once  insulin glargine Injectable (LANTUS) 15 Unit(s) SubCutaneous at bedtime  insulin lispro (ADMELOG) corrective regimen sliding scale   SubCutaneous three times a day before meals  insulin lispro (ADMELOG) corrective regimen sliding scale   SubCutaneous at bedtime  metoprolol tartrate 12.5 milliGRAM(s) Oral every 12 hours  sodium chloride 0.9%. 1000 milliLiter(s) (75 mL/Hr) IV Continuous <Continuous>    MEDICATIONS  (PRN):  acetaminophen   Tablet .. 650 milliGRAM(s) Oral every 6 hours PRN Temp greater or equal to 38C (100.4F), Mild Pain (1 - 3)  aluminum hydroxide/magnesium hydroxide/simethicone Suspension 30 milliLiter(s) Oral every 4 hours PRN Dyspepsia  melatonin 3 milliGRAM(s) Oral at bedtime PRN Insomnia  ondansetron Injectable 4 milliGRAM(s) IV Push every 8 hours PRN Nausea and/or Vomiting    T(C): 36.9 (10-13-21 @ 11:03), Max: 36.9 (10-13-21 @ 11:03)  HR: 100 (10-13-21 @ 11:03) (80 - 116)  BP: 113/53 (10-13-21 @ 11:03) (95/58 - 122/56)  RR: 18 (10-13-21 @ 11:03) (14 - 18)  SpO2: 91% (10-13-21 @ 11:03) (91% - 100%)  Wt(kg): --  I&O's Detail    12 Oct 2021 07:01  -  13 Oct 2021 07:00  --------------------------------------------------------  IN:    Oral Fluid: 200 mL    sodium chloride 0.9%: 900 mL  Total IN: 1100 mL    OUT:    Stool (mL): 0 mL  Total OUT: 0 mL    Total NET: 1100 mL      13 Oct 2021 07:01  -  13 Oct 2021 12:46  --------------------------------------------------------  IN:    sodium chloride 0.9%: 375 mL  Total IN: 375 mL    OUT:  Total OUT: 0 mL    Total NET: 375 mL          PHYSICAL EXAM:  General: No distress  Respiratory: b/l air entry  Cardiovascular: S1 S2  Gastrointestinal: soft  Extremities:  edema                              8.1    18.68 )-----------( 311      ( 13 Oct 2021 07:57 )             24.7     10-    132<L>  |  98  |  34<H>  ----------------------------<  175<H>  3.8   |  23  |  1.60<H>    Ca    8.7      13 Oct 2021 07:57  Phos  4.2     10-12  Mg     2.3     10-13    TPro  6.6  /  Alb  2.4<L>  /  TBili  0.7  /  DBili  x   /  AST  22  /  ALT  22  /  AlkPhos  137<H>  1013    CARDIAC MARKERS ( 13 Oct 2021 07:57 )  1.040 ng/mL / x     / 62 U/L / x     / 5.0 ng/mL  CARDIAC MARKERS ( 12 Oct 2021 04:56 )  .186 ng/mL / x     / 49 U/L / x     / 3.8 ng/mL      LIVER FUNCTIONS - ( 13 Oct 2021 07:57 )  Alb: 2.4 g/dL / Pro: 6.6 g/dL / ALK PHOS: 137 U/L / ALT: 22 U/L / AST: 22 U/L / GGT: x           Urinalysis Basic - ( 11 Oct 2021 18:27 )    Color: Red / Appearance: bloody / S.015 / pH: x  Gluc: x / Ketone: Trace  / Bili: Negative / Urobili: Negative   Blood: x / Protein: 500 mg/dL / Nitrite: Positive   Leuk Esterase: Trace / RBC: >50 /HPF / WBC 11-25   Sq Epi: x / Non Sq Epi: Occasional / Bacteria: Occasional        Sodium, Serum: 132 (10-13 @ 07:57)  Sodium, Serum: 131 (10-12 @ 04:56)  Sodium, Serum: 130 (10-11 @ 18:27)    Creatinine, Serum: 1.60 (10-13 @ 07:57)  Creatinine, Serum: 2.00 (10-12 @ 04:56)  Creatinine, Serum: 2.00 (10-11 @ 18:27)    Potassium, Serum: 3.8 (10-13 @ 07:57)  Potassium, Serum: 4.3 (10-12 @ 04:56)  Potassium, Serum: 4.4 (10-11 @ 18:27)    Hemoglobin: 8.1 (10-13 @ 07:57)  Hemoglobin: 8.1 (10-12 @ 16:28)  Hemoglobin: 7.0 (10-12 @ 04:55)  Hemoglobin: 7.1 (10-11 @ 18:27)

## 2021-10-14 LAB
ANION GAP SERPL CALC-SCNC: 8 MMOL/L — SIGNIFICANT CHANGE UP (ref 5–17)
BUN SERPL-MCNC: 26 MG/DL — HIGH (ref 7–23)
CALCIUM SERPL-MCNC: 8.1 MG/DL — LOW (ref 8.5–10.1)
CHLORIDE SERPL-SCNC: 102 MMOL/L — SIGNIFICANT CHANGE UP (ref 96–108)
CO2 SERPL-SCNC: 24 MMOL/L — SIGNIFICANT CHANGE UP (ref 22–31)
CREAT SERPL-MCNC: 1.6 MG/DL — HIGH (ref 0.5–1.3)
GLUCOSE SERPL-MCNC: 190 MG/DL — HIGH (ref 70–99)
HCT VFR BLD CALC: 22 % — LOW (ref 39–50)
HGB BLD-MCNC: 7.2 G/DL — LOW (ref 13–17)
MAGNESIUM SERPL-MCNC: 2.2 MG/DL — SIGNIFICANT CHANGE UP (ref 1.6–2.6)
MCHC RBC-ENTMCNC: 29.6 PG — SIGNIFICANT CHANGE UP (ref 27–34)
MCHC RBC-ENTMCNC: 32.7 GM/DL — SIGNIFICANT CHANGE UP (ref 32–36)
MCV RBC AUTO: 90.5 FL — SIGNIFICANT CHANGE UP (ref 80–100)
NRBC # BLD: 0 /100 WBCS — SIGNIFICANT CHANGE UP (ref 0–0)
PLATELET # BLD AUTO: 262 K/UL — SIGNIFICANT CHANGE UP (ref 150–400)
POTASSIUM SERPL-MCNC: 3.6 MMOL/L — SIGNIFICANT CHANGE UP (ref 3.5–5.3)
POTASSIUM SERPL-SCNC: 3.6 MMOL/L — SIGNIFICANT CHANGE UP (ref 3.5–5.3)
RBC # BLD: 2.43 M/UL — LOW (ref 4.2–5.8)
RBC # FLD: 15.4 % — HIGH (ref 10.3–14.5)
SODIUM SERPL-SCNC: 134 MMOL/L — LOW (ref 135–145)
WBC # BLD: 12.15 K/UL — HIGH (ref 3.8–10.5)
WBC # FLD AUTO: 12.15 K/UL — HIGH (ref 3.8–10.5)

## 2021-10-14 PROCEDURE — 99232 SBSQ HOSP IP/OBS MODERATE 35: CPT

## 2021-10-14 RX ORDER — INSULIN LISPRO 100/ML
5 VIAL (ML) SUBCUTANEOUS
Refills: 0 | Status: DISCONTINUED | OUTPATIENT
Start: 2021-10-14 | End: 2021-10-16

## 2021-10-14 RX ADMIN — Medication 4: at 12:07

## 2021-10-14 RX ADMIN — Medication 3 MILLIGRAM(S): at 22:04

## 2021-10-14 RX ADMIN — Medication 12.5 MILLIGRAM(S): at 05:05

## 2021-10-14 RX ADMIN — INSULIN GLARGINE 15 UNIT(S): 100 INJECTION, SOLUTION SUBCUTANEOUS at 22:04

## 2021-10-14 RX ADMIN — Medication 2: at 08:17

## 2021-10-14 RX ADMIN — Medication 5 UNIT(S): at 17:26

## 2021-10-14 RX ADMIN — Medication 6: at 17:26

## 2021-10-14 RX ADMIN — AMIODARONE HYDROCHLORIDE 200 MILLIGRAM(S): 400 TABLET ORAL at 05:05

## 2021-10-14 RX ADMIN — CEFTRIAXONE 100 MILLIGRAM(S): 500 INJECTION, POWDER, FOR SOLUTION INTRAMUSCULAR; INTRAVENOUS at 22:04

## 2021-10-14 RX ADMIN — SODIUM CHLORIDE 75 MILLILITER(S): 9 INJECTION INTRAMUSCULAR; INTRAVENOUS; SUBCUTANEOUS at 16:27

## 2021-10-14 NOTE — PROGRESS NOTE ADULT - SUBJECTIVE AND OBJECTIVE BOX
INTERVAL HPI/OVERNIGHT EVENTS: urine remains bloody on cbi. Per cardiology, no evidence of acute ischemia. Hgb 8.1 and stable 10/13    MEDICATIONS  (STANDING):  aMIOdarone    Tablet 200 milliGRAM(s) Oral daily  cefTRIAXone   IVPB      cefTRIAXone   IVPB 1000 milliGRAM(s) IV Intermittent every 24 hours  dextrose 40% Gel 15 Gram(s) Oral once  dextrose 5%. 1000 milliLiter(s) (50 mL/Hr) IV Continuous <Continuous>  dextrose 5%. 1000 milliLiter(s) (100 mL/Hr) IV Continuous <Continuous>  dextrose 50% Injectable 25 Gram(s) IV Push once  dextrose 50% Injectable 12.5 Gram(s) IV Push once  dextrose 50% Injectable 25 Gram(s) IV Push once  glucagon  Injectable 1 milliGRAM(s) IntraMuscular once  influenza   Vaccine 0.5 milliLiter(s) IntraMuscular once  insulin glargine Injectable (LANTUS) 15 Unit(s) SubCutaneous at bedtime  insulin lispro (ADMELOG) corrective regimen sliding scale   SubCutaneous three times a day before meals  insulin lispro (ADMELOG) corrective regimen sliding scale   SubCutaneous at bedtime  metoprolol tartrate 12.5 milliGRAM(s) Oral every 12 hours  sodium chloride 0.9%. 1000 milliLiter(s) (75 mL/Hr) IV Continuous <Continuous>    MEDICATIONS  (PRN):  acetaminophen   Tablet .. 650 milliGRAM(s) Oral every 6 hours PRN Temp greater or equal to 38C (100.4F), Mild Pain (1 - 3)  aluminum hydroxide/magnesium hydroxide/simethicone Suspension 30 milliLiter(s) Oral every 4 hours PRN Dyspepsia  melatonin 3 milliGRAM(s) Oral at bedtime PRN Insomnia  ondansetron Injectable 4 milliGRAM(s) IV Push every 8 hours PRN Nausea and/or Vomiting        Vital Signs Last 24 Hrs  T(C): 36.9 (14 Oct 2021 04:20), Max: 36.9 (13 Oct 2021 11:03)  T(F): 98.5 (14 Oct 2021 04:20), Max: 98.5 (13 Oct 2021 11:03)  HR: 91 (14 Oct 2021 04:20) (91 - 107)  BP: 115/57 (14 Oct 2021 04:20) (113/53 - 115/57)  BP(mean): --  RR: 18 (14 Oct 2021 04:20) (18 - 18)  SpO2: 92% (14 Oct 2021 04:20) (91% - 94%)    PHYSICAL EXAM:    ABDOMEN: soft nt  GENITALIA: urine puch colored on cbi    LABS:                        8.1    18.68 )-----------( 311      ( 13 Oct 2021 07:57 )             24.7     10-13    132<L>  |  98  |  34<H>  ----------------------------<  175<H>  3.8   |  23  |  1.60<H>    Ca    8.7      13 Oct 2021 07:57  Mg     2.3     10-13    TPro  6.6  /  Alb  2.4<L>  /  TBili  0.7  /  DBili  x   /  AST  22  /  ALT  22  /  AlkPhos  137<H>  10-13        Urine culture:  10-12 @ 05:30 --   No growth to date.  Urine culture:  10-12 @ 00:42 --   <10,000 CFU/mL Normal Urogenital Saida      RADIOLOGY & ADDITIONAL TESTS:

## 2021-10-14 NOTE — PROGRESS NOTE ADULT - SUBJECTIVE AND OBJECTIVE BOX
[facility] -- Ruben Matthews, Jose Meyer, Rony Dennis Savella, [place]  Office # [phone]    Follow Up:  Hx Afib on Eliquis, now with hematuria, CAD s/p stents    Subjective/Observations: Sitting on the chair, comfortable on RA.  CBI still in progress with hematuria.  Denies any respiratory or cardiac discomfort    REVIEW OF SYSTEMS: All other review of systems is negative unless indicated above  PAST MEDICAL & SURGICAL HISTORY:  Diabetes mellitus    History of aortic valve replacement    CAD (coronary artery disease)  cardiac stent    MEDICATIONS  (STANDING):  aMIOdarone    Tablet 200 milliGRAM(s) Oral daily  cefTRIAXone   IVPB      cefTRIAXone   IVPB 1000 milliGRAM(s) IV Intermittent every 24 hours  dextrose 40% Gel 15 Gram(s) Oral once  dextrose 5%. 1000 milliLiter(s) (50 mL/Hr) IV Continuous <Continuous>  dextrose 5%. 1000 milliLiter(s) (100 mL/Hr) IV Continuous <Continuous>  dextrose 50% Injectable 25 Gram(s) IV Push once  dextrose 50% Injectable 12.5 Gram(s) IV Push once  dextrose 50% Injectable 25 Gram(s) IV Push once  glucagon  Injectable 1 milliGRAM(s) IntraMuscular once  influenza   Vaccine 0.5 milliLiter(s) IntraMuscular once  insulin glargine Injectable (LANTUS) 15 Unit(s) SubCutaneous at bedtime  insulin lispro (ADMELOG) corrective regimen sliding scale   SubCutaneous three times a day before meals  insulin lispro (ADMELOG) corrective regimen sliding scale   SubCutaneous at bedtime  metoprolol tartrate 12.5 milliGRAM(s) Oral every 12 hours  sodium chloride 0.9%. 1000 milliLiter(s) (75 mL/Hr) IV Continuous <Continuous>    MEDICATIONS  (PRN):  acetaminophen   Tablet .. 650 milliGRAM(s) Oral every 6 hours PRN Temp greater or equal to 38C (100.4F), Mild Pain (1 - 3)  aluminum hydroxide/magnesium hydroxide/simethicone Suspension 30 milliLiter(s) Oral every 4 hours PRN Dyspepsia  melatonin 3 milliGRAM(s) Oral at bedtime PRN Insomnia  ondansetron Injectable 4 milliGRAM(s) IV Push every 8 hours PRN Nausea and/or Vomiting    Allergies    No Known Allergies    Intolerances    Vital Signs Last 24 Hrs  T(C): 36.9 (14 Oct 2021 04:20), Max: 36.9 (13 Oct 2021 11:03)  T(F): 98.5 (14 Oct 2021 04:20), Max: 98.5 (13 Oct 2021 11:03)  HR: 91 (14 Oct 2021 04:20) (91 - 107)  BP: 115/57 (14 Oct 2021 04:20) (113/53 - 115/57)  BP(mean): --  RR: 18 (14 Oct 2021 04:20) (18 - 18)  SpO2: 92% (14 Oct 2021 04:20) (91% - 94%)  I&O's Summary    13 Oct 2021 07:01  -  14 Oct 2021 07:00  --------------------------------------------------------  IN: 900 mL / OUT: 0 mL / NET: 900 mL    PHYSICAL EXAM:  TELE: Afib with 23 beats WCT, likely, abberancy  Constitutional: NAD, awake and alert, obese  HEENT: Moist Mucous Membranes, Anicteric  Pulmonary: Non-labored, breath sounds are clear but diminished at bases bilaterally, No wheezing, rales or rhonchi  Cardiovascular: IRRR, S1 and S2, + murmurs, no rubs, gallops or clicks  Gastrointestinal: Bowel Sounds present, soft, nontender.   : CBI in progress with hematuria  Lymph: No peripheral edema. No lymphadenopathy.  Skin: No visible rashes or ulcers.  Psych:  Mood & affect appropriate  LABS: All Labs Reviewed:                        7.2    12.15 )-----------( 262      ( 14 Oct 2021 08:01 )             22.0                         8.1    18.68 )-----------( 311      ( 13 Oct 2021 07:57 )             24.7                         8.1    x     )-----------( x        ( 12 Oct 2021 16:28 )             24.3     14 Oct 2021 08:01    134    |  102    |  26     ----------------------------<  190    3.6     |  24     |  1.60   13 Oct 2021 07:57    132    |  98     |  34     ----------------------------<  175    3.8     |  23     |  1.60   12 Oct 2021 04:56    131    |  96     |  34     ----------------------------<  334    4.3     |  25     |  2.00     Ca    8.1        14 Oct 2021 08:01  Ca    8.7        13 Oct 2021 07:57  Ca    8.5        12 Oct 2021 04:56  Phos  4.2       12 Oct 2021 04:56  Mg     2.2       14 Oct 2021 08:01  Mg     2.3       13 Oct 2021 07:57  Mg     2.1       12 Oct 2021 04:56    TPro  6.6    /  Alb  2.4    /  TBili  0.7    /  DBili  x      /  AST  22     /  ALT  22     /  AlkPhos  137    13 Oct 2021 07:57  TPro  7.2    /  Alb  2.7    /  TBili  0.8    /  DBili  x      /  AST  14     /  ALT  21     /  AlkPhos  175    11 Oct 2021 18:27    CARDIAC MARKERS ( 13 Oct 2021 13:57 )  .568 ng/mL / x     / 46 U/L / x     / 4.3 ng/mL  CARDIAC MARKERS ( 13 Oct 2021 07:57 )  1.040 ng/mL / x     / 62 U/L / x     / 5.0 ng/mL    EXAM:  XR CHEST PORTABLE URGENT 1V                          PROCEDURE DATE:  10/11/2021      INTERPRETATION:  Portable chest radiograph    CLINICAL INFORMATION: Admission chest radiograph. Hematuria.    TECHNIQUE:  Portable  AP view of the chest.    COMPARISON: 2/21/2021 chest available for review.    FINDINGS:    The lungs are clear of airspace consolidations or effusions. No pneumothorax.    The  heart is enlarged in transverse diameter. No hilar mass.  Status post median sternotomy.     Visualized osseous structures are intact.    IMPRESSION:   No radiographic evidence of active chest disease.    --- End of Report ---    MARRY STEPHENS MD; Attending Radiologist  This document has been electronically signed. Oct 12 [id number]:04PM    Ventricular Rate 99 BPM    Atrial Rate 267 BPM    QRS Duration 104 ms    Q-T Interval 376 ms    QTC Calculation(Bazett) 482 ms    R Axis -54 degrees    T Axis 126 degrees    Diagnosis Line Atrial fibrillation  Left axis deviation  Inferior infarct , age undetermined  Anterior infarct (cited on or before 21-FEB-2021)  Abnormal ECG  When compared with ECG of 12-OCT-2021 03:46, (Unconfirmed)  QRS axis shifted left  Inferior infarct is now present  T wave inversion no longer evident in Inferior leads  Confirmed by William Garcia MD ([id number]) on 10/12/2021 2:02:07 PM

## 2021-10-14 NOTE — PROGRESS NOTE ADULT - SUBJECTIVE AND OBJECTIVE BOX
CAPILLARY BLOOD GLUCOSE      POCT Blood Glucose.: 223 mg/dL (14 Oct 2021 11:53)  POCT Blood Glucose.: 177 mg/dL (14 Oct 2021 08:05)  POCT Blood Glucose.: 302 mg/dL (13 Oct 2021 21:46)  POCT Blood Glucose.: 344 mg/dL (13 Oct 2021 16:32)      Vital Signs Last 24 Hrs  T(C): 36.6 (14 Oct 2021 10:15), Max: 36.9 (14 Oct 2021 04:20)  T(F): 97.8 (14 Oct 2021 10:15), Max: 98.5 (14 Oct 2021 04:20)  HR: 83 (14 Oct 2021 10:15) (78 - 107)  BP: 125/60 (14 Oct 2021 10:15) (110/63 - 125/60)  BP(mean): --  RR: 17 (14 Oct 2021 10:15) (17 - 18)  SpO2: 96% (14 Oct 2021 10:15) (92% - 96%)    General: WN/WD NAD  Respiratory: CTA B/L  CV: RRR, S1S2, no murmurs, rubs or gallops  Abdominal: Soft, NT, ND +BS, Last BM  Extremities: No edema, + peripheral pulses     10-14    134<L>  |  102  |  26<H>  ----------------------------<  190<H>  3.6   |  24  |  1.60<H>    Ca    8.1<L>      14 Oct 2021 08:01  Mg     2.2     10-14    TPro  6.6  /  Alb  2.4<L>  /  TBili  0.7  /  DBili  x   /  AST  22  /  ALT  22  /  AlkPhos  137<H>  10-13      dextrose 40% Gel 15 Gram(s) Oral once  dextrose 50% Injectable 25 Gram(s) IV Push once  dextrose 50% Injectable 12.5 Gram(s) IV Push once  dextrose 50% Injectable 25 Gram(s) IV Push once  glucagon  Injectable 1 milliGRAM(s) IntraMuscular once  insulin glargine Injectable (LANTUS) 15 Unit(s) SubCutaneous at bedtime  insulin lispro (ADMELOG) corrective regimen sliding scale   SubCutaneous three times a day before meals  insulin lispro (ADMELOG) corrective regimen sliding scale   SubCutaneous at bedtime

## 2021-10-14 NOTE — PROGRESS NOTE ADULT - ASSESSMENT
Ventricular Rate 99 BPM    Atrial Rate 267 BPM    QRS Duration 104 ms    Q-T Interval 376 ms    QTC Calculation(Bazett) 482 ms    R Axis -54 degrees    T Axis 126 degrees    Diagnosis Line Atrial fibrillation  Left axis deviation  Inferior infarct , age undetermined  Anterior infarct (cited on or before 21-FEB-2021)  Abnormal ECG  When compared with ECG of 12-OCT-2021 03:46, (Unconfirmed)  QRS axis shifted left  Inferior infarct is now present  T wave inversion no longer evident in Inferior leads  Confirmed by William Garcia MD ([id number]) on 10/12/2021 2:02:07 PM    < end of copied text >      < from: Transesophageal Echocardiogram (07.09.21 @ 10:21) >    Patient name: VY SHETH  YOB: 1935   Age: 86 (M)   MR#: [id number]  Study Date: 7/9/2021  Location: O/PSonographer: Bridget Callahan [address] Sonographer: Yovanny Gillis M.D.  Study quality: Technically fair  Referring Physician: Demetrius Walker MD  Blood Pressure: 136/67 mmHg  Height: 170 cm  Weight: 84 kg  BSA: 2 m2  ------------------------------------------------------------------------  PROCEDURE: Transesophageal and transthoracic  echocardiograms with 2-D, M-Mode and complete spectral and  color flow Doppler were performed.  Informed consent was  first obtained for BILLY. The patient was sedated - see  anesthesia record.  The procedure was monitored with  automatic blood pressure monitoring, ECG tracings and pulse  oximetry.  The transesophageal probe was placed in the  esophagus posterior to the heart without complications.  INDICATION: Unspecified atrial fibrillation (I48.91)  ------------------------------------------------------------------------  Dimensions:    Normal Values:  LA:     4.7    2.0 - 4.0 cm  Ao:     3.4    2.0 - 3.8 cm  SEPTUM: 1.6    0.6 - 1.2 cm  PWT:    1.4    0.6 - 1.1 cm  LVIDd:  4.4    3.0 - 5.6 cm  LVIDs:  2.9    1.8 - 4.0 cm  Derived variables:  LVMI: 136 g/m2  RWT: 0.63  Fractional short: 34 %  EF (Visual Estimate): 60-65 %  Doppler Peak Velocity (m/sec): AoV=1.7  ------------------------------------------------------------------------  Observations:  Mitral Valve: Mitral annular calcification, otherwise  normal mitral valve. Mild mitral regurgitation.  Aortic Valve/Aorta: Bioprosthetic aortic valve. Peak  transaortic valve gradient equals 12 mm Hg, mean  transaortic valve gradient equals 6 mm Hg, which is  probably normal in the presence of a bioprosthetic aortic  valve. Mild aortic regurgitation.  Peak left ventricular  outflow tract gradient equals 7 mm Hg, LVOT velocity time  integral equals 29 cm.  Aortic Root: 3.5 cm.  LVOT diameter: 2.2 cm.  Complex atheroma noted in aortic arch/descending aorta.  Left Atrium: Moderately dilated left atrium.  LA volume  index = 43 cc/m2.   Spontaneous echo contrast seen.   No  left atrial or left atrial appendage thrombus.   Decreased  left atrial appendage velocities noted.  Left Ventricle: Normal left ventricular systolic function.  No segmental wall motion abnormalities. Moderate concentric  left ventricular hypertrophy.  Right Heart: Severe right atrial enlargement. Right  ventricular enlargement with normal right ventricular  systolic function. Normal tricuspid valve. Severe tricuspid  regurgitation. Normal pulmonic valve.  Pericardium/Pleura: Normal pericardium with no pericardial  effusion.  Hemodynamic: Estimated right atrial pressure is 8 mm Hg.  Estimated right ventricular systolic pressure equals 44 mm  Hg, assuming right atrial pressure equals 8 mm Hg,  consistent with mild pulmonary hypertension. Agitated  saline injection and color flow Doppler demonstrates no  evidence of a patent foramen ovale.  ------------------------------------------------------------------------  Conclusions:  1. Bioprosthetic aortic valve. Peak transaortic valve  gradient equals 12 mm Hg, mean transaortic valve gradient  equals 6 mm Hg, which is probably normal in the presence of  a bioprosthetic aortic valve.  2. Aortic Root: 3.5 cm.  LVOT diameter: 2.2 cm.  Complex atheroma noted in aortic arch/descending aorta.  3. Moderately dilated left atrium.  LA volume index = 43  cc/m2.   Spontaneous echo contrast seen.   No left atrial  or left atrial appendage thrombus.  Decreased left atrial  appendage velocities noted.  4. Moderate concentric left ventricular hypertrophy.  5. Normal left ventricular systolic function. No segmental  wall motion abnormalities.  6. Severe right atrial enlargement.  7. Right ventricular enlargement with normal right  ventricular systolic function.  8. Normal tricuspid valve. Severe tricuspid regurgitation.  9. Estimated pulmonary artery systolic pressure equals 44  mm Hg, assuming right atrial pressure equals 8 mm Hg,  consistentwith mild pulmonary pressures.  ------------------------------------------------------------------------  Confirmed on  7/9/2021 - 11:49:32 by ANAMIKA Horne  ------------------------------------------------------------------------    < end of copied text >    < from: Xray Chest 1 View- PORTABLE-Urgent (Xray Chest 1 View- PORTABLE-Urgent .) (10.11.21 @ 22:08) >  EXAM:  XR CHEST PORTABLE URGENT 1V                            PROCEDURE DATE:  10/11/2021      INTERPRETATION:  Portable chest radiograph    CLINICAL INFORMATION: Admission chest radiograph. Hematuria.    TECHNIQUE:  Portable  AP view of the chest.    COMPARISON: 2/21/2021 chest available for review.    FINDINGS:    The lungs are clear of airspace consolidations or effusions. No pneumothorax.    The  heart is enlarged in transverse diameter. No hilar mass.  Status post median sternotomy.     Visualized osseous structures are intact.    IMPRESSION:   No radiographic evidence of active chest disease.    --- End of Report ---    MARRY STEPHENS MD; Attending Radiologist  This document has been electronically signed. Oct 12 56526:04PM    < end of copied text >          85 yo male with PMH afib (on Eliquis, s/p BILLY cardioversion 7/2021), BPH, CAD (s/p stent 2005), Aortic valve replacement (2001) presents to ED hematuria found to be in Afib with RVR.     Chronic Afib  - S/P BILLY cardioversion 7/2021.  On home Eliquis.  On hold prior to his urology procedure (?TURP) on 10/1/21  - Presented with hematuria, now on CBI.  Continue to hold Eliquis for now and resume when cleared by Uro  - Rapid atrial fibrillation on admission likely from missed bb dose for the last few days  - Continue BB.  On home Amio.  However, in the setting of persistent Afib, can eventually discontinue and uptitrate BB as BP allows.  Follows with Dr. Miranda  - Monitor electrolytes, replete to keep K>4 and Mag>2     Hematuria   - On CBI with mild hematuria; follow Uro recs  - s/p 2 units PRBC.  Transfuse PRBC PRN to keep Hgb >8 given cardiac hx   - Resume Eliquis when cleared by Uro  - Monitor H/H, still trending down    CAD s/p stents/Elevated trops  - Trops 0.186. CKs are flat, suggesting against acute atherosclerotic plaque rupture.  - Patient is not complaining of any cardiac symptoms at this time.  - EKG showing mild ST wave depressions  - Mild demand ischemia likely in the setting of acute stress vs anemia  - Monitor closely for the development of anginal symptoms or clinical signs of ischemia.     BLE edema  - He has no Hx of LHF  - Recent BILLY showed normal LVF, EF 60-65%, AVR, (bio), dilated LA, RVE with normal RVSF, severe SULMA, and severe TR  - Edema appears to be chronic, on home compression stockings    Aortic valve replacement ~20  years ago   - Last echo 7/2021 showing EF 60-65%  - Appears stable.  No clear evidence of volume overload apart from BLE edema    RANDOLPH on CKD  - Improving and creatinine stable at 1.6  - Avoid nephrotoxics  - Follow Renal recs    DVT ppx  - Per Primary    - Other cardiovascular workup will depend on clinical course.  - All other workup per primary team.  - Will continue to follow.    Stephie Tarango DNP, NP-C  Cardiology   Spectra #[phone]

## 2021-10-14 NOTE — PROGRESS NOTE ADULT - NUTRITIONAL ASSESSMENT
85 yo male with PMH afib (on Eliquis, s/p BILLY cardioversion 7/2021), BPH, CAD (s/p stent 2005), Aortic valve replacement (2001) presents to ED hematuria found to be in Afib with RVR.     Chronic Afib  - S/P BILLY cardioversion 7/2021.  On home Eliquis.  On hold prior to his urology procedure (?TURP) on 10/1/21  - Presented with hematuria, now on CBI.  Continue to hold Eliquis for now and resume when cleared by Uro  - Rapid atrial fibrillation on admission likely from missed bb dose for the last few days  - Continue BB.  On home Amio.  However, in the setting of persistent Afib, can eventually discontinue and uptitrate BB as BP allows.  Follows with Dr. Miranda  - Monitor electrolytes, replete to keep K>4 and Mag>2     Hematuria   - On CBI with mild hematuria; follow Uro recs  - s/p 2 units PRBC.  Transfuse PRBC PRN to keep Hgb >8 given cardiac hx   - Resume Eliquis when cleared by Uro  - Monitor H/H, still trending down    CAD s/p stents/Elevated trops  - Trops 0.186. CKs are flat, suggesting against acute atherosclerotic plaque rupture.  - Patient is not complaining of any cardiac symptoms at this time.  - EKG showing mild ST wave depressions  - Mild demand ischemia likely in the setting of acute stress vs anemia  - Monitor closely for the development of anginal symptoms or clinical signs of ischemia.     BLE edema  - He has no Hx of LHF  - Recent BILLY showed normal LVF, EF 60-65%, AVR, (bio), dilated LA, RVE with normal RVSF, severe SULMA, and severe TR  - Edema appears to be chronic, on home compression stockings    Aortic valve replacement ~20  years ago   - Last echo 7/2021 showing EF 60-65%  - Appears stable.  No clear evidence of volume overload apart from BLE edema    RANDOLPH on CKD  - Improving and creatinine stable at 1.6  - Avoid nephrotoxics  - Follow Renal recs    DVT ppx  - Per Primary    - Other cardiovascular workup will depend on clinical course.  - All other workup per primary team.  - Will continue to follow.    Stephie Tarango DNP, NP-C  Cardiology   Spectra #[phone]

## 2021-10-14 NOTE — PROGRESS NOTE ADULT - SUBJECTIVE AND OBJECTIVE BOX
Patient is a 86y old  Male who presents with a chief complaint of hematuria (14 Oct 2021 08:34)      INTERVAL HPI/OVERNIGHT EVENTS: Patient seen and examined. NAD. No complaints.    Vital Signs Last 24 Hrs  T(C): 36.6 (14 Oct 2021 10:15), Max: 36.9 (14 Oct 2021 04:20)  T(F): 97.8 (14 Oct 2021 10:15), Max: 98.5 (14 Oct 2021 04:20)  HR: 83 (14 Oct 2021 10:15) (78 - 107)  BP: 125/60 (14 Oct 2021 10:15) (110/63 - 125/60)  BP(mean): --  RR: 17 (14 Oct 2021 10:15) (17 - 18)  SpO2: 96% (14 Oct 2021 10:15) (92% - 96%)    10-14    134<L>  |  102  |  26<H>  ----------------------------<  190<H>  3.6   |  24  |  1.60<H>    Ca    8.1<L>      14 Oct 2021 08:01  Mg     2.2     10-14    TPro  6.6  /  Alb  2.4<L>  /  TBili  0.7  /  DBili  x   /  AST  22  /  ALT  22  /  AlkPhos  137<H>  10-13                          7.2    12.15 )-----------( 262      ( 14 Oct 2021 08:01 )             22.0       CAPILLARY BLOOD GLUCOSE      POCT Blood Glucose.: 223 mg/dL (14 Oct 2021 11:53)  POCT Blood Glucose.: 177 mg/dL (14 Oct 2021 08:05)  POCT Blood Glucose.: 302 mg/dL (13 Oct 2021 21:46)  POCT Blood Glucose.: 344 mg/dL (13 Oct 2021 16:32)              acetaminophen   Tablet .. 650 milliGRAM(s) Oral every 6 hours PRN  aluminum hydroxide/magnesium hydroxide/simethicone Suspension 30 milliLiter(s) Oral every 4 hours PRN  aMIOdarone    Tablet 200 milliGRAM(s) Oral daily  cefTRIAXone   IVPB      cefTRIAXone   IVPB 1000 milliGRAM(s) IV Intermittent every 24 hours  dextrose 40% Gel 15 Gram(s) Oral once  dextrose 5%. 1000 milliLiter(s) IV Continuous <Continuous>  dextrose 5%. 1000 milliLiter(s) IV Continuous <Continuous>  dextrose 50% Injectable 25 Gram(s) IV Push once  dextrose 50% Injectable 12.5 Gram(s) IV Push once  dextrose 50% Injectable 25 Gram(s) IV Push once  glucagon  Injectable 1 milliGRAM(s) IntraMuscular once  influenza   Vaccine 0.5 milliLiter(s) IntraMuscular once  insulin glargine Injectable (LANTUS) 15 Unit(s) SubCutaneous at bedtime  insulin lispro (ADMELOG) corrective regimen sliding scale   SubCutaneous three times a day before meals  insulin lispro (ADMELOG) corrective regimen sliding scale   SubCutaneous at bedtime  melatonin 3 milliGRAM(s) Oral at bedtime PRN  metoprolol tartrate 12.5 milliGRAM(s) Oral every 12 hours  ondansetron Injectable 4 milliGRAM(s) IV Push every 8 hours PRN  sodium chloride 0.9%. 1000 milliLiter(s) IV Continuous <Continuous>    Culture - Blood (10.12.21 @ 05:30)   Specimen Source: .Blood Blood-Peripheral   Culture Results:   No growth to date.       Historical Values  Culture - Blood (10.12.21 @ 05:30)   Specimen Source: .Blood Blood-Peripheral   Culture Results:   No growth to date.   Culture - Blood (10.12.21 @ 05:30)   Specimen Source: .Blood Blood   Culture Results:   No growth to date. Culture - Urine (10.12.21 @ 00:42)   Specimen Source: Catheterized Catheterized   Culture Results:   <10,000 CFU/mL Normal Urogenital Saida       Historical Values  Culture - Urine (10.12.21 @ 00:42)   Specimen Source: Catheterized Catheterized   Culture Results:   <10,000 CFU/mL Normal Urogenital Saida           REVIEW OF SYSTEMS:  CONSTITUTIONAL: No fever, no weight loss, or no fatigue  NECK: No pain, no stiffness  RESPIRATORY: No cough, no wheezing, no chills, no hemoptysis, No shortness of breath  CARDIOVASCULAR: No chest pain, no palpitations, no dizziness, no leg swelling  GASTROINTESTINAL: No abdominal pain. No nausea, no vomiting, no hematemesis; No diarrhea, no constipation. No melena, no hematochezia.  GENITOURINARY: No dysuria, no frequency, + hematuria, no incontinence  NEUROLOGICAL: No headaches, no loss of strength, no numbness, no tremors  SKIN: No itching, no burning  MUSCULOSKELETAL: No joint pain, no swelling; No muscle, no back, no extremity pain  PSYCHIATRIC: No depression, no mood swings,   HEME/LYMPH: No easy bruising, no bleeding gums  ALLERY AND IMMUNOLOGIC: No hives       Consultant(s) Notes Reviewed:  [X] YES  [ ] NO    PHYSICAL EXAM:  GENERAL: NAD  HEAD:  Atraumatic, Normocephalic  EYES: EOMI, PERRLA, conjunctiva and sclera clear  ENMT: No tonsillar erythema, exudates, or enlargement; Moist mucous membranes  NECK: Supple, No JVD  NERVOUS SYSTEM:  Awake & alert  CHEST/LUNG: Clear to auscultation bilaterally; No rales, rhonchi, wheezing,  HEART: Regular rate and rhythm  ABDOMEN: Soft, Nontender, Nondistended; Bowel sounds present  EXTREMITIES:  No clubbing, cyanosis, or edema  LYMPH: No lymphadenopathy noted  SKIN: No rashes      Advanced care planning discussed with patient/family [X] YES   [ ] NO    Advanced care planning discussed with patient/family. Patient's health status was discussed. All appropriate changes have been made regarding patient's end-of-life care. Advanced care planning forms reviewed/discussed/completed.  20 minutes spent.

## 2021-10-14 NOTE — PROGRESS NOTE ADULT - SUBJECTIVE AND OBJECTIVE BOX
Patient is a 86y old  Male who presents with a chief complaint of hematuria (13 Oct 2021 12:27)    Patient seen in follow up for RANDOLPH.        PAST MEDICAL HISTORY:  Diabetes mellitus      MEDICATIONS  (STANDING):  aMIOdarone    Tablet 200 milliGRAM(s) Oral daily  cefTRIAXone   IVPB      cefTRIAXone   IVPB 1000 milliGRAM(s) IV Intermittent every 24 hours  dextrose 40% Gel 15 Gram(s) Oral once  dextrose 5%. 1000 milliLiter(s) (50 mL/Hr) IV Continuous <Continuous>  dextrose 5%. 1000 milliLiter(s) (100 mL/Hr) IV Continuous <Continuous>  dextrose 50% Injectable 25 Gram(s) IV Push once  dextrose 50% Injectable 12.5 Gram(s) IV Push once  dextrose 50% Injectable 25 Gram(s) IV Push once  glucagon  Injectable 1 milliGRAM(s) IntraMuscular once  influenza   Vaccine 0.5 milliLiter(s) IntraMuscular once  insulin glargine Injectable (LANTUS) 15 Unit(s) SubCutaneous at bedtime  insulin lispro (ADMELOG) corrective regimen sliding scale   SubCutaneous three times a day before meals  insulin lispro (ADMELOG) corrective regimen sliding scale   SubCutaneous at bedtime  insulin lispro Injectable (ADMELOG) 5 Unit(s) SubCutaneous three times a day before meals  metoprolol tartrate 12.5 milliGRAM(s) Oral every 12 hours  sodium chloride 0.9%. 1000 milliLiter(s) (75 mL/Hr) IV Continuous <Continuous>    MEDICATIONS  (PRN):  acetaminophen   Tablet .. 650 milliGRAM(s) Oral every 6 hours PRN Temp greater or equal to 38C (100.4F), Mild Pain (1 - 3)  aluminum hydroxide/magnesium hydroxide/simethicone Suspension 30 milliLiter(s) Oral every 4 hours PRN Dyspepsia  melatonin 3 milliGRAM(s) Oral at bedtime PRN Insomnia  ondansetron Injectable 4 milliGRAM(s) IV Push every 8 hours PRN Nausea and/or Vomiting    T(C): 37.1 (10-14-21 @ 16:47), Max: 37.1 (10-14-21 @ 16:47)  HR: 91 (10-14-21 @ 16:47) (78 - 107)  BP: 115/54 (10-14-21 @ 16:47) (102/60 - 125/60)  RR: 16 (10-14-21 @ 16:47)  SpO2: 96% (10-14-21 @ 16:47)  Wt(kg): --  I&O's Detail    13 Oct 2021 07:01  -  14 Oct 2021 07:00  --------------------------------------------------------  IN:    sodium chloride 0.9%: 975 mL  Total IN: 975 mL    OUT:  Total OUT: 0 mL    Total NET: 975 mL      14 Oct 2021 07:01  -  14 Oct 2021 17:55  --------------------------------------------------------  IN:    Oral Fluid: 120 mL    sodium chloride 0.9%: 375 mL  Total IN: 495 mL    OUT:  Total OUT: 0 mL    Total NET: 495 mL              PHYSICAL EXAM:  General: No distress  Respiratory: b/l air entry  Cardiovascular: S1 S2  Gastrointestinal: soft  Extremities:  edema                          LABORATORY:                        7.2    12.15 )-----------( 262      ( 14 Oct 2021 08:01 )             22.0     10-14    134<L>  |  102  |  26<H>  ----------------------------<  190<H>  3.6   |  24  |  1.60<H>    Ca    8.1<L>      14 Oct 2021 08:01  Mg     2.2     10-14    TPro  6.6  /  Alb  2.4<L>  /  TBili  0.7  /  DBili  x   /  AST  22  /  ALT  22  /  AlkPhos  137<H>  10-13    Sodium, Serum: 134 mmol/L (10-14 @ 08:01)  Sodium, Serum: 132 mmol/L (10-13 @ 07:57)    Potassium, Serum: 3.6 mmol/L (10-14 @ 08:01)  Potassium, Serum: 3.8 mmol/L (10-13 @ 07:57)    Hemoglobin: 7.2 g/dL (10-14 @ 08:01)  Hemoglobin: 8.1 g/dL (10-13 @ 07:57)  Hemoglobin: 8.1 g/dL (10-12 @ 16:28)  Hemoglobin: 7.0 g/dL (10-12 @ 04:55)    Creatinine, Serum 1.60 (10-14 @ 08:01)  Creatinine, Serum 1.60 (10-13 @ 07:57)  Creatinine, Serum 2.00 (10-12 @ 04:56)  Creatinine, Serum 2.00 (10-11 @ 18:27)    CARDIAC MARKERS ( 13 Oct 2021 13:57 )  .568 ng/mL / x     / 46 U/L / x     / 4.3 ng/mL  CARDIAC MARKERS ( 13 Oct 2021 07:57 )  1.040 ng/mL / x     / 62 U/L / x     / 5.0 ng/mL      LIVER FUNCTIONS - ( 13 Oct 2021 07:57 )  Alb: 2.4 g/dL / Pro: 6.6 g/dL / ALK PHOS: 137 U/L / ALT: 22 U/L / AST: 22 U/L / GGT: x

## 2021-10-15 LAB
ANION GAP SERPL CALC-SCNC: 7 MMOL/L — SIGNIFICANT CHANGE UP (ref 5–17)
BLD GP AB SCN SERPL QL: SIGNIFICANT CHANGE UP
BUN SERPL-MCNC: 19 MG/DL — SIGNIFICANT CHANGE UP (ref 7–23)
CALCIUM SERPL-MCNC: 8.1 MG/DL — LOW (ref 8.5–10.1)
CHLORIDE SERPL-SCNC: 105 MMOL/L — SIGNIFICANT CHANGE UP (ref 96–108)
CO2 SERPL-SCNC: 26 MMOL/L — SIGNIFICANT CHANGE UP (ref 22–31)
CREAT SERPL-MCNC: 1.5 MG/DL — HIGH (ref 0.5–1.3)
GLUCOSE SERPL-MCNC: 53 MG/DL — CRITICAL LOW (ref 70–99)
HCT VFR BLD CALC: 25.2 % — LOW (ref 39–50)
HGB BLD-MCNC: 8.3 G/DL — LOW (ref 13–17)
MAGNESIUM SERPL-MCNC: 2.1 MG/DL — SIGNIFICANT CHANGE UP (ref 1.6–2.6)
MCHC RBC-ENTMCNC: 29.9 PG — SIGNIFICANT CHANGE UP (ref 27–34)
MCHC RBC-ENTMCNC: 32.9 GM/DL — SIGNIFICANT CHANGE UP (ref 32–36)
MCV RBC AUTO: 90.6 FL — SIGNIFICANT CHANGE UP (ref 80–100)
NRBC # BLD: 0 /100 WBCS — SIGNIFICANT CHANGE UP (ref 0–0)
PLATELET # BLD AUTO: 265 K/UL — SIGNIFICANT CHANGE UP (ref 150–400)
POTASSIUM SERPL-MCNC: 3.7 MMOL/L — SIGNIFICANT CHANGE UP (ref 3.5–5.3)
POTASSIUM SERPL-SCNC: 3.7 MMOL/L — SIGNIFICANT CHANGE UP (ref 3.5–5.3)
RBC # BLD: 2.78 M/UL — LOW (ref 4.2–5.8)
RBC # FLD: 15 % — HIGH (ref 10.3–14.5)
SODIUM SERPL-SCNC: 138 MMOL/L — SIGNIFICANT CHANGE UP (ref 135–145)
WBC # BLD: 10.43 K/UL — SIGNIFICANT CHANGE UP (ref 3.8–10.5)
WBC # FLD AUTO: 10.43 K/UL — SIGNIFICANT CHANGE UP (ref 3.8–10.5)

## 2021-10-15 PROCEDURE — 99233 SBSQ HOSP IP/OBS HIGH 50: CPT

## 2021-10-15 RX ORDER — POLYETHYLENE GLYCOL 3350 17 G/17G
17 POWDER, FOR SOLUTION ORAL DAILY
Refills: 0 | Status: DISCONTINUED | OUTPATIENT
Start: 2021-10-15 | End: 2021-10-20

## 2021-10-15 RX ADMIN — SODIUM CHLORIDE 75 MILLILITER(S): 9 INJECTION INTRAMUSCULAR; INTRAVENOUS; SUBCUTANEOUS at 05:49

## 2021-10-15 RX ADMIN — INSULIN GLARGINE 15 UNIT(S): 100 INJECTION, SOLUTION SUBCUTANEOUS at 22:38

## 2021-10-15 RX ADMIN — Medication 5 UNIT(S): at 17:25

## 2021-10-15 RX ADMIN — Medication 5 UNIT(S): at 08:21

## 2021-10-15 RX ADMIN — Medication 12.5 MILLIGRAM(S): at 17:44

## 2021-10-15 RX ADMIN — AMIODARONE HYDROCHLORIDE 200 MILLIGRAM(S): 400 TABLET ORAL at 05:16

## 2021-10-15 RX ADMIN — Medication 5 UNIT(S): at 12:14

## 2021-10-15 RX ADMIN — CEFTRIAXONE 100 MILLIGRAM(S): 500 INJECTION, POWDER, FOR SOLUTION INTRAMUSCULAR; INTRAVENOUS at 21:33

## 2021-10-15 RX ADMIN — POLYETHYLENE GLYCOL 3350 17 GRAM(S): 17 POWDER, FOR SOLUTION ORAL at 17:47

## 2021-10-15 RX ADMIN — Medication 12.5 MILLIGRAM(S): at 05:16

## 2021-10-15 RX ADMIN — Medication 3 MILLIGRAM(S): at 21:45

## 2021-10-15 NOTE — PROGRESS NOTE ADULT - SUBJECTIVE AND OBJECTIVE BOX
[facility] -- Ruben Matthews, Betsy, Jose, Rony Dennis Savella  Office # [phone]      Follow Up:    afib, cad,   Subjective/Observations:   seen at bedside sitting in chair CBI in place   complaints of LE edema bilaterally, denies chest pain dizziness palpitations or dyspnea     REVIEW OF SYSTEMS: All other review of systems is negative unless indicated above    PAST MEDICAL & SURGICAL HISTORY:  Diabetes mellitus    History of aortic valve replacement    CAD (coronary artery disease)  cardiac stent        MEDICATIONS  (STANDING):  aMIOdarone    Tablet 200 milliGRAM(s) Oral daily  cefTRIAXone   IVPB      cefTRIAXone   IVPB 1000 milliGRAM(s) IV Intermittent every 24 hours  dextrose 40% Gel 15 Gram(s) Oral once  dextrose 5%. 1000 milliLiter(s) (50 mL/Hr) IV Continuous <Continuous>  dextrose 5%. 1000 milliLiter(s) (100 mL/Hr) IV Continuous <Continuous>  dextrose 50% Injectable 25 Gram(s) IV Push once  dextrose 50% Injectable 12.5 Gram(s) IV Push once  dextrose 50% Injectable 25 Gram(s) IV Push once  glucagon  Injectable 1 milliGRAM(s) IntraMuscular once  influenza   Vaccine 0.5 milliLiter(s) IntraMuscular once  insulin glargine Injectable (LANTUS) 15 Unit(s) SubCutaneous at bedtime  insulin lispro (ADMELOG) corrective regimen sliding scale   SubCutaneous three times a day before meals  insulin lispro (ADMELOG) corrective regimen sliding scale   SubCutaneous at bedtime  insulin lispro Injectable (ADMELOG) 5 Unit(s) SubCutaneous three times a day before meals  metoprolol tartrate 12.5 milliGRAM(s) Oral every 12 hours  sodium chloride 0.9%. 1000 milliLiter(s) (75 mL/Hr) IV Continuous <Continuous>    MEDICATIONS  (PRN):  acetaminophen   Tablet .. 650 milliGRAM(s) Oral every 6 hours PRN Temp greater or equal to 38C (100.4F), Mild Pain (1 - 3)  aluminum hydroxide/magnesium hydroxide/simethicone Suspension 30 milliLiter(s) Oral every 4 hours PRN Dyspepsia  melatonin 3 milliGRAM(s) Oral at bedtime PRN Insomnia  ondansetron Injectable 4 milliGRAM(s) IV Push every 8 hours PRN Nausea and/or Vomiting      Allergies    No Known Allergies    Intolerances        Vital Signs Last 24 Hrs  T(C): 37.4 (15 Oct 2021 05:12), Max: 37.4 (15 Oct 2021 05:12)  T(F): 99.4 (15 Oct 2021 05:12), Max: 99.4 (15 Oct 2021 05:12)  HR: 86 (15 Oct 2021 05:12) (83 - 91)  BP: 126/52 (15 Oct 2021 05:12) (102/60 - 126/52)  BP(mean): --  RR: 17 (15 Oct 2021 05:12) (16 - 17)  SpO2: 92% (15 Oct 2021 05:12) (92% - 96%)    I&O's Summary    14 Oct 2021 07:01  -  15 Oct 2021 07:00  --------------------------------------------------------  IN: 1620 mL / OUT: 0 mL / NET: 1620 mL          PHYSICAL EXAM:  TELE: AF   Constitutional: NAD, awake and alert, well-developed  HEENT: Moist Mucous Membranes, Anicteric  Pulmonary: Non-labored, breath sounds are clear bilaterally, No wheezing, crackles or rhonchi  Cardiovascular: IRRegular, S1 and S2 nl, murmur   Gastrointestinal: Bowel Sounds present, soft, nontender., CBI, + hematuria in sherman noted   Lymph:+ bilateral peripheral edema.  Skin: No visible rashes or ulcers.  Psych:  Mood & affect appropriate    LABS: All Labs Reviewed:                        8.3    10.43 )-----------( 265      ( 15 Oct 2021 08:22 )             25.2                         7.2    12.15 )-----------( 262      ( 14 Oct 2021 08:01 )             22.0                         8.1    18.68 )-----------( 311      ( 13 Oct 2021 07:57 )             24.7     15 Oct 2021 08:22    138    |  105    |  19     ----------------------------<  53     3.7     |  26     |  1.50   14 Oct 2021 08:01    134    |  102    |  26     ----------------------------<  190    3.6     |  24     |  1.60   13 Oct 2021 07:57    132    |  98     |  34     ----------------------------<  175    3.8     |  23     |  1.60     Ca    8.1        15 Oct 2021 08:22  Ca    8.1        14 Oct 2021 08:01  Ca    8.7        13 Oct 2021 07:57  Mg     2.1       15 Oct 2021 08:22  Mg     2.2       14 Oct 2021 08:01  Mg     2.3       13 Oct 2021 07:57    TPro  6.6    /  Alb  2.4    /  TBili  0.7    /  DBili  x      /  AST  22     /  ALT  22     /  AlkPhos  137    13 Oct 2021 07:57      CARDIAC MARKERS ( 13 Oct 2021 13:57 )  .568 ng/mL / x     / 46 U/L / x     / 4.3 ng/mL    EXAM:  XR CHEST PORTABLE URGENT 1V                          PROCEDURE DATE:  10/11/2021      INTERPRETATION:  Portable chest radiograph    CLINICAL INFORMATION: Admission chest radiograph. Hematuria.    TECHNIQUE:  Portable  AP view of the chest.    COMPARISON: 2/21/2021 chest available for review.    FINDINGS:    The lungs are clear of airspace consolidations or effusions. No pneumothorax.    The  heart is enlarged in transverse diameter. No hilar mass.  Status post median sternotomy.     Visualized osseous structures are intact.    IMPRESSION:   No radiographic evidence of active chest disease.    --- End of Report ---    MARRY STEPHENS MD; Attending Radiologist  This document has been electronically signed. Oct 12 93565:04PM    Ventricular Rate 99 BPM    Atrial Rate 267 BPM    QRS Duration 104 ms    Q-T Interval 376 ms    QTC Calculation(Bazett) 482 ms    R Axis -54 degrees    T Axis 126 degrees    Diagnosis Line Atrial fibrillation  Left axis deviation  Inferior infarct , age undetermined  Anterior infarct (cited on or before 21-FEB-2021)  Abnormal ECG  When compared with ECG of 12-OCT-2021 03:46, (Unconfirmed)  QRS axis shifted left  Inferior infarct is now present  T wave inversion no longer evident in Inferior leads              [facility] -- Ruben Matthews, Betsy, Jose, Rony Dennis Savella  Office # [phone]      Follow Up:  afib, cad     Subjective/Observations:   seen at bedside sitting in chair CBI in place   complaints of LE edema bilaterally, denies chest pain dizziness palpitations or dyspnea     REVIEW OF SYSTEMS: All other review of systems is negative unless indicated above    PAST MEDICAL & SURGICAL HISTORY:  Diabetes mellitus    History of aortic valve replacement    CAD (coronary artery disease)  cardiac stent        MEDICATIONS  (STANDING):  aMIOdarone    Tablet 200 milliGRAM(s) Oral daily  cefTRIAXone   IVPB      cefTRIAXone   IVPB 1000 milliGRAM(s) IV Intermittent every 24 hours  dextrose 40% Gel 15 Gram(s) Oral once  dextrose 5%. 1000 milliLiter(s) (50 mL/Hr) IV Continuous <Continuous>  dextrose 5%. 1000 milliLiter(s) (100 mL/Hr) IV Continuous <Continuous>  dextrose 50% Injectable 25 Gram(s) IV Push once  dextrose 50% Injectable 12.5 Gram(s) IV Push once  dextrose 50% Injectable 25 Gram(s) IV Push once  glucagon  Injectable 1 milliGRAM(s) IntraMuscular once  influenza   Vaccine 0.5 milliLiter(s) IntraMuscular once  insulin glargine Injectable (LANTUS) 15 Unit(s) SubCutaneous at bedtime  insulin lispro (ADMELOG) corrective regimen sliding scale   SubCutaneous three times a day before meals  insulin lispro (ADMELOG) corrective regimen sliding scale   SubCutaneous at bedtime  insulin lispro Injectable (ADMELOG) 5 Unit(s) SubCutaneous three times a day before meals  metoprolol tartrate 12.5 milliGRAM(s) Oral every 12 hours  sodium chloride 0.9%. 1000 milliLiter(s) (75 mL/Hr) IV Continuous <Continuous>    MEDICATIONS  (PRN):  acetaminophen   Tablet .. 650 milliGRAM(s) Oral every 6 hours PRN Temp greater or equal to 38C (100.4F), Mild Pain (1 - 3)  aluminum hydroxide/magnesium hydroxide/simethicone Suspension 30 milliLiter(s) Oral every 4 hours PRN Dyspepsia  melatonin 3 milliGRAM(s) Oral at bedtime PRN Insomnia  ondansetron Injectable 4 milliGRAM(s) IV Push every 8 hours PRN Nausea and/or Vomiting      Allergies    No Known Allergies    Intolerances        Vital Signs Last 24 Hrs  T(C): 37.4 (15 Oct 2021 05:12), Max: 37.4 (15 Oct 2021 05:12)  T(F): 99.4 (15 Oct 2021 05:12), Max: 99.4 (15 Oct 2021 05:12)  HR: 86 (15 Oct 2021 05:12) (83 - 91)  BP: 126/52 (15 Oct 2021 05:12) (102/60 - 126/52)  BP(mean): --  RR: 17 (15 Oct 2021 05:12) (16 - 17)  SpO2: 92% (15 Oct 2021 05:12) (92% - 96%)    I&O's Summary    14 Oct 2021 07:01  -  15 Oct 2021 07:00  --------------------------------------------------------  IN: 1620 mL / OUT: 0 mL / NET: 1620 mL          PHYSICAL EXAM:  TELE: AF   Constitutional: NAD, awake and alert, well-developed  HEENT: Moist Mucous Membranes, Anicteric  Pulmonary: Non-labored, breath sounds are clear bilaterally, No wheezing, crackles or rhonchi  Cardiovascular: IRRegular, S1 and S2 nl, murmur   Gastrointestinal: Bowel Sounds present, soft, nontender., CBI, + hematuria in sherman noted   Lymph:+ bilateral peripheral edema.  Skin: No visible rashes or ulcers.  Psych:  Mood & affect appropriate    LABS: All Labs Reviewed:                        8.3    10.43 )-----------( 265      ( 15 Oct 2021 08:22 )             25.2                         7.2    12.15 )-----------( 262      ( 14 Oct 2021 08:01 )             22.0                         8.1    18.68 )-----------( 311      ( 13 Oct 2021 07:57 )             24.7     15 Oct 2021 08:22    138    |  105    |  19     ----------------------------<  53     3.7     |  26     |  1.50   14 Oct 2021 08:01    134    |  102    |  26     ----------------------------<  190    3.6     |  24     |  1.60   13 Oct 2021 07:57    132    |  98     |  34     ----------------------------<  175    3.8     |  23     |  1.60     Ca    8.1        15 Oct 2021 08:22  Ca    8.1        14 Oct 2021 08:01  Ca    8.7        13 Oct 2021 07:57  Mg     2.1       15 Oct 2021 08:22  Mg     2.2       14 Oct 2021 08:01  Mg     2.3       13 Oct 2021 07:57    TPro  6.6    /  Alb  2.4    /  TBili  0.7    /  DBili  x      /  AST  22     /  ALT  22     /  AlkPhos  137    13 Oct 2021 07:57      CARDIAC MARKERS ( 13 Oct 2021 13:57 )  .568 ng/mL / x     / 46 U/L / x     / 4.3 ng/mL    EXAM:  XR CHEST PORTABLE URGENT 1V                          PROCEDURE DATE:  10/11/2021      INTERPRETATION:  Portable chest radiograph    CLINICAL INFORMATION: Admission chest radiograph. Hematuria.    TECHNIQUE:  Portable  AP view of the chest.    COMPARISON: 2/21/2021 chest available for review.    FINDINGS:    The lungs are clear of airspace consolidations or effusions. No pneumothorax.    The  heart is enlarged in transverse diameter. No hilar mass.  Status post median sternotomy.     Visualized osseous structures are intact.    IMPRESSION:   No radiographic evidence of active chest disease.    --- End of Report ---    MARRY STEPHENS MD; Attending Radiologist  This document has been electronically signed. Oct 12 [id number]:04PM    Ventricular Rate 99 BPM    Atrial Rate 267 BPM    QRS Duration 104 ms    Q-T Interval 376 ms    QTC Calculation(Bazett) 482 ms    R Axis -54 degrees    T Axis 126 degrees    Diagnosis Line Atrial fibrillation  Left axis deviation  Inferior infarct , age undetermined  Anterior infarct (cited on or before 21-FEB-2021)  Abnormal ECG  When compared with ECG of 12-OCT-2021 03:46, (Unconfirmed)  QRS axis shifted left  Inferior infarct is now present  T wave inversion no longer evident in Inferior leads

## 2021-10-15 NOTE — PROGRESS NOTE ADULT - SUBJECTIVE AND OBJECTIVE BOX
Patient is a 86y old  Male who presents with a chief complaint of hematuria (15 Oct 2021 11:40)      INTERVAL HPI/OVERNIGHT EVENTS: Patient seen and examined. NAD. No complaints.    Vital Signs Last 24 Hrs  T(C): 36.6 (15 Oct 2021 10:46), Max: 37.4 (15 Oct 2021 05:12)  T(F): 97.9 (15 Oct 2021 10:46), Max: 99.4 (15 Oct 2021 05:12)  HR: 88 (15 Oct 2021 10:46) (86 - 91)  BP: 140/57 (15 Oct 2021 10:46) (115/54 - 140/57)  BP(mean): --  RR: 17 (15 Oct 2021 10:46) (16 - 17)  SpO2: 97% (15 Oct 2021 10:46) (92% - 97%)    10-15    138  |  105  |  19  ----------------------------<  53<LL>  3.7   |  26  |  1.50<H>    Ca    8.1<L>      15 Oct 2021 08:22  Mg     2.1     10-15                            8.3    10.43 )-----------( 265      ( 15 Oct 2021 08:22 )             25.2       CAPILLARY BLOOD GLUCOSE      POCT Blood Glucose.: 99 mg/dL (15 Oct 2021 11:31)  POCT Blood Glucose.: 72 mg/dL (15 Oct 2021 08:03)  POCT Blood Glucose.: 205 mg/dL (14 Oct 2021 21:10)  POCT Blood Glucose.: 260 mg/dL (14 Oct 2021 17:09)              acetaminophen   Tablet .. 650 milliGRAM(s) Oral every 6 hours PRN  aluminum hydroxide/magnesium hydroxide/simethicone Suspension 30 milliLiter(s) Oral every 4 hours PRN  aMIOdarone    Tablet 200 milliGRAM(s) Oral daily  cefTRIAXone   IVPB      cefTRIAXone   IVPB 1000 milliGRAM(s) IV Intermittent every 24 hours  dextrose 40% Gel 15 Gram(s) Oral once  dextrose 5%. 1000 milliLiter(s) IV Continuous <Continuous>  dextrose 5%. 1000 milliLiter(s) IV Continuous <Continuous>  dextrose 50% Injectable 25 Gram(s) IV Push once  dextrose 50% Injectable 12.5 Gram(s) IV Push once  dextrose 50% Injectable 25 Gram(s) IV Push once  glucagon  Injectable 1 milliGRAM(s) IntraMuscular once  influenza   Vaccine 0.5 milliLiter(s) IntraMuscular once  insulin glargine Injectable (LANTUS) 15 Unit(s) SubCutaneous at bedtime  insulin lispro (ADMELOG) corrective regimen sliding scale   SubCutaneous three times a day before meals  insulin lispro (ADMELOG) corrective regimen sliding scale   SubCutaneous at bedtime  insulin lispro Injectable (ADMELOG) 5 Unit(s) SubCutaneous three times a day before meals  melatonin 3 milliGRAM(s) Oral at bedtime PRN  metoprolol tartrate 12.5 milliGRAM(s) Oral every 12 hours  ondansetron Injectable 4 milliGRAM(s) IV Push every 8 hours PRN          REVIEW OF SYSTEMS:  CONSTITUTIONAL: No fever, no weight loss, or no fatigue  NECK: No pain, no stiffness  RESPIRATORY: No cough, no wheezing, no chills, no hemoptysis, No shortness of breath  CARDIOVASCULAR: No chest pain, no palpitations, no dizziness, no leg swelling  GASTROINTESTINAL: No abdominal pain. No nausea, no vomiting, no hematemesis; No diarrhea, no constipation. No melena, no hematochezia.  GENITOURINARY: No dysuria, no frequency, no hematuria, no incontinence  NEUROLOGICAL: No headaches, no loss of strength, no numbness, no tremors  SKIN: No itching, no burning  MUSCULOSKELETAL: No joint pain, no swelling; No muscle, no back, no extremity pain  PSYCHIATRIC: No depression, no mood swings,   HEME/LYMPH: No easy bruising, no bleeding gums  ALLERY AND IMMUNOLOGIC: No hives       Consultant(s) Notes Reviewed:  [X] YES  [ ] NO    PHYSICAL EXAM:  GENERAL: NAD  HEAD:  Atraumatic, Normocephalic  EYES: EOMI, PERRLA, conjunctiva and sclera clear  ENMT: No tonsillar erythema, exudates, or enlargement; Moist mucous membranes  NECK: Supple, No JVD  NERVOUS SYSTEM:  Awake & alert  CHEST/LUNG: Clear to auscultation bilaterally; No rales, rhonchi, wheezing,  HEART: Regular rate and rhythm  ABDOMEN: Soft, Nontender, Nondistended; Bowel sounds present  EXTREMITIES:  No clubbing, cyanosis, or edema  LYMPH: No lymphadenopathy noted  SKIN: No rashes      Advanced care planning discussed with patient/family [X] YES   [ ] NO    Advanced care planning discussed with patient/family. Patient's health status was discussed. All appropriate changes have been made regarding patient's end-of-life care. Advanced care planning forms reviewed/discussed/completed.  20 minutes spent.

## 2021-10-15 NOTE — PROVIDER CONTACT NOTE (CRITICAL VALUE NOTIFICATION) - ACTION/TREATMENT ORDERED:
Dr Shrestha aware and no action is indicated at this time as Pt had ACC after blood was drawn and was 72. Pt ate breakfast per Dr Shrestha
will evaluate
will order 2nd unit

## 2021-10-15 NOTE — PROGRESS NOTE ADULT - ASSESSMENT
85 yo male with PMH afib (on Eliquis, s/p BILLY cardioversion 7/2021), BPH, CAD (s/p stent 2005), Aortic valve replacement (2001) presents to ED hematuria found to be in Afib with RVR.     Chronic Afib  - S/P BILLY cardioversion 7/2021.  On home Eliquis.  On hold prior to his urology procedure (?TURP) on 10/1/21  - Presented with hematuria, now on CBI.  Continue to hold Eliquis for now and resume when cleared by Uro  - Rapid atrial fibrillation on admission likely from missed bb dose for the last few days, overnight AF 80bpm  - Continue BB.  On home Amio.  However, in the setting of persistent Afib, can eventually discontinue and uptitrate BB as BP allows.  Follows with Dr. Santillan  - Monitor electrolytes, replete to keep K>4 and Mag>2     CAD s/p stents/Elevated trops  - Trops 0.186. CKs are flat, suggesting against acute atherosclerotic plaque rupture.  - Patient is not complaining of any cardiac symptoms at this time.  - EKG showing mild ST wave depressions  - Mild demand ischemia likely in the setting of acute anemia , to fu with Dr santillan on discharge   -transfuse as per primary to keep hct > 26 given CAD   - Recent BILLY showed normal LVF, EF 60-65%, AVR, (bio), dilated LA, RVE with normal RVSF, severe SULMA, and severe TR  -BL LE edema worsening as per patient , on lasix 80mg at home has been held- would give dose lasix today if okay with renal       Torri Flores FNP-C  Cardiology NP  [facility] [id number] [phone]       85 yo male with PMH afib (on Eliquis, s/p BILLY cardioversion 7/2021), BPH, CAD (s/p stent 2005), Aortic valve replacement (2001) presents to ED hematuria found to be in Afib with RVR.     Chronic Afib  - S/P BILLY cardioversion 7/2021.  On home Eliquis.  On hold prior to his urology procedure (?TURP) on 10/1/21  - Presented with hematuria, now on CBI.  Continue to hold Eliquis for now and resume when cleared by Uro  - Rapid atrial fibrillation on admission likely from missed bb dose for the last few days, overnight AF 80bpm  - Continue BB.  On home Amio.  However, in the setting of persistent Afib is being used more as rate control.  Follows with Dr. Santillan  - Monitor electrolytes, replete to keep K>4 and Mag>2     CAD s/p stents/Elevated trops  - Trops 0.186. CKs are flat, suggesting against acute atherosclerotic plaque rupture.  - EKG showing mild ST wave depressions  - Mild demand ischemia likely in the setting of acute anemia , to fu with Dr santillan on discharge   -transfuse as per primary to keep hct > 26 given CAD   - Recent BILLY showed normal LVF, EF 60-65%, AVR, (bio), dilated LA, RVE with normal RVSF, severe SULMA, and severe TR  -BL LE edema worsening as per patient , on lasix 80mg at home has been held- would give dose lasix today if okay with renal       Torri Flores FNP-C  Cardiology NP  [facility] [phone]

## 2021-10-15 NOTE — PROGRESS NOTE ADULT - SUBJECTIVE AND OBJECTIVE BOX
Patient is a 86y old  Male who presents with a chief complaint of hematuria (13 Oct 2021 12:27)    Patient seen in follow up for RANDOLPH.        PAST MEDICAL HISTORY:  Diabetes mellitus      MEDICATIONS  (STANDING):  aMIOdarone    Tablet 200 milliGRAM(s) Oral daily  cefTRIAXone   IVPB      cefTRIAXone   IVPB 1000 milliGRAM(s) IV Intermittent every 24 hours  dextrose 40% Gel 15 Gram(s) Oral once  dextrose 5%. 1000 milliLiter(s) (50 mL/Hr) IV Continuous <Continuous>  dextrose 5%. 1000 milliLiter(s) (100 mL/Hr) IV Continuous <Continuous>  dextrose 50% Injectable 25 Gram(s) IV Push once  dextrose 50% Injectable 12.5 Gram(s) IV Push once  dextrose 50% Injectable 25 Gram(s) IV Push once  glucagon  Injectable 1 milliGRAM(s) IntraMuscular once  influenza   Vaccine 0.5 milliLiter(s) IntraMuscular once  insulin glargine Injectable (LANTUS) 15 Unit(s) SubCutaneous at bedtime  insulin lispro (ADMELOG) corrective regimen sliding scale   SubCutaneous three times a day before meals  insulin lispro (ADMELOG) corrective regimen sliding scale   SubCutaneous at bedtime  insulin lispro Injectable (ADMELOG) 5 Unit(s) SubCutaneous three times a day before meals  metoprolol tartrate 12.5 milliGRAM(s) Oral every 12 hours  sodium chloride 0.9%. 1000 milliLiter(s) (75 mL/Hr) IV Continuous <Continuous>    MEDICATIONS  (PRN):  acetaminophen   Tablet .. 650 milliGRAM(s) Oral every 6 hours PRN Temp greater or equal to 38C (100.4F), Mild Pain (1 - 3)  aluminum hydroxide/magnesium hydroxide/simethicone Suspension 30 milliLiter(s) Oral every 4 hours PRN Dyspepsia  melatonin 3 milliGRAM(s) Oral at bedtime PRN Insomnia  ondansetron Injectable 4 milliGRAM(s) IV Push every 8 hours PRN Nausea and/or Vomiting    T(C): 36.6 (10-15-21 @ 10:46), Max: 37.4 (10-15-21 @ 05:12)  HR: 88 (10-15-21 @ 10:46) (78 - 107)  BP: 140/57 (10-15-21 @ 10:46) (102/60 - 140/57)  RR: 17 (10-15-21 @ 10:46)  SpO2: 97% (10-15-21 @ 10:46)  Wt(kg): --  I&O's Detail    14 Oct 2021 07:01  -  15 Oct 2021 07:00  --------------------------------------------------------  IN:    Oral Fluid: 120 mL    sodium chloride 0.9%: 1500 mL  Total IN: 1620 mL    OUT:  Total OUT: 0 mL    Total NET: 1620 mL                  PHYSICAL EXAM:  General: No distress  Respiratory: b/l air entry  Cardiovascular: S1 S2  Gastrointestinal: soft  Extremities:  edema                          LABORATORY:                        8.3    10.43 )-----------( 265      ( 15 Oct 2021 08:22 )             25.2     10-15    138  |  105  |  19  ----------------------------<  53<LL>  3.7   |  26  |  1.50<H>    Ca    8.1<L>      15 Oct 2021 08:22  Mg     2.1     10-15      Sodium, Serum: 138 mmol/L (10-15 @ 08:22)  Sodium, Serum: 134 mmol/L (10-14 @ 08:01)    Potassium, Serum: 3.7 mmol/L (10-15 @ 08:22)  Potassium, Serum: 3.6 mmol/L (10-14 @ 08:01)    Hemoglobin: 8.3 g/dL (10-15 @ 08:22)  Hemoglobin: 7.2 g/dL (10-14 @ 08:01)  Hemoglobin: 8.1 g/dL (10-13 @ 07:57)  Hemoglobin: 8.1 g/dL (10-12 @ 16:28)    Creatinine, Serum 1.50 (10-15 @ 08:22)  Creatinine, Serum 1.60 (10-14 @ 08:01)  Creatinine, Serum 1.60 (10-13 @ 07:57)    CARDIAC MARKERS ( 13 Oct 2021 13:57 )  .568 ng/mL / x     / 46 U/L / x     / 4.3 ng/mL

## 2021-10-15 NOTE — PROGRESS NOTE ADULT - ASSESSMENT
? RANDOLPH Vs subacute: Crea 2.2 07/2021, Crea 1.3 02/2021  Hematuria, s/p recent TURP  A fib  Hypertension  Diabetes    Improving renal indices. Will d/c IVF. To continue current meds. Monitor blood sugar levels. Insulin coverage as needed. Dietary restriction.   Monitor h/h trend. Transfuse PRN. Monitor BP trend. Titrate BP meds as needed. Salt restriction. Cardiology follow up.   Will follow electrolytes and renal function trend.  follow up.

## 2021-10-15 NOTE — PROGRESS NOTE ADULT - SUBJECTIVE AND OBJECTIVE BOX
INTERVAL HPI/OVERNIGHT EVENTS: hematuria has improved on cbi    MEDICATIONS  (STANDING):  aMIOdarone    Tablet 200 milliGRAM(s) Oral daily  cefTRIAXone   IVPB      cefTRIAXone   IVPB 1000 milliGRAM(s) IV Intermittent every 24 hours  dextrose 40% Gel 15 Gram(s) Oral once  dextrose 5%. 1000 milliLiter(s) (50 mL/Hr) IV Continuous <Continuous>  dextrose 5%. 1000 milliLiter(s) (100 mL/Hr) IV Continuous <Continuous>  dextrose 50% Injectable 25 Gram(s) IV Push once  dextrose 50% Injectable 12.5 Gram(s) IV Push once  dextrose 50% Injectable 25 Gram(s) IV Push once  glucagon  Injectable 1 milliGRAM(s) IntraMuscular once  influenza   Vaccine 0.5 milliLiter(s) IntraMuscular once  insulin glargine Injectable (LANTUS) 15 Unit(s) SubCutaneous at bedtime  insulin lispro (ADMELOG) corrective regimen sliding scale   SubCutaneous three times a day before meals  insulin lispro (ADMELOG) corrective regimen sliding scale   SubCutaneous at bedtime  insulin lispro Injectable (ADMELOG) 5 Unit(s) SubCutaneous three times a day before meals  metoprolol tartrate 12.5 milliGRAM(s) Oral every 12 hours  sodium chloride 0.9%. 1000 milliLiter(s) (75 mL/Hr) IV Continuous <Continuous>    MEDICATIONS  (PRN):  acetaminophen   Tablet .. 650 milliGRAM(s) Oral every 6 hours PRN Temp greater or equal to 38C (100.4F), Mild Pain (1 - 3)  aluminum hydroxide/magnesium hydroxide/simethicone Suspension 30 milliLiter(s) Oral every 4 hours PRN Dyspepsia  melatonin 3 milliGRAM(s) Oral at bedtime PRN Insomnia  ondansetron Injectable 4 milliGRAM(s) IV Push every 8 hours PRN Nausea and/or Vomiting        Vital Signs Last 24 Hrs  T(C): 37.4 (15 Oct 2021 05:12), Max: 37.4 (15 Oct 2021 05:12)  T(F): 99.4 (15 Oct 2021 05:12), Max: 99.4 (15 Oct 2021 05:12)  HR: 86 (15 Oct 2021 05:12) (78 - 91)  BP: 126/52 (15 Oct 2021 05:12) (102/60 - 126/52)  BP(mean): --  RR: 17 (15 Oct 2021 05:12) (16 - 17)  SpO2: 92% (15 Oct 2021 05:12) (92% - 96%)    PHYSICAL EXAM:    ABDOMEN: soft, NT  GENITALIA: urine pink on cbi    LABS:                        7.2    12.15 )-----------( 262      ( 14 Oct 2021 08:01 )             22.0     10-14    134<L>  |  102  |  26<H>  ----------------------------<  190<H>  3.6   |  24  |  1.60<H>    Ca    8.1<L>      14 Oct 2021 08:01  Mg     2.2     10-14          Urine culture:  10-12 @ 05:30 --   No growth to date.  Urine culture:  10-12 @ 00:42 --   <10,000 CFU/mL Normal Urogenital Saida      RADIOLOGY & ADDITIONAL TESTS:

## 2021-10-16 LAB
ANION GAP SERPL CALC-SCNC: 7 MMOL/L — SIGNIFICANT CHANGE UP (ref 5–17)
BUN SERPL-MCNC: 17 MG/DL — SIGNIFICANT CHANGE UP (ref 7–23)
CALCIUM SERPL-MCNC: 7.6 MG/DL — LOW (ref 8.5–10.1)
CHLORIDE SERPL-SCNC: 100 MMOL/L — SIGNIFICANT CHANGE UP (ref 96–108)
CO2 SERPL-SCNC: 27 MMOL/L — SIGNIFICANT CHANGE UP (ref 22–31)
CREAT SERPL-MCNC: 1.4 MG/DL — HIGH (ref 0.5–1.3)
GLUCOSE SERPL-MCNC: 95 MG/DL — SIGNIFICANT CHANGE UP (ref 70–99)
HCT VFR BLD CALC: 25.8 % — LOW (ref 39–50)
HGB BLD-MCNC: 8.4 G/DL — LOW (ref 13–17)
MAGNESIUM SERPL-MCNC: 2 MG/DL — SIGNIFICANT CHANGE UP (ref 1.6–2.6)
MCHC RBC-ENTMCNC: 29.7 PG — SIGNIFICANT CHANGE UP (ref 27–34)
MCHC RBC-ENTMCNC: 32.6 GM/DL — SIGNIFICANT CHANGE UP (ref 32–36)
MCV RBC AUTO: 91.2 FL — SIGNIFICANT CHANGE UP (ref 80–100)
NRBC # BLD: 0 /100 WBCS — SIGNIFICANT CHANGE UP (ref 0–0)
PLATELET # BLD AUTO: 291 K/UL — SIGNIFICANT CHANGE UP (ref 150–400)
POTASSIUM SERPL-MCNC: 3.9 MMOL/L — SIGNIFICANT CHANGE UP (ref 3.5–5.3)
POTASSIUM SERPL-SCNC: 3.9 MMOL/L — SIGNIFICANT CHANGE UP (ref 3.5–5.3)
RBC # BLD: 2.83 M/UL — LOW (ref 4.2–5.8)
RBC # FLD: 15.2 % — HIGH (ref 10.3–14.5)
SODIUM SERPL-SCNC: 134 MMOL/L — LOW (ref 135–145)
WBC # BLD: 10.03 K/UL — SIGNIFICANT CHANGE UP (ref 3.8–10.5)
WBC # FLD AUTO: 10.03 K/UL — SIGNIFICANT CHANGE UP (ref 3.8–10.5)

## 2021-10-16 PROCEDURE — 99232 SBSQ HOSP IP/OBS MODERATE 35: CPT

## 2021-10-16 RX ORDER — AMINOCAPROIC ACID 500 MG/1
500 TABLET ORAL EVERY 6 HOURS
Refills: 0 | Status: DISCONTINUED | OUTPATIENT
Start: 2021-10-16 | End: 2021-10-20

## 2021-10-16 RX ORDER — INSULIN GLARGINE 100 [IU]/ML
8 INJECTION, SOLUTION SUBCUTANEOUS AT BEDTIME
Refills: 0 | Status: DISCONTINUED | OUTPATIENT
Start: 2021-10-16 | End: 2021-10-20

## 2021-10-16 RX ADMIN — CEFTRIAXONE 100 MILLIGRAM(S): 500 INJECTION, POWDER, FOR SOLUTION INTRAMUSCULAR; INTRAVENOUS at 23:40

## 2021-10-16 RX ADMIN — AMIODARONE HYDROCHLORIDE 200 MILLIGRAM(S): 400 TABLET ORAL at 05:13

## 2021-10-16 RX ADMIN — AMINOCAPROIC ACID 500 MILLIGRAM(S): 500 TABLET ORAL at 11:46

## 2021-10-16 RX ADMIN — Medication 12.5 MILLIGRAM(S): at 17:21

## 2021-10-16 RX ADMIN — AMINOCAPROIC ACID 500 MILLIGRAM(S): 500 TABLET ORAL at 17:21

## 2021-10-16 RX ADMIN — AMINOCAPROIC ACID 500 MILLIGRAM(S): 500 TABLET ORAL at 23:48

## 2021-10-16 RX ADMIN — Medication 12.5 MILLIGRAM(S): at 04:17

## 2021-10-16 RX ADMIN — Medication 3 MILLIGRAM(S): at 21:52

## 2021-10-16 NOTE — PROGRESS NOTE ADULT - ASSESSMENT
85 yo male with PMH afib (on Eliquis, s/p BILLY cardioversion 7/2021), BPH, CAD (s/p stent 2005), Aortic valve replacement (2001) presents to ED hematuria found to be in Afib with RVR.     Chronic Afib  - HR: 100 (10-16-21 @ 04:08) (88 - 104)  - S/P BILLY cardioversion 7/2021.  On home Eliquis.  On hold prior to his urology procedure (?TURP) on 10/1/21  - Presented with hematuria, now on CBI.  Continue to hold Eliquis for now and resume when cleared by Uro  - Rapid atrial fibrillation on admission likely from missed bb dose for the last few days, overnight AF 80bpm  - Continue BB.  On home Amio.  However, in the setting of persistent Afib is being used more as rate control.  Follows with Dr. Santillan  - Monitor electrolytes, replete to keep K>4 and Mag>2     CAD s/p stents/Elevated trops  - Trops 0.186. CKs are flat, suggesting against acute atherosclerotic plaque rupture.  - EKG showing mild ST wave depressions  - Mild demand ischemia likely in the setting of acute anemia , to fu with Dr santillan on discharge   -transfuse as per primary to keep hct > 26 given CAD   - Recent BILLY showed normal LVF, EF 60-65%, AVR, (bio), dilated LA, RVE with normal RVSF, severe SULMA, and severe TR  -BL LE edema worsening as per patient , on lasix 80mg at home has been held- would give dose lasix today if okay with renal       Vince Wilson DNP, ANP-c  Cardiology   [facility] #[id number] [phone]

## 2021-10-16 NOTE — PROGRESS NOTE ADULT - SUBJECTIVE AND OBJECTIVE BOX
CAPILLARY BLOOD GLUCOSE      POCT Blood Glucose.: 90 mg/dL (16 Oct 2021 11:50)  POCT Blood Glucose.: 82 mg/dL (16 Oct 2021 07:51)  POCT Blood Glucose.: 81 mg/dL (16 Oct 2021 02:49)  POCT Blood Glucose.: 73 mg/dL (16 Oct 2021 02:33)  POCT Blood Glucose.: 87 mg/dL (15 Oct 2021 22:33)  POCT Blood Glucose.: 115 mg/dL (15 Oct 2021 16:38)      Vital Signs Last 24 Hrs  T(C): 36.6 (16 Oct 2021 11:21), Max: 37.3 (15 Oct 2021 20:40)  T(F): 97.9 (16 Oct 2021 11:21), Max: 99.2 (15 Oct 2021 20:40)  HR: 92 (16 Oct 2021 11:21) (91 - 104)  BP: 113/61 (16 Oct 2021 11:21) (113/61 - 149/72)  BP(mean): --  RR: 18 (16 Oct 2021 11:21) (18 - 18)  SpO2: 92% (16 Oct 2021 11:21) (92% - 95%)    General: WN/WD NAD  Respiratory: CTA B/L  CV: RRR, S1S2, no murmurs, rubs or gallops  Abdominal: Soft, NT, ND +BS, Last BM  Extremities: No edema, + peripheral pulses     10-16    134<L>  |  100  |  17  ----------------------------<  95  3.9   |  27  |  1.40<H>    Ca    7.6<L>      16 Oct 2021 08:53  Mg     2.0     10-16        dextrose 40% Gel 15 Gram(s) Oral once  dextrose 50% Injectable 25 Gram(s) IV Push once  dextrose 50% Injectable 12.5 Gram(s) IV Push once  dextrose 50% Injectable 25 Gram(s) IV Push once  glucagon  Injectable 1 milliGRAM(s) IntraMuscular once  insulin glargine Injectable (LANTUS) 15 Unit(s) SubCutaneous at bedtime  insulin lispro (ADMELOG) corrective regimen sliding scale   SubCutaneous three times a day before meals  insulin lispro (ADMELOG) corrective regimen sliding scale   SubCutaneous at bedtime  insulin lispro Injectable (ADMELOG) 5 Unit(s) SubCutaneous three times a day before meals

## 2021-10-16 NOTE — PROGRESS NOTE ADULT - SUBJECTIVE AND OBJECTIVE BOX
No distress    Vital Signs Last 24 Hrs  T(C): 36.6 (10-16-21 @ 17:13), Max: 36.9 (10-16-21 @ 04:08)  T(F): 97.8 (10-16-21 @ 17:13), Max: 98.4 (10-16-21 @ 04:08)  HR: 117 (10-16-21 @ 17:13) (92 - 117)  BP: 127/60 (10-16-21 @ 17:13) (113/61 - 141/60)  RR: 18 (10-16-21 @ 17:13) (18 - 18)  SpO2: 94% (10-16-21 @ 17:13) (92% - 94%)      Respiratory: b/l air entry  Cardiovascular: S1 S2  Gastrointestinal: soft  Extremities:  edema                        8.4    10.03 )-----------( 291      ( 16 Oct 2021 08:53 )             25.8     16 Oct 2021 08:53    134    |  100    |  17     ----------------------------<  95     3.9     |  27     |  1.40     Ca    7.6        16 Oct 2021 08:53  Mg     2.0       16 Oct 2021 08:53    acetaminophen   Tablet .. 650 milliGRAM(s) Oral every 6 hours PRN  aluminum hydroxide/magnesium hydroxide/simethicone Suspension 30 milliLiter(s) Oral every 4 hours PRN  aminocaproic acid Tablet 500 milliGRAM(s) Oral every 6 hours  aMIOdarone    Tablet 200 milliGRAM(s) Oral daily  cefTRIAXone   IVPB 1000 milliGRAM(s) IV Intermittent every 24 hours  cefTRIAXone   IVPB      dextrose 40% Gel 15 Gram(s) Oral once  dextrose 5%. 1000 milliLiter(s) IV Continuous <Continuous>  dextrose 5%. 1000 milliLiter(s) IV Continuous <Continuous>  dextrose 50% Injectable 25 Gram(s) IV Push once  dextrose 50% Injectable 12.5 Gram(s) IV Push once  dextrose 50% Injectable 25 Gram(s) IV Push once  glucagon  Injectable 1 milliGRAM(s) IntraMuscular once  influenza   Vaccine 0.5 milliLiter(s) IntraMuscular once  insulin glargine Injectable (LANTUS) 8 Unit(s) SubCutaneous at bedtime  insulin lispro (ADMELOG) corrective regimen sliding scale   SubCutaneous three times a day before meals  insulin lispro (ADMELOG) corrective regimen sliding scale   SubCutaneous at bedtime  melatonin 3 milliGRAM(s) Oral at bedtime PRN  metoprolol tartrate 12.5 milliGRAM(s) Oral every 12 hours  ondansetron Injectable 4 milliGRAM(s) IV Push every 8 hours PRN  polyethylene glycol 3350 17 Gram(s) Oral daily PRN    Assessment and Plan:   	  RANDOLPH/CKD 3, improving  Hematuria   f/u appr  Avoid nephrotoxins  F/u BMP, UO off IVF  Will follow    [phone]

## 2021-10-16 NOTE — PROGRESS NOTE ADULT - SUBJECTIVE AND OBJECTIVE BOX
INTERVAL HPI/OVERNIGHT EVENTS: Hemturia slightly improved overnight on CBI.    MEDICATIONS  (STANDING):  aminocaproic acid Tablet 500 milliGRAM(s) Oral every 6 hours  aMIOdarone    Tablet 200 milliGRAM(s) Oral daily  cefTRIAXone   IVPB      cefTRIAXone   IVPB 1000 milliGRAM(s) IV Intermittent every 24 hours  dextrose 40% Gel 15 Gram(s) Oral once  dextrose 5%. 1000 milliLiter(s) (50 mL/Hr) IV Continuous <Continuous>  dextrose 5%. 1000 milliLiter(s) (100 mL/Hr) IV Continuous <Continuous>  dextrose 50% Injectable 25 Gram(s) IV Push once  dextrose 50% Injectable 12.5 Gram(s) IV Push once  dextrose 50% Injectable 25 Gram(s) IV Push once  glucagon  Injectable 1 milliGRAM(s) IntraMuscular once  influenza   Vaccine 0.5 milliLiter(s) IntraMuscular once  insulin glargine Injectable (LANTUS) 15 Unit(s) SubCutaneous at bedtime  insulin lispro (ADMELOG) corrective regimen sliding scale   SubCutaneous three times a day before meals  insulin lispro (ADMELOG) corrective regimen sliding scale   SubCutaneous at bedtime  insulin lispro Injectable (ADMELOG) 5 Unit(s) SubCutaneous three times a day before meals  metoprolol tartrate 12.5 milliGRAM(s) Oral every 12 hours    MEDICATIONS  (PRN):  acetaminophen   Tablet .. 650 milliGRAM(s) Oral every 6 hours PRN Temp greater or equal to 38C (100.4F), Mild Pain (1 - 3)  aluminum hydroxide/magnesium hydroxide/simethicone Suspension 30 milliLiter(s) Oral every 4 hours PRN Dyspepsia  melatonin 3 milliGRAM(s) Oral at bedtime PRN Insomnia  ondansetron Injectable 4 milliGRAM(s) IV Push every 8 hours PRN Nausea and/or Vomiting  polyethylene glycol 3350 17 Gram(s) Oral daily PRN Constipation        Vital Signs Last 24 Hrs  T(C): 36.9 (16 Oct 2021 04:08), Max: 37.3 (15 Oct 2021 20:40)  T(F): 98.4 (16 Oct 2021 04:08), Max: 99.2 (15 Oct 2021 20:40)  HR: 100 (16 Oct 2021 04:08) (88 - 104)  BP: 141/60 (16 Oct 2021 04:08) (118/55 - 149/72)  BP(mean): --  RR: 18 (16 Oct 2021 04:08) (17 - 18)  SpO2: 92% (16 Oct 2021 04:08) (92% - 97%)    PHYSICAL EXAM:    ABDOMEN: Soft. No SP tenderness or distention  GENITALIA: Vinson draining well. Urine punch colored on CBI.    LABS:                        8.3    10.43 )-----------( 265      ( 15 Oct 2021 08:22 )             25.2     10-15    138  |  105  |  19  ----------------------------<  53<LL>  3.7   |  26  |  1.50<H>    Ca    8.1<L>      15 Oct 2021 08:22  Mg     2.1     10-15

## 2021-10-16 NOTE — PROGRESS NOTE ADULT - SUBJECTIVE AND OBJECTIVE BOX
[facility] -- Ruben Matthews, Betsy, Jose, Rony Dennis Savella  Office # [phone]      Follow Up:    Afib CAD  Subjective/Observations:   Interim events noted. Now resting comfortably in bed.  No complaints of chest pain, dyspnea, or palpitations reported. No signs of orthopnea or PND.     REVIEW OF SYSTEMS: All other review of systems is negative unless indicated above    PAST MEDICAL & SURGICAL HISTORY:  Diabetes mellitus    History of aortic valve replacement    CAD (coronary artery disease)  cardiac stent        MEDICATIONS  (STANDING):  aminocaproic acid Tablet 500 milliGRAM(s) Oral every 6 hours  aMIOdarone    Tablet 200 milliGRAM(s) Oral daily  cefTRIAXone   IVPB      cefTRIAXone   IVPB 1000 milliGRAM(s) IV Intermittent every 24 hours  dextrose 40% Gel 15 Gram(s) Oral once  dextrose 5%. 1000 milliLiter(s) (50 mL/Hr) IV Continuous <Continuous>  dextrose 5%. 1000 milliLiter(s) (100 mL/Hr) IV Continuous <Continuous>  dextrose 50% Injectable 25 Gram(s) IV Push once  dextrose 50% Injectable 12.5 Gram(s) IV Push once  dextrose 50% Injectable 25 Gram(s) IV Push once  glucagon  Injectable 1 milliGRAM(s) IntraMuscular once  influenza   Vaccine 0.5 milliLiter(s) IntraMuscular once  insulin glargine Injectable (LANTUS) 15 Unit(s) SubCutaneous at bedtime  insulin lispro (ADMELOG) corrective regimen sliding scale   SubCutaneous three times a day before meals  insulin lispro (ADMELOG) corrective regimen sliding scale   SubCutaneous at bedtime  insulin lispro Injectable (ADMELOG) 5 Unit(s) SubCutaneous three times a day before meals  metoprolol tartrate 12.5 milliGRAM(s) Oral every 12 hours    MEDICATIONS  (PRN):  acetaminophen   Tablet .. 650 milliGRAM(s) Oral every 6 hours PRN Temp greater or equal to 38C (100.4F), Mild Pain (1 - 3)  aluminum hydroxide/magnesium hydroxide/simethicone Suspension 30 milliLiter(s) Oral every 4 hours PRN Dyspepsia  melatonin 3 milliGRAM(s) Oral at bedtime PRN Insomnia  ondansetron Injectable 4 milliGRAM(s) IV Push every 8 hours PRN Nausea and/or Vomiting  polyethylene glycol 3350 17 Gram(s) Oral daily PRN Constipation      Allergies    No Known Allergies    Intolerances        Vital Signs Last 24 Hrs  T(C): 36.9 (16 Oct 2021 04:08), Max: 37.3 (15 Oct 2021 20:40)  T(F): 98.4 (16 Oct 2021 04:08), Max: 99.2 (15 Oct 2021 20:40)  HR: 100 (16 Oct 2021 04:08) (88 - 104)  BP: 141/60 (16 Oct 2021 04:08) (118/55 - 149/72)  BP(mean): --  RR: 18 (16 Oct 2021 04:08) (17 - 18)  SpO2: 92% (16 Oct 2021 04:08) (92% - 97%)    I&O's Summary        PHYSICAL EXAM:  TELE: Off tele   Constitutional: NAD, awake and alert, well-developed  HEENT: Moist Mucous Membranes, Anicteric  Pulmonary: Non-labored, breath sounds are clear bilaterally, No wheezing, crackles or rhonchi  Cardiovascular: Regular, S1 and S2 nl, + murmur No rubs, gallops or clicks   Gastrointestinal: Bowel Sounds present, soft, nontender.   Lymph: No lymphadenopathy. + Bilat LE peripheral edema.   Skin: No visible rashes or ulcers.  Psych:  Mood & affect appropriate    LABS: All Labs Reviewed:                        8.4    10.03 )-----------( 291      ( 16 Oct 2021 08:53 )             25.8                         8.3    10.43 )-----------( 265      ( 15 Oct 2021 08:22 )             25.2                         7.2    12.15 )-----------( 262      ( 14 Oct 2021 08:01 )             22.0     16 Oct 2021 08:53    134    |  100    |  17     ----------------------------<  95     3.9     |  27     |  1.40   15 Oct 2021 08:22    138    |  105    |  19     ----------------------------<  53     3.7     |  26     |  1.50   14 Oct 2021 08:01    134    |  102    |  26     ----------------------------<  190    3.6     |  24     |  1.60     Ca    7.6        16 Oct 2021 08:53  Ca    8.1        15 Oct 2021 08:22  Ca    8.1        14 Oct 2021 08:01  Mg     2.1       15 Oct 2021 08:22  Mg     2.2       14 Oct 2021 08:01    CARDIAC MARKERS ( 13 Oct 2021 13:57 )  .568 ng/mL / x     / 46 U/L / x     / 4.3 ng/mL    EXAM:  XR CHEST PORTABLE URGENT 1V                          PROCEDURE DATE:  10/11/2021      INTERPRETATION:  Portable chest radiograph    CLINICAL INFORMATION: Admission chest radiograph. Hematuria.    TECHNIQUE:  Portable  AP view of the chest.    COMPARISON: 2/21/2021 chest available for review.    FINDINGS:    The lungs are clear of airspace consolidations or effusions. No pneumothorax.    The  heart is enlarged in transverse diameter. No hilar mass.  Status post median sternotomy.     Visualized osseous structures are intact.    IMPRESSION:   No radiographic evidence of active chest disease.    --- End of Report ---    MARRY STEPHENS MD; Attending Radiologist  This document has been electronically signed. Oct 12 32105:04PM    Ventricular Rate 99 BPM    Atrial Rate 267 BPM    QRS Duration 104 ms    Q-T Interval 376 ms    QTC Calculation(Bazett) 482 ms    R Axis -54 degrees    T Axis 126 degrees    Diagnosis Line Atrial fibrillation  Left axis deviation  Inferior infarct , age undetermined  Anterior infarct (cited on or before 21-FEB-2021)  Abnormal ECG  When compared with ECG of 12-OCT-2021 03:46, (Unconfirmed)  QRS axis shifted left  Inferior infarct is now present  T wave inversion no longer evident in Inferior leads

## 2021-10-17 LAB
CULTURE RESULTS: SIGNIFICANT CHANGE UP
CULTURE RESULTS: SIGNIFICANT CHANGE UP
SPECIMEN SOURCE: SIGNIFICANT CHANGE UP
SPECIMEN SOURCE: SIGNIFICANT CHANGE UP

## 2021-10-17 PROCEDURE — 99232 SBSQ HOSP IP/OBS MODERATE 35: CPT

## 2021-10-17 RX ADMIN — Medication 12.5 MILLIGRAM(S): at 06:34

## 2021-10-17 RX ADMIN — AMINOCAPROIC ACID 500 MILLIGRAM(S): 500 TABLET ORAL at 11:49

## 2021-10-17 RX ADMIN — Medication 650 MILLIGRAM(S): at 22:00

## 2021-10-17 RX ADMIN — AMINOCAPROIC ACID 500 MILLIGRAM(S): 500 TABLET ORAL at 06:34

## 2021-10-17 RX ADMIN — Medication 650 MILLIGRAM(S): at 21:11

## 2021-10-17 RX ADMIN — Medication 3 MILLIGRAM(S): at 22:51

## 2021-10-17 RX ADMIN — AMINOCAPROIC ACID 500 MILLIGRAM(S): 500 TABLET ORAL at 17:29

## 2021-10-17 RX ADMIN — AMINOCAPROIC ACID 500 MILLIGRAM(S): 500 TABLET ORAL at 22:52

## 2021-10-17 RX ADMIN — Medication 2: at 17:28

## 2021-10-17 RX ADMIN — Medication 12.5 MILLIGRAM(S): at 17:29

## 2021-10-17 RX ADMIN — CEFTRIAXONE 100 MILLIGRAM(S): 500 INJECTION, POWDER, FOR SOLUTION INTRAMUSCULAR; INTRAVENOUS at 22:52

## 2021-10-17 NOTE — PROGRESS NOTE ADULT - SUBJECTIVE AND OBJECTIVE BOX
[facility] -- Ruben Matthews, Betsy, Jose, Sonny, Ijeoma Uribe  Office # [phone]      Follow Up:    afib cad  Subjective/Observations:   No events overnight resting comfortably in bed.  No complaints of chest pain, dyspnea, or palpitations reported. No signs of orthopnea or PND.  renaubs ib CBI     REVIEW OF SYSTEMS: All other review of systems is negative unless indicated above    PAST MEDICAL & SURGICAL HISTORY:  Diabetes mellitus    History of aortic valve replacement    CAD (coronary artery disease)  cardiac stent        MEDICATIONS  (STANDING):  aminocaproic acid Tablet 500 milliGRAM(s) Oral every 6 hours  aMIOdarone    Tablet 200 milliGRAM(s) Oral daily  cefTRIAXone   IVPB      cefTRIAXone   IVPB 1000 milliGRAM(s) IV Intermittent every 24 hours  dextrose 40% Gel 15 Gram(s) Oral once  dextrose 5%. 1000 milliLiter(s) (50 mL/Hr) IV Continuous <Continuous>  dextrose 5%. 1000 milliLiter(s) (100 mL/Hr) IV Continuous <Continuous>  dextrose 50% Injectable 25 Gram(s) IV Push once  dextrose 50% Injectable 12.5 Gram(s) IV Push once  dextrose 50% Injectable 25 Gram(s) IV Push once  glucagon  Injectable 1 milliGRAM(s) IntraMuscular once  influenza   Vaccine 0.5 milliLiter(s) IntraMuscular once  insulin glargine Injectable (LANTUS) 8 Unit(s) SubCutaneous at bedtime  insulin lispro (ADMELOG) corrective regimen sliding scale   SubCutaneous three times a day before meals  insulin lispro (ADMELOG) corrective regimen sliding scale   SubCutaneous at bedtime  metoprolol tartrate 12.5 milliGRAM(s) Oral every 12 hours    MEDICATIONS  (PRN):  acetaminophen   Tablet .. 650 milliGRAM(s) Oral every 6 hours PRN Temp greater or equal to 38C (100.4F), Mild Pain (1 - 3)  aluminum hydroxide/magnesium hydroxide/simethicone Suspension 30 milliLiter(s) Oral every 4 hours PRN Dyspepsia  melatonin 3 milliGRAM(s) Oral at bedtime PRN Insomnia  ondansetron Injectable 4 milliGRAM(s) IV Push every 8 hours PRN Nausea and/or Vomiting  polyethylene glycol 3350 17 Gram(s) Oral daily PRN Constipation      Allergies    No Known Allergies    Intolerances        Vital Signs Last 24 Hrs  T(C): 36.7 (17 Oct 2021 04:36), Max: 36.7 (17 Oct 2021 04:36)  T(F): 98 (17 Oct 2021 04:36), Max: 98 (17 Oct 2021 04:36)  HR: 79 (17 Oct 2021 04:36) (79 - 117)  BP: 100/72 (17 Oct 2021 04:36) (100/72 - 127/60)  BP(mean): --  RR: 18 (17 Oct 2021 04:36) (18 - 18)  SpO2: 94% (17 Oct 2021 04:36) (92% - 94%)    I&O's Summary        PHYSICAL EXAM:  TELE: af  Constitutional: NAD, awake and alert, well-developed  HEENT: Moist Mucous Membranes, Anicteric  Pulmonary: Non-labored, breath sounds are clear bilaterally, No wheezing, crackles or rhonchi  Cardiovascular: IRRegular, S1 and S2 nl, murmur   Gastrointestinal: Bowel Sounds present, soft, nontender. CBI  Lymph: + peripheral edema.  Skin: No visible rashes or ulcers.  Psych:  Mood & affect appropriate    LABS: All Labs Reviewed:                        8.4    10.03 )-----------( 291      ( 16 Oct 2021 08:53 )             25.8                         8.3    10.43 )-----------( 265      ( 15 Oct 2021 08:22 )             25.2     16 Oct 2021 08:53    134    |  100    |  17     ----------------------------<  95     3.9     |  27     |  1.40   15 Oct 2021 08:22    138    |  105    |  19     ----------------------------<  53     3.7     |  26     |  1.50     Ca    7.6        16 Oct 2021 08:53  Ca    8.1        15 Oct 2021 08:22  Mg     2.0       16 Oct 2021 08:53  Mg     2.1       15 Oct 2021 08:22      EXAM:  XR CHEST PORTABLE URGENT 1V                          PROCEDURE DATE:  10/11/2021      INTERPRETATION:  Portable chest radiograph    CLINICAL INFORMATION: Admission chest radiograph. Hematuria.    TECHNIQUE:  Portable  AP view of the chest.    COMPARISON: 2/21/2021 chest available for review.    FINDINGS:    The lungs are clear of airspace consolidations or effusions. No pneumothorax.    The  heart is enlarged in transverse diameter. No hilar mass.  Status post median sternotomy.     Visualized osseous structures are intact.    IMPRESSION:   No radiographic evidence of active chest disease.    --- End of Report ---    MARRY STEPHENS MD; Attending Radiologist  This document has been electronically signed. Oct 12 51541:04PM    Ventricular Rate 99 BPM    Atrial Rate 267 BPM    QRS Duration 104 ms    Q-T Interval 376 ms    QTC Calculation(Bazett) 482 ms    R Axis -54 degrees    T Axis 126 degrees    Diagnosis Line Atrial fibrillation  Left axis deviation  Inferior infarct , age undetermined  Anterior infarct (cited on or before 21-FEB-2021)  Abnormal ECG  When compared with ECG of 12-OCT-2021 03:46, (Unconfirmed)  QRS axis shifted left  Inferior infarct is now present  T wave inversion no longer evident in Inferior leads

## 2021-10-17 NOTE — PROGRESS NOTE ADULT - SUBJECTIVE AND OBJECTIVE BOX
INTERVAL HPI/OVERNIGHT EVENTS: Urine blood tinged overnight on CBI.    MEDICATIONS  (STANDING):  aminocaproic acid Tablet 500 milliGRAM(s) Oral every 6 hours  aMIOdarone    Tablet 200 milliGRAM(s) Oral daily  cefTRIAXone   IVPB      cefTRIAXone   IVPB 1000 milliGRAM(s) IV Intermittent every 24 hours  dextrose 40% Gel 15 Gram(s) Oral once  dextrose 5%. 1000 milliLiter(s) (50 mL/Hr) IV Continuous <Continuous>  dextrose 5%. 1000 milliLiter(s) (100 mL/Hr) IV Continuous <Continuous>  dextrose 50% Injectable 25 Gram(s) IV Push once  dextrose 50% Injectable 12.5 Gram(s) IV Push once  dextrose 50% Injectable 25 Gram(s) IV Push once  glucagon  Injectable 1 milliGRAM(s) IntraMuscular once  influenza   Vaccine 0.5 milliLiter(s) IntraMuscular once  insulin glargine Injectable (LANTUS) 8 Unit(s) SubCutaneous at bedtime  insulin lispro (ADMELOG) corrective regimen sliding scale   SubCutaneous three times a day before meals  insulin lispro (ADMELOG) corrective regimen sliding scale   SubCutaneous at bedtime  metoprolol tartrate 12.5 milliGRAM(s) Oral every 12 hours    MEDICATIONS  (PRN):  acetaminophen   Tablet .. 650 milliGRAM(s) Oral every 6 hours PRN Temp greater or equal to 38C (100.4F), Mild Pain (1 - 3)  aluminum hydroxide/magnesium hydroxide/simethicone Suspension 30 milliLiter(s) Oral every 4 hours PRN Dyspepsia  melatonin 3 milliGRAM(s) Oral at bedtime PRN Insomnia  ondansetron Injectable 4 milliGRAM(s) IV Push every 8 hours PRN Nausea and/or Vomiting  polyethylene glycol 3350 17 Gram(s) Oral daily PRN Constipation        Vital Signs Last 24 Hrs  T(C): 36.7 (17 Oct 2021 04:36), Max: 36.7 (17 Oct 2021 04:36)  T(F): 98 (17 Oct 2021 04:36), Max: 98 (17 Oct 2021 04:36)  HR: 79 (17 Oct 2021 04:36) (79 - 117)  BP: 100/72 (17 Oct 2021 04:36) (100/72 - 127/60)  BP(mean): --  RR: 18 (17 Oct 2021 04:36) (18 - 18)  SpO2: 94% (17 Oct 2021 04:36) (92% - 94%)    PHYSICAL EXAM:    ABDOMEN: Soft. Non tender.  GENITALIA: Vinson draining well. Urine blood tinged    LABS:                        8.4    10.03 )-----------( 291      ( 16 Oct 2021 08:53 )             25.8     10-16    134<L>  |  100  |  17  ----------------------------<  95  3.9   |  27  |  1.40<H>    Ca    7.6<L>      16 Oct 2021 08:53  Mg     2.0     10-16

## 2021-10-17 NOTE — PROGRESS NOTE ADULT - SUBJECTIVE AND OBJECTIVE BOX
Patient is a 86y old  Male who presents with a chief complaint of hematuria (15 Oct 2021 11:40)      INTERVAL HPI/OVERNIGHT EVENTS: Patient seen and examined. NAD. No complaints.    Vital Signs Last 24 Hrs  T(C): 36.7 (17 Oct 2021 04:36), Max: 36.7 (17 Oct 2021 04:36)  T(F): 98 (17 Oct 2021 04:36), Max: 98 (17 Oct 2021 04:36)  HR: 79 (17 Oct 2021 04:36) (79 - 117)  BP: 100/72 (17 Oct 2021 04:36) (100/72 - 127/60)  BP(mean): --  RR: 18 (17 Oct 2021 04:36) (18 - 18)  SpO2: 94% (17 Oct 2021 04:36) (92% - 94%)    10-16    134<L>  |  100  |  17  ----------------------------<  95  3.9   |  27  |  1.40<H>    Ca    7.6<L>      16 Oct 2021 08:53  Mg     2.0     10-16                            8.4    10.03 )-----------( 291      ( 16 Oct 2021 08:53 )             25.8       CAPILLARY BLOOD GLUCOSE      POCT Blood Glucose.: 99 mg/dL (17 Oct 2021 08:01)  POCT Blood Glucose.: 140 mg/dL (16 Oct 2021 21:47)  POCT Blood Glucose.: 99 mg/dL (16 Oct 2021 17:00)  POCT Blood Glucose.: 90 mg/dL (16 Oct 2021 11:50)              acetaminophen   Tablet .. 650 milliGRAM(s) Oral every 6 hours PRN  aluminum hydroxide/magnesium hydroxide/simethicone Suspension 30 milliLiter(s) Oral every 4 hours PRN  aminocaproic acid Tablet 500 milliGRAM(s) Oral every 6 hours  aMIOdarone    Tablet 200 milliGRAM(s) Oral daily  cefTRIAXone   IVPB 1000 milliGRAM(s) IV Intermittent every 24 hours  cefTRIAXone   IVPB      dextrose 40% Gel 15 Gram(s) Oral once  dextrose 5%. 1000 milliLiter(s) IV Continuous <Continuous>  dextrose 5%. 1000 milliLiter(s) IV Continuous <Continuous>  dextrose 50% Injectable 25 Gram(s) IV Push once  dextrose 50% Injectable 12.5 Gram(s) IV Push once  dextrose 50% Injectable 25 Gram(s) IV Push once  glucagon  Injectable 1 milliGRAM(s) IntraMuscular once  influenza   Vaccine 0.5 milliLiter(s) IntraMuscular once  insulin glargine Injectable (LANTUS) 8 Unit(s) SubCutaneous at bedtime  insulin lispro (ADMELOG) corrective regimen sliding scale   SubCutaneous three times a day before meals  insulin lispro (ADMELOG) corrective regimen sliding scale   SubCutaneous at bedtime  melatonin 3 milliGRAM(s) Oral at bedtime PRN  metoprolol tartrate 12.5 milliGRAM(s) Oral every 12 hours  ondansetron Injectable 4 milliGRAM(s) IV Push every 8 hours PRN  polyethylene glycol 3350 17 Gram(s) Oral daily PRN        REVIEW OF SYSTEMS:  CONSTITUTIONAL: No fever, no weight loss, or no fatigue  NECK: No pain, no stiffness  RESPIRATORY: No cough, no wheezing, no chills, no hemoptysis, No shortness of breath  CARDIOVASCULAR: No chest pain, no palpitations, no dizziness, no leg swelling  GASTROINTESTINAL: No abdominal pain. No nausea, no vomiting, no hematemesis; No diarrhea, no constipation. No melena, no hematochezia.  GENITOURINARY: No dysuria, no frequency, no hematuria, no incontinence  NEUROLOGICAL: No headaches, no loss of strength, no numbness, no tremors  SKIN: No itching, no burning  MUSCULOSKELETAL: No joint pain, no swelling; No muscle, no back, no extremity pain  PSYCHIATRIC: No depression, no mood swings,   HEME/LYMPH: No easy bruising, no bleeding gums  ALLERY AND IMMUNOLOGIC: No hives       Consultant(s) Notes Reviewed:  [X] YES  [ ] NO    PHYSICAL EXAM:  GENERAL: NAD  HEAD:  Atraumatic, Normocephalic  EYES: EOMI, PERRLA, conjunctiva and sclera clear  ENMT: No tonsillar erythema, exudates, or enlargement; Moist mucous membranes  NECK: Supple, No JVD  NERVOUS SYSTEM:  Awake & alert  CHEST/LUNG: Clear to auscultation bilaterally; No rales, rhonchi, wheezing,  HEART: Regular rate and rhythm  ABDOMEN: Soft, Nontender, Nondistended; Bowel sounds present  EXTREMITIES:  No clubbing, cyanosis, or edema  LYMPH: No lymphadenopathy noted  SKIN: No rashes      Advanced care planning discussed with patient/family [X] YES   [ ] NO    Advanced care planning discussed with patient/family. Patient's health status was discussed. All appropriate changes have been made regarding patient's end-of-life care. Advanced care planning forms reviewed/discussed/completed.  20 minutes spent.

## 2021-10-18 LAB
ANION GAP SERPL CALC-SCNC: 6 MMOL/L — SIGNIFICANT CHANGE UP (ref 5–17)
BUN SERPL-MCNC: 22 MG/DL — SIGNIFICANT CHANGE UP (ref 7–23)
CALCIUM SERPL-MCNC: 8 MG/DL — LOW (ref 8.5–10.1)
CHLORIDE SERPL-SCNC: 99 MMOL/L — SIGNIFICANT CHANGE UP (ref 96–108)
CO2 SERPL-SCNC: 27 MMOL/L — SIGNIFICANT CHANGE UP (ref 22–31)
CREAT SERPL-MCNC: 1.5 MG/DL — HIGH (ref 0.5–1.3)
GLUCOSE SERPL-MCNC: 132 MG/DL — HIGH (ref 70–99)
HCT VFR BLD CALC: 25.6 % — LOW (ref 39–50)
HGB BLD-MCNC: 8.4 G/DL — LOW (ref 13–17)
MAGNESIUM SERPL-MCNC: 2 MG/DL — SIGNIFICANT CHANGE UP (ref 1.6–2.6)
MCHC RBC-ENTMCNC: 29.5 PG — SIGNIFICANT CHANGE UP (ref 27–34)
MCHC RBC-ENTMCNC: 32.8 GM/DL — SIGNIFICANT CHANGE UP (ref 32–36)
MCV RBC AUTO: 89.8 FL — SIGNIFICANT CHANGE UP (ref 80–100)
NRBC # BLD: 0 /100 WBCS — SIGNIFICANT CHANGE UP (ref 0–0)
PLATELET # BLD AUTO: 261 K/UL — SIGNIFICANT CHANGE UP (ref 150–400)
POTASSIUM SERPL-MCNC: 4.2 MMOL/L — SIGNIFICANT CHANGE UP (ref 3.5–5.3)
POTASSIUM SERPL-SCNC: 4.2 MMOL/L — SIGNIFICANT CHANGE UP (ref 3.5–5.3)
RBC # BLD: 2.85 M/UL — LOW (ref 4.2–5.8)
RBC # FLD: 14.7 % — HIGH (ref 10.3–14.5)
SARS-COV-2 RNA SPEC QL NAA+PROBE: SIGNIFICANT CHANGE UP
SODIUM SERPL-SCNC: 132 MMOL/L — LOW (ref 135–145)
WBC # BLD: 7.6 K/UL — SIGNIFICANT CHANGE UP (ref 3.8–10.5)
WBC # FLD AUTO: 7.6 K/UL — SIGNIFICANT CHANGE UP (ref 3.8–10.5)

## 2021-10-18 PROCEDURE — 99232 SBSQ HOSP IP/OBS MODERATE 35: CPT

## 2021-10-18 RX ORDER — LIDOCAINE 4 G/100G
1 CREAM TOPICAL ONCE
Refills: 0 | Status: COMPLETED | OUTPATIENT
Start: 2021-10-18 | End: 2021-10-18

## 2021-10-18 RX ADMIN — AMIODARONE HYDROCHLORIDE 200 MILLIGRAM(S): 400 TABLET ORAL at 06:13

## 2021-10-18 RX ADMIN — AMINOCAPROIC ACID 500 MILLIGRAM(S): 500 TABLET ORAL at 06:13

## 2021-10-18 RX ADMIN — AMINOCAPROIC ACID 500 MILLIGRAM(S): 500 TABLET ORAL at 23:06

## 2021-10-18 RX ADMIN — Medication 650 MILLIGRAM(S): at 22:00

## 2021-10-18 RX ADMIN — Medication 12.5 MILLIGRAM(S): at 06:13

## 2021-10-18 RX ADMIN — AMINOCAPROIC ACID 500 MILLIGRAM(S): 500 TABLET ORAL at 11:15

## 2021-10-18 RX ADMIN — CEFTRIAXONE 100 MILLIGRAM(S): 500 INJECTION, POWDER, FOR SOLUTION INTRAMUSCULAR; INTRAVENOUS at 23:06

## 2021-10-18 RX ADMIN — Medication 650 MILLIGRAM(S): at 21:10

## 2021-10-18 RX ADMIN — Medication 12.5 MILLIGRAM(S): at 17:11

## 2021-10-18 RX ADMIN — Medication 3 MILLIGRAM(S): at 21:10

## 2021-10-18 RX ADMIN — LIDOCAINE 1 APPLICATION(S): 4 CREAM TOPICAL at 21:10

## 2021-10-18 RX ADMIN — Medication 4: at 12:16

## 2021-10-18 RX ADMIN — INSULIN GLARGINE 8 UNIT(S): 100 INJECTION, SOLUTION SUBCUTANEOUS at 21:29

## 2021-10-18 RX ADMIN — AMINOCAPROIC ACID 500 MILLIGRAM(S): 500 TABLET ORAL at 17:11

## 2021-10-18 NOTE — PROGRESS NOTE ADULT - ASSESSMENT
RANDOLPH on CKD 3: Crea 2.2 07/2021, Crea 1.3 02/2021  Hematuria, s/p recent TURP  A fib  Hypertension  Diabetes    Improved and stable renal indices. On CBI. To continue current meds. Monitor blood sugar levels. Insulin coverage as needed. Dietary restriction.   Monitor h/h trend. Transfuse PRN. Monitor BP trend. Titrate BP meds as needed. Salt restriction. Will follow electrolytes and renal function trend.  follow up.

## 2021-10-18 NOTE — PROGRESS NOTE ADULT - ASSESSMENT
87 yo male with PMH afib (on Eliquis, s/p BILLY cardioversion 7/2021), BPH, CAD (s/p stent 2005), Aortic valve replacement (2001) presents to ED hematuria found to be in Afib with RVR.     Chronic Afib  - S/P BILLY cardioversion 7/2021.  On home Eliquis.  On hold prior to his urology procedure (?TURP) on 10/1/21  - Presented with hematuria, now on CBI.  Continue to hold Eliquis for now and resume when cleared by Uro  - Rapid atrial fibrillation on admission likely from missed BB dose for the last few days, overnight AF no events   - Continue BB.  On home Amio.  However, in the setting of persistent Afib is being used more as rate control.  Follows with Dr. Miranda  - Monitor electrolytes, replete to keep K>4 and Mag>2   - Tele AFib with 5 beats NSVT, increase BB as tolerated (home dose Metoprolol Succinate 50 mg QD)    CAD s/p stents/Elevated trops  - Trops 0.186. CKs are flat, suggesting against acute atherosclerotic plaque rupture.  - EKG showing mild ST wave depressions  - Mild demand ischemia likely in the setting of acute anemia , to fu with Dr Miranda on discharge   - Transfuse as per primary to keep HCT > 26 given CAD   - Recent BILLY showed normal LVF, EF 60-65%, AVR, (bio), dilated LA, RVE with normal RVSF, severe SULMA, and severe TR  - B/L LE edema worsening as per patient , on Lasix 80mg at home has been held- resume home lasix   - Continue BB- increase to Lopressor 25 mg BID with parameters    HTN  - BP: 110/64 (10-18-21 @ 04:35) (110/64 - 137/74)  - Continue BB titrate up as tolerated to home dose    RANDOLPH on CKD  - Creatinine trend improving:  <--1.50,  <--1.40,  <--1.50,   <--1.60,  <--2.00  - Resume home Lasix   - Monitor hyponatremia 132<--134  - Renal following    Hematuria  - CBI per urology  - Amicar per uro  - transfuse as needed    - Monitor and replete lytes, keep K>4, Mg>2.  - All other medical needs as per primary team.  - Other cardiovascular workup will depend on clinical course.  - Will continue to follow.    Kandice Hollis, MS ANP, [facility]  Nurse Practitioner- Cardiology   Spectra #[phone]

## 2021-10-18 NOTE — PROGRESS NOTE ADULT - SUBJECTIVE AND OBJECTIVE BOX
INTERVAL HPI/OVERNIGHT EVENTS: cbi restarted this am, Urine is pink on slow cbi today, h/h stable    MEDICATIONS  (STANDING):  aminocaproic acid Tablet 500 milliGRAM(s) Oral every 6 hours  aMIOdarone    Tablet 200 milliGRAM(s) Oral daily  cefTRIAXone   IVPB      cefTRIAXone   IVPB 1000 milliGRAM(s) IV Intermittent every 24 hours  dextrose 40% Gel 15 Gram(s) Oral once  dextrose 5%. 1000 milliLiter(s) (50 mL/Hr) IV Continuous <Continuous>  dextrose 5%. 1000 milliLiter(s) (100 mL/Hr) IV Continuous <Continuous>  dextrose 50% Injectable 25 Gram(s) IV Push once  dextrose 50% Injectable 12.5 Gram(s) IV Push once  dextrose 50% Injectable 25 Gram(s) IV Push once  glucagon  Injectable 1 milliGRAM(s) IntraMuscular once  influenza   Vaccine 0.5 milliLiter(s) IntraMuscular once  insulin glargine Injectable (LANTUS) 8 Unit(s) SubCutaneous at bedtime  insulin lispro (ADMELOG) corrective regimen sliding scale   SubCutaneous three times a day before meals  insulin lispro (ADMELOG) corrective regimen sliding scale   SubCutaneous at bedtime  metoprolol tartrate 12.5 milliGRAM(s) Oral every 12 hours    MEDICATIONS  (PRN):  acetaminophen   Tablet .. 650 milliGRAM(s) Oral every 6 hours PRN Temp greater or equal to 38C (100.4F), Mild Pain (1 - 3)  aluminum hydroxide/magnesium hydroxide/simethicone Suspension 30 milliLiter(s) Oral every 4 hours PRN Dyspepsia  melatonin 3 milliGRAM(s) Oral at bedtime PRN Insomnia  ondansetron Injectable 4 milliGRAM(s) IV Push every 8 hours PRN Nausea and/or Vomiting  polyethylene glycol 3350 17 Gram(s) Oral daily PRN Constipation        Vital Signs Last 24 Hrs  T(C): 37.5 (18 Oct 2021 04:35), Max: 38.3 (17 Oct 2021 21:07)  T(F): 99.5 (18 Oct 2021 04:35), Max: 101 (17 Oct 2021 21:07)  HR: 103 (18 Oct 2021 04:35) (78 - 103)  BP: 110/64 (18 Oct 2021 04:35) (110/64 - 137/74)  BP(mean): --  RR: 19 (18 Oct 2021 04:35) (18 - 19)  SpO2: 92% (18 Oct 2021 04:35) (90% - 92%)    PHYSICAL EXAM:    ABDOMEN: soft, NT  GENITALIA: urine pink on slow cbi    LABS:                        8.4    7.60  )-----------( 261      ( 18 Oct 2021 07:38 )             25.6     10-16    134<L>  |  100  |  17  ----------------------------<  95  3.9   |  27  |  1.40<H>    Ca    7.6<L>      16 Oct 2021 08:53  Mg     2.0     10-16              RADIOLOGY & ADDITIONAL TESTS:

## 2021-10-18 NOTE — PROGRESS NOTE ADULT - SUBJECTIVE AND OBJECTIVE BOX
Patient is a 86y old  Male who presents with a chief complaint of hematuria (13 Oct 2021 12:27)  Patient seen in follow up for RANDOLPH.        PAST MEDICAL HISTORY:  Diabetes mellitus      MEDICATIONS  (STANDING):  aminocaproic acid Tablet 500 milliGRAM(s) Oral every 6 hours  aMIOdarone    Tablet 200 milliGRAM(s) Oral daily  cefTRIAXone   IVPB      cefTRIAXone   IVPB 1000 milliGRAM(s) IV Intermittent every 24 hours  dextrose 40% Gel 15 Gram(s) Oral once  dextrose 5%. 1000 milliLiter(s) (50 mL/Hr) IV Continuous <Continuous>  dextrose 5%. 1000 milliLiter(s) (100 mL/Hr) IV Continuous <Continuous>  dextrose 50% Injectable 25 Gram(s) IV Push once  dextrose 50% Injectable 12.5 Gram(s) IV Push once  dextrose 50% Injectable 25 Gram(s) IV Push once  glucagon  Injectable 1 milliGRAM(s) IntraMuscular once  influenza   Vaccine 0.5 milliLiter(s) IntraMuscular once  insulin glargine Injectable (LANTUS) 8 Unit(s) SubCutaneous at bedtime  insulin lispro (ADMELOG) corrective regimen sliding scale   SubCutaneous three times a day before meals  insulin lispro (ADMELOG) corrective regimen sliding scale   SubCutaneous at bedtime  metoprolol tartrate 12.5 milliGRAM(s) Oral every 12 hours    MEDICATIONS  (PRN):  acetaminophen   Tablet .. 650 milliGRAM(s) Oral every 6 hours PRN Temp greater or equal to 38C (100.4F), Mild Pain (1 - 3)  aluminum hydroxide/magnesium hydroxide/simethicone Suspension 30 milliLiter(s) Oral every 4 hours PRN Dyspepsia  melatonin 3 milliGRAM(s) Oral at bedtime PRN Insomnia  ondansetron Injectable 4 milliGRAM(s) IV Push every 8 hours PRN Nausea and/or Vomiting  polyethylene glycol 3350 17 Gram(s) Oral daily PRN Constipation    T(C): 37.1 (10-18-21 @ 12:37), Max: 38.3 (10-17-21 @ 21:07)  HR: 62 (10-18-21 @ 12:37) (62 - 117)  BP: 134/66 (10-18-21 @ 12:37) (100/72 - 137/74)  RR: 18 (10-18-21 @ 12:37)  SpO2: 94% (10-18-21 @ 12:37)  Wt(kg): --  I&O's Detail    17 Oct 2021 07:01  -  18 Oct 2021 07:00  --------------------------------------------------------  IN:  Total IN: 0 mL    OUT:    Voided (mL): 2000 mL  Total OUT: 2000 mL    Total NET: -2000 mL      18 Oct 2021 07:01  -  18 Oct 2021 13:56  --------------------------------------------------------  IN:  Total IN: 0 mL    OUT:    Stool (mL): 0 mL  Total OUT: 0 mL    Total NET: 0 mL                        PHYSICAL EXAM:  General: No distress  Respiratory: b/l air entry  Cardiovascular: S1 S2  Gastrointestinal: soft  Extremities:  edema                LABORATORY:                        8.4    7.60  )-----------( 261      ( 18 Oct 2021 07:38 )             25.6     10-18    132<L>  |  99  |  22  ----------------------------<  132<H>  4.2   |  27  |  1.50<H>    Ca    8.0<L>      18 Oct 2021 07:38  Mg     2.0     10-18      Sodium, Serum: 132 mmol/L (10-18 @ 07:38)    Potassium, Serum: 4.2 mmol/L (10-18 @ 07:38)    Hemoglobin: 8.4 g/dL (10-18 @ 07:38)  Hemoglobin: 8.4 g/dL (10-16 @ 08:53)    Creatinine, Serum 1.50 (10-18 @ 07:38)  Creatinine, Serum 1.40 (10-16 @ 08:53)

## 2021-10-18 NOTE — DIETITIAN INITIAL EVALUATION ADULT. - PROBLEM SELECTOR PLAN 4
Restart home meds as tolerated  Hold eliquis due to hematuria  Monitor on tele  CE flat  Cardio consult
Eczema

## 2021-10-18 NOTE — PROGRESS NOTE ADULT - SUBJECTIVE AND OBJECTIVE BOX
CAPILLARY BLOOD GLUCOSE      POCT Blood Glucose.: 73 mg/dL (17 Oct 2021 21:38)  POCT Blood Glucose.: 167 mg/dL (17 Oct 2021 17:17)  POCT Blood Glucose.: 120 mg/dL (17 Oct 2021 12:05)  POCT Blood Glucose.: 99 mg/dL (17 Oct 2021 08:01)      Vital Signs Last 24 Hrs  T(C): 37.5 (18 Oct 2021 04:35), Max: 38.3 (17 Oct 2021 21:07)  T(F): 99.5 (18 Oct 2021 04:35), Max: 101 (17 Oct 2021 21:07)  HR: 103 (18 Oct 2021 04:35) (78 - 103)  BP: 110/64 (18 Oct 2021 04:35) (110/64 - 137/74)  BP(mean): --  RR: 19 (18 Oct 2021 04:35) (18 - 19)  SpO2: 92% (18 Oct 2021 04:35) (90% - 92%)      Respiratory: CTA B/L  CV: RRR, S1S2, no murmurs, rubs or gallops  Abdominal: Soft, NT, ND +BS, Last BM  Extremities: No edema, + peripheral pulses     10-16    134<L>  |  100  |  17  ----------------------------<  95  3.9   |  27  |  1.40<H>    Ca    7.6<L>      16 Oct 2021 08:53  Mg     2.0     10-16        dextrose 40% Gel 15 Gram(s) Oral once  dextrose 50% Injectable 25 Gram(s) IV Push once  dextrose 50% Injectable 12.5 Gram(s) IV Push once  dextrose 50% Injectable 25 Gram(s) IV Push once  glucagon  Injectable 1 milliGRAM(s) IntraMuscular once  insulin glargine Injectable (LANTUS) 8 Unit(s) SubCutaneous at bedtime  insulin lispro (ADMELOG) corrective regimen sliding scale   SubCutaneous three times a day before meals  insulin lispro (ADMELOG) corrective regimen sliding scale   SubCutaneous at bedtime

## 2021-10-18 NOTE — DIETITIAN INITIAL EVALUATION ADULT. - ORAL INTAKE PTA/DIET HISTORY
PTA per pt  -Intake: poor PO intake x few days PTA; baseline: good PO intake; reports monitoring sodium and carbohydrate intake; confirms cooking  and brining in take-out 50% of the time   -Chewing/Swallowing: denies difficulty  -Allergies/Intolerances: NKFA  -Vitamins/Supplements: MVI

## 2021-10-18 NOTE — DIETITIAN INITIAL EVALUATION ADULT. - OTHER INFO
Intake/GI:   -% intake per flowsheet  -Noted with a bag of "low sodium" potato chips at bedside; reminded pt the importance of monitoring sodium intake   -Last BM documented 10/17; bowel regimen ordered (Miralax)     Endo:   -Hx of DM   -No A1c noted; per pt, recent A1c: 6.8%   -Endorses taking Metformin and Glimepiride daily   -Lantus and sliding scale insulin noted in house; Consistent carbohydrate diet     Renal:   -RANDOLPH 2/2 hematuria   -Noted with low Na+     Education:   -Reinforced DM and low sodium diet education

## 2021-10-18 NOTE — PROGRESS NOTE ADULT - SUBJECTIVE AND OBJECTIVE BOX
Vital Signs Last 24 Hrs  T(C): 37.5 (18 Oct 2021 04:35), Max: 38.3 (17 Oct 2021 21:07)  T(F): 99.5 (18 Oct 2021 04:35), Max: 101 (17 Oct 2021 21:07)  HR: 103 (18 Oct 2021 04:35) (78 - 103)  BP: 110/64 (18 Oct 2021 04:35) (110/64 - 137/74)  BP(mean): --  RR: 19 (18 Oct 2021 04:35) (18 - 19)  SpO2: 92% (18 Oct 2021 04:35) (90% - 92%)    10-18    132<L>  |  99  |  22  ----------------------------<  132<H>  4.2   |  27  |  1.50<H>    Ca    8.0<L>      18 Oct 2021 07:38  Mg     2.0     10-18                            8.4    7.60  )-----------( 261      ( 18 Oct 2021 07:38 )             25.6       CAPILLARY BLOOD GLUCOSE      POCT Blood Glucose.: 145 mg/dL (18 Oct 2021 07:52)  POCT Blood Glucose.: 73 mg/dL (17 Oct 2021 21:38)  POCT Blood Glucose.: 167 mg/dL (17 Oct 2021 17:17)  POCT Blood Glucose.: 120 mg/dL (17 Oct 2021 12:05)              acetaminophen   Tablet .. 650 milliGRAM(s) Oral every 6 hours PRN  aluminum hydroxide/magnesium hydroxide/simethicone Suspension 30 milliLiter(s) Oral every 4 hours PRN  aminocaproic acid Tablet 500 milliGRAM(s) Oral every 6 hours  aMIOdarone    Tablet 200 milliGRAM(s) Oral daily  cefTRIAXone   IVPB      cefTRIAXone   IVPB 1000 milliGRAM(s) IV Intermittent every 24 hours  dextrose 40% Gel 15 Gram(s) Oral once  dextrose 5%. 1000 milliLiter(s) IV Continuous <Continuous>  dextrose 5%. 1000 milliLiter(s) IV Continuous <Continuous>  dextrose 50% Injectable 25 Gram(s) IV Push once  dextrose 50% Injectable 12.5 Gram(s) IV Push once  dextrose 50% Injectable 25 Gram(s) IV Push once  glucagon  Injectable 1 milliGRAM(s) IntraMuscular once  influenza   Vaccine 0.5 milliLiter(s) IntraMuscular once  insulin glargine Injectable (LANTUS) 8 Unit(s) SubCutaneous at bedtime  insulin lispro (ADMELOG) corrective regimen sliding scale   SubCutaneous three times a day before meals  insulin lispro (ADMELOG) corrective regimen sliding scale   SubCutaneous at bedtime  melatonin 3 milliGRAM(s) Oral at bedtime PRN  metoprolol tartrate 12.5 milliGRAM(s) Oral every 12 hours  ondansetron Injectable 4 milliGRAM(s) IV Push every 8 hours PRN  polyethylene glycol 3350 17 Gram(s) Oral daily PRN  Patient is a 86y old  Male who presents with a chief complaint of hematuria (15 Oct 2021 11:40)      INTERVAL HPI/OVERNIGHT EVENTS: Patient seen and examined. NAD. No complaints.          REVIEW OF SYSTEMS:  CONSTITUTIONAL: No fever, no weight loss, or no fatigue  NECK: No pain, no stiffness  RESPIRATORY: No cough, no wheezing, no chills, no hemoptysis, No shortness of breath  CARDIOVASCULAR: No chest pain, no palpitations, no dizziness, no leg swelling  GASTROINTESTINAL: No abdominal pain. No nausea, no vomiting, no hematemesis; No diarrhea, no constipation. No melena, no hematochezia.  GENITOURINARY: No dysuria, no frequency, no hematuria, no incontinence  NEUROLOGICAL: No headaches, no loss of strength, no numbness, no tremors  SKIN: No itching, no burning  MUSCULOSKELETAL: No joint pain, no swelling; No muscle, no back, no extremity pain  PSYCHIATRIC: No depression, no mood swings,   HEME/LYMPH: No easy bruising, no bleeding gums  ALLERY AND IMMUNOLOGIC: No hives       Consultant(s) Notes Reviewed:  [X] YES  [ ] NO    PHYSICAL EXAM:  GENERAL: NAD  HEAD:  Atraumatic, Normocephalic  EYES: EOMI, PERRLA, conjunctiva and sclera clear  ENMT: No tonsillar erythema, exudates, or enlargement; Moist mucous membranes  NECK: Supple, No JVD  NERVOUS SYSTEM:  Awake & alert  CHEST/LUNG: Clear to auscultation bilaterally; No rales, rhonchi, wheezing,  HEART: Regular rate and rhythm  ABDOMEN: Soft, Nontender, Nondistended; Bowel sounds present  EXTREMITIES:  No clubbing, cyanosis, or edema  LYMPH: No lymphadenopathy noted  SKIN: No rashes      Advanced care planning discussed with patient/family [X] YES   [ ] NO    Advanced care planning discussed with patient/family. Patient's health status was discussed. All appropriate changes have been made regarding patient's end-of-life care. Advanced care planning forms reviewed/discussed/completed.  20 minutes spent.

## 2021-10-18 NOTE — PROGRESS NOTE ADULT - SUBJECTIVE AND OBJECTIVE BOX
[facility] -- Ruben Matthews Grossman, [place], Rony Dennis Savella, [place]: Office # [phone]    Follow Up:      Subjective/Observations:     REVIEW OF SYSTEMS: All review of systems is negative for eye, ENT, GI, , allergic, dermatologic, musculoskeletal and neurologic except as described above    PAST MEDICAL & SURGICAL HISTORY:  Diabetes mellitus    History of aortic valve replacement    CAD (coronary artery disease)  cardiac stent        MEDICATIONS  (STANDING):  aminocaproic acid Tablet 500 milliGRAM(s) Oral every 6 hours  aMIOdarone    Tablet 200 milliGRAM(s) Oral daily  cefTRIAXone   IVPB      cefTRIAXone   IVPB 1000 milliGRAM(s) IV Intermittent every 24 hours  dextrose 40% Gel 15 Gram(s) Oral once  dextrose 5%. 1000 milliLiter(s) (50 mL/Hr) IV Continuous <Continuous>  dextrose 5%. 1000 milliLiter(s) (100 mL/Hr) IV Continuous <Continuous>  dextrose 50% Injectable 25 Gram(s) IV Push once  dextrose 50% Injectable 12.5 Gram(s) IV Push once  dextrose 50% Injectable 25 Gram(s) IV Push once  glucagon  Injectable 1 milliGRAM(s) IntraMuscular once  influenza   Vaccine 0.5 milliLiter(s) IntraMuscular once  insulin glargine Injectable (LANTUS) 8 Unit(s) SubCutaneous at bedtime  insulin lispro (ADMELOG) corrective regimen sliding scale   SubCutaneous three times a day before meals  insulin lispro (ADMELOG) corrective regimen sliding scale   SubCutaneous at bedtime  metoprolol tartrate 12.5 milliGRAM(s) Oral every 12 hours    MEDICATIONS  (PRN):  acetaminophen   Tablet .. 650 milliGRAM(s) Oral every 6 hours PRN Temp greater or equal to 38C (100.4F), Mild Pain (1 - 3)  aluminum hydroxide/magnesium hydroxide/simethicone Suspension 30 milliLiter(s) Oral every 4 hours PRN Dyspepsia  melatonin 3 milliGRAM(s) Oral at bedtime PRN Insomnia  ondansetron Injectable 4 milliGRAM(s) IV Push every 8 hours PRN Nausea and/or Vomiting  polyethylene glycol 3350 17 Gram(s) Oral daily PRN Constipation    Allergies    No Known Allergies    Intolerances      Vital Signs Last 24 Hrs  T(C): 37.5 (18 Oct 2021 04:35), Max: 38.3 (17 Oct 2021 21:07)  T(F): 99.5 (18 Oct 2021 04:35), Max: 101 (17 Oct 2021 21:07)  HR: 103 (18 Oct 2021 04:35) (78 - 103)  BP: 110/64 (18 Oct 2021 04:35) (110/64 - 137/74)  BP(mean): --  RR: 19 (18 Oct 2021 04:35) (18 - 19)  SpO2: 92% (18 Oct 2021 04:35) (90% - 92%)  I&O's Summary    17 Oct 2021 07:01  -  18 Oct 2021 07:00  --------------------------------------------------------  IN: 0 mL / OUT: 2000 mL / NET: -2000 mL          TELE:   PHYSICAL EXAM:  Appearance: NAD, no distress, alert,  HEENT: Moist Mucous Membranes, Anicteric  Cardiovascular: Regular rate and rhythm, Normal S1 S2, No JVD, No murmurs, No rubs, gallops or clicks  Respiratory: Non-labored, Clear to auscultation, No rales, No rhonchi, No wheezing.   Gastrointestinal:  Soft, Non-tender, + BS  Neurologic: Non-focal  Skin: Warm and dry, No visible rashes or ulcers, No ecchymosis, No cyanosis  Musculoskeletal: No clubbing, No cyanosis, No joint swelling/tenderness  Psychiatry: Mood & affect appropriate  Lymph: No peripheral edema.     LABS: All Labs Reviewed:                        8.4    7.60  )-----------( 261      ( 18 Oct 2021 07:38 )             25.6                         8.4    10.03 )-----------( 291      ( 16 Oct 2021 08:53 )             25.8     18 Oct 2021 07:38    132    |  99     |  22     ----------------------------<  132    4.2     |  27     |  1.50   16 Oct 2021 08:53    134    |  100    |  17     ----------------------------<  95     3.9     |  27     |  1.40     Ca    8.0        18 Oct 2021 07:38  Ca    7.6        16 Oct 2021 08:53  Mg     2.0       18 Oct 2021 07:38  Mg     2.0       16 Oct 2021 08:53        Creatine Kinase, Serum: 46 U/L (10-13-21 @ 13:57)  Troponin I, Serum: .568 ng/mL (10-13-21 @ 13:57)  Creatine Kinase, Serum: 62 U/L (10-13-21 @ 07:57)        12 Lead ECG:   Ventricular Rate 99 BPM    Atrial Rate 267 BPM    QRS Duration 104 ms    Q-T Interval 376 ms    QTC Calculation(Bazett) 482 ms    R Axis -54 degrees    T Axis 126 degrees    Diagnosis Line Atrial fibrillation  Left axis deviation  Inferior infarct , age undetermined  Anterior infarct (cited on or before 21-FEB-2021)  Abnormal ECG  When compared with ECG of 12-OCT-2021 03:46, (Unconfirmed)  QRS axis shifted left  Inferior infarct is now present  T wave inversion no longer evident in Inferior leads  Confirmed by William Garcia MD ([id number]) on 10/12/2021 2:02:07 PM (10-12-21 @ 11:08)   [facility] -- Ruben Matthews Grossman, [place], Rony Dennis Savella, [place]: Office # [phone]    Follow Up:  AFib CAD    Subjective/Observations: Patient seen and examined. Patient awake, alert, resting comfortably in chair. Reports frustration regarding continued hematuria, would like to go home. No complaints of chest pain, dyspnea, palpitations or dizziness. Tolerating room air.     REVIEW OF SYSTEMS: All review of systems is negative for eye, ENT, GI, , allergic, dermatologic, musculoskeletal and neurologic except as described above    PAST MEDICAL & SURGICAL HISTORY:  Diabetes mellitus    History of aortic valve replacement    CAD (coronary artery disease)  cardiac stent        MEDICATIONS  (STANDING):  aminocaproic acid Tablet 500 milliGRAM(s) Oral every 6 hours  aMIOdarone    Tablet 200 milliGRAM(s) Oral daily  cefTRIAXone   IVPB      cefTRIAXone   IVPB 1000 milliGRAM(s) IV Intermittent every 24 hours  dextrose 40% Gel 15 Gram(s) Oral once  dextrose 5%. 1000 milliLiter(s) (50 mL/Hr) IV Continuous <Continuous>  dextrose 5%. 1000 milliLiter(s) (100 mL/Hr) IV Continuous <Continuous>  dextrose 50% Injectable 25 Gram(s) IV Push once  dextrose 50% Injectable 12.5 Gram(s) IV Push once  dextrose 50% Injectable 25 Gram(s) IV Push once  glucagon  Injectable 1 milliGRAM(s) IntraMuscular once  influenza   Vaccine 0.5 milliLiter(s) IntraMuscular once  insulin glargine Injectable (LANTUS) 8 Unit(s) SubCutaneous at bedtime  insulin lispro (ADMELOG) corrective regimen sliding scale   SubCutaneous three times a day before meals  insulin lispro (ADMELOG) corrective regimen sliding scale   SubCutaneous at bedtime  metoprolol tartrate 12.5 milliGRAM(s) Oral every 12 hours    MEDICATIONS  (PRN):  acetaminophen   Tablet .. 650 milliGRAM(s) Oral every 6 hours PRN Temp greater or equal to 38C (100.4F), Mild Pain (1 - 3)  aluminum hydroxide/magnesium hydroxide/simethicone Suspension 30 milliLiter(s) Oral every 4 hours PRN Dyspepsia  melatonin 3 milliGRAM(s) Oral at bedtime PRN Insomnia  ondansetron Injectable 4 milliGRAM(s) IV Push every 8 hours PRN Nausea and/or Vomiting  polyethylene glycol 3350 17 Gram(s) Oral daily PRN Constipation    Allergies    No Known Allergies    Intolerances      Vital Signs Last 24 Hrs  T(C): 37.5 (18 Oct 2021 04:35), Max: 38.3 (17 Oct 2021 21:07)  T(F): 99.5 (18 Oct 2021 04:35), Max: 101 (17 Oct 2021 21:07)  HR: 103 (18 Oct 2021 04:35) (78 - 103)  BP: 110/64 (18 Oct 2021 04:35) (110/64 - 137/74)  BP(mean): --  RR: 19 (18 Oct 2021 04:35) (18 - 19)  SpO2: 92% (18 Oct 2021 04:35) (90% - 92%)  I&O's Summary    17 Oct 2021 07:01  -  18 Oct 2021 07:00  --------------------------------------------------------  IN: 0 mL / OUT: 2000 mL / NET: -2000 mL          TELE: AFib  5 beats aberrancy?   PHYSICAL EXAM:  Appearance: NAD, no distress, alert, Well developed   HEENT: Moist Mucous Membranes, Anicteric  Cardiovascular: Irregular rate and rhythm, Normal S1 S2, No JVD, + murmur, No rubs, gallops or clicks  Respiratory: Non-labored, Clear to auscultation, No rales, No rhonchi, No wheezing.   Gastrointestinal:  Soft, Non-tender, + BS  Neurologic: Non-focal  Skin: Warm and dry, No visible rashes or ulcers, No ecchymosis, No cyanosis  Musculoskeletal: No clubbing, No cyanosis, No joint swelling/tenderness  Psychiatry: Mood & affect appropriate  Lymph: + peripheral edema.     LABS: All Labs Reviewed:                        8.4    7.60  )-----------( 261      ( 18 Oct 2021 07:38 )             25.6                         8.4    10.03 )-----------( 291      ( 16 Oct 2021 08:53 )             25.8     18 Oct 2021 07:38    132    |  99     |  22     ----------------------------<  132    4.2     |  27     |  1.50   16 Oct 2021 08:53    134    |  100    |  17     ----------------------------<  95     3.9     |  27     |  1.40     Ca    8.0        18 Oct 2021 07:38  Ca    7.6        16 Oct 2021 08:53  Mg     2.0       18 Oct 2021 07:38  Mg     2.0       16 Oct 2021 08:53        Creatine Kinase, Serum: 46 U/L (10-13-21 @ 13:57)  Troponin I, Serum: .568 ng/mL (10-13-21 @ 13:57)  Creatine Kinase, Serum: 62 U/L (10-13-21 @ 07:57)        12 Lead ECG:   Ventricular Rate 99 BPM    Atrial Rate 267 BPM    QRS Duration 104 ms    Q-T Interval 376 ms    QTC Calculation(Bazett) 482 ms    R Axis -54 degrees    T Axis 126 degrees    Diagnosis Line Atrial fibrillation  Left axis deviation  Inferior infarct , age undetermined  Anterior infarct (cited on or before 21-FEB-2021)  Abnormal ECG  When compared with ECG of 12-OCT-2021 03:46, (Unconfirmed)  QRS axis shifted left  Inferior infarct is now present  T wave inversion no longer evident in Inferior leads  Confirmed by William Garcia MD ([id number]) on 10/12/2021 2:02:07 PM (10-12-21 @ 11:08)    < from: Transesophageal Echocardiogram (07.09.21 @ 10:21) >    Patient name: VY SHETH  YOB: 1935   Age: 86 (M)   MR#: [id number]  Study Date: 7/9/2021  Location: O/PSonographer: Bridget Callahan RDCS  2nd Sonographer: Yovanny Gillis M.D.  Study quality: Technically fair  Referring Physician: Demetrius Walker MD  Blood Pressure: 136/67 mmHg  Height: 170 cm  Weight: 84 kg  BSA: 2 m2  ------------------------------------------------------------------------  PROCEDURE: Transesophageal and transthoracic  echocardiograms with 2-D, M-Mode and complete spectral and  color flow Doppler were performed.  Informed consent was  first obtained for BILLY. The patient was sedated - see  anesthesia record.  The procedure was monitored with  automatic blood pressure monitoring, ECG tracings and pulse  oximetry.  The transesophageal probe was placed in the  esophagus posterior to the heart without complications.  INDICATION: Unspecified atrial fibrillation (I48.91)  ------------------------------------------------------------------------  Dimensions:    Normal Values:  LA:     4.7    2.0 - 4.0 cm  Ao:     3.4    2.0 - 3.8 cm  SEPTUM: 1.6    0.6 - 1.2 cm  PWT:    1.4    0.6 - 1.1 cm  LVIDd:  4.4    3.0 - 5.6 cm  LVIDs:  2.9    1.8 - 4.0 cm  Derived variables:  LVMI: 136 g/m2  RWT: 0.63  Fractional short: 34 %  EF (Visual Estimate): 60-65 %  Doppler Peak Velocity (m/sec): AoV=1.7  ------------------------------------------------------------------------  Observations:  Mitral Valve: Mitral annular calcification, otherwise  normal mitral valve. Mild mitral regurgitation.  Aortic Valve/Aorta: Bioprosthetic aortic valve. Peak  transaortic valve gradient equals 12 mm Hg, mean  transaortic valve gradient equals 6 mm Hg, which is  probably normal in the presence of a bioprosthetic aortic  valve. Mild aortic regurgitation.  Peak left ventricular  outflow tract gradient equals 7 mm Hg, LVOT velocity time  integral equals 29 cm.  Aortic Root: 3.5 cm.  LVOT diameter: 2.2 cm.  Complex atheroma noted in aortic arch/descending aorta.  Left Atrium: Moderately dilated left atrium.  LA volume  index = 43 cc/m2.   Spontaneous echo contrast seen.   No  left atrial or left atrial appendage thrombus.   Decreased  left atrial appendage velocities noted.  Left Ventricle: Normal left ventricular systolic function.  No segmental wall motion abnormalities. Moderate concentric  left ventricular hypertrophy.  Right Heart: Severe right atrial enlargement. Right  ventricular enlargement with normal right ventricular  systolic function. Normal tricuspid valve. Severe tricuspid  regurgitation. Normal pulmonic valve.  Pericardium/Pleura: Normal pericardium with no pericardial  effusion.  Hemodynamic: Estimated right atrial pressure is 8 mm Hg.  Estimated right ventricular systolic pressure equals 44 mm  Hg, assuming right atrial pressure equals 8 mm Hg,  consistent with mild pulmonary hypertension. Agitated  saline injection and color flow Doppler demonstrates no  evidence of a patent foramen ovale.  ------------------------------------------------------------------------  Conclusions:  1. Bioprosthetic aortic valve. Peak transaortic valve  gradient equals 12 mm Hg, mean transaortic valve gradient  equals 6 mm Hg, which is probably normal in the presence of  a bioprosthetic aortic valve.  2. Aortic Root: 3.5 cm.  LVOT diameter: 2.2 cm.  Complex atheroma noted in aortic arch/descending aorta.  3. Moderately dilated left atrium.  LA volume index = 43  cc/m2.   Spontaneous echo contrast seen.   No left atrial  or left atrial appendage thrombus.  Decreased left atrial  appendage velocities noted.  4. Moderate concentric left ventricular hypertrophy.  5. Normal left ventricular systolic function. No segmental  wall motion abnormalities.  6. Severe right atrial enlargement.  7. Right ventricular enlargement with normal right  ventricular systolic function.  8. Normal tricuspid valve. Severe tricuspid regurgitation.  9. Estimated pulmonary artery systolic pressure equals 44  mm Hg, assuming right atrial pressure equals 8 mm Hg,  consistentwith mild pulmonary pressures.  ------------------------------------------------------------------------  Confirmed on  7/9/2021 - 11:49:32 by ANAMIKA Horne  ------------------------------------------------------------------------    < end of copied text >    < from: US Duplex Venous Lower Ext Complete, Bilateral (02.21.21 @ 15:32) >  IMPRESSION:  No evidence of deep venous thrombosis in either lower extremity.  < end of copied text >    < from: Xray Chest 1 View- PORTABLE-Urgent (Xray Chest 1 View- PORTABLE-Urgent .) (10.11.21 @ 22:08) >  IMPRESSION:   No radiographic evidence of active chest disease.  --- End of Report ---  < end of copied text >   [facility] -- Ruben Matthews Grossman, [place], Rony Dennis Savella, [place]: Office # [phone]    Follow Up:  AFib CAD    Subjective/Observations: Patient seen and examined. Patient awake, alert, resting comfortably in chair. Reports frustration regarding continued hematuria, would like to go home. No complaints of chest pain, dyspnea, palpitations or dizziness. Tolerating room air.     REVIEW OF SYSTEMS: All review of systems is negative for eye, ENT, GI, , allergic, dermatologic, musculoskeletal and neurologic except as described above    PAST MEDICAL & SURGICAL HISTORY:  Diabetes mellitus    History of aortic valve replacement    CAD (coronary artery disease)  cardiac stent        MEDICATIONS  (STANDING):  aminocaproic acid Tablet 500 milliGRAM(s) Oral every 6 hours  aMIOdarone    Tablet 200 milliGRAM(s) Oral daily  cefTRIAXone   IVPB      cefTRIAXone   IVPB 1000 milliGRAM(s) IV Intermittent every 24 hours  dextrose 40% Gel 15 Gram(s) Oral once  dextrose 5%. 1000 milliLiter(s) (50 mL/Hr) IV Continuous <Continuous>  dextrose 5%. 1000 milliLiter(s) (100 mL/Hr) IV Continuous <Continuous>  dextrose 50% Injectable 25 Gram(s) IV Push once  dextrose 50% Injectable 12.5 Gram(s) IV Push once  dextrose 50% Injectable 25 Gram(s) IV Push once  glucagon  Injectable 1 milliGRAM(s) IntraMuscular once  influenza   Vaccine 0.5 milliLiter(s) IntraMuscular once  insulin glargine Injectable (LANTUS) 8 Unit(s) SubCutaneous at bedtime  insulin lispro (ADMELOG) corrective regimen sliding scale   SubCutaneous three times a day before meals  insulin lispro (ADMELOG) corrective regimen sliding scale   SubCutaneous at bedtime  metoprolol tartrate 12.5 milliGRAM(s) Oral every 12 hours    MEDICATIONS  (PRN):  acetaminophen   Tablet .. 650 milliGRAM(s) Oral every 6 hours PRN Temp greater or equal to 38C (100.4F), Mild Pain (1 - 3)  aluminum hydroxide/magnesium hydroxide/simethicone Suspension 30 milliLiter(s) Oral every 4 hours PRN Dyspepsia  melatonin 3 milliGRAM(s) Oral at bedtime PRN Insomnia  ondansetron Injectable 4 milliGRAM(s) IV Push every 8 hours PRN Nausea and/or Vomiting  polyethylene glycol 3350 17 Gram(s) Oral daily PRN Constipation    Allergies    No Known Allergies    Intolerances      Vital Signs Last 24 Hrs  T(C): 37.5 (18 Oct 2021 04:35), Max: 38.3 (17 Oct 2021 21:07)  T(F): 99.5 (18 Oct 2021 04:35), Max: 101 (17 Oct 2021 21:07)  HR: 103 (18 Oct 2021 04:35) (78 - 103)  BP: 110/64 (18 Oct 2021 04:35) (110/64 - 137/74)  BP(mean): --  RR: 19 (18 Oct 2021 04:35) (18 - 19)  SpO2: 92% (18 Oct 2021 04:35) (90% - 92%)  I&O's Summary    17 Oct 2021 07:01  -  18 Oct 2021 07:00  --------------------------------------------------------  IN: 0 mL / OUT: 2000 mL / NET: -2000 mL          TELE: AFib  5 beats aberrancy?   PHYSICAL EXAM:  Appearance: NAD, no distress, alert, Well developed   HEENT: Moist Mucous Membranes, Anicteric  Cardiovascular: Irregular rate and rhythm, Normal S1 S2, No JVD, + murmur, No rubs, gallops or clicks  Respiratory: Non-labored, No rales, No rhonchi, + Slight exp wheezing.   Gastrointestinal:  Soft, Non-tender, + BS  Neurologic: Non-focal  Skin: Warm and dry, No visible rashes or ulcers, No ecchymosis, No cyanosis  Musculoskeletal: No clubbing, No cyanosis, No joint swelling/tenderness  Psychiatry: Mood & affect appropriate  Lymph: +2 peripheral edema.     LABS: All Labs Reviewed:                        8.4    7.60  )-----------( 261      ( 18 Oct 2021 07:38 )             25.6                         8.4    10.03 )-----------( 291      ( 16 Oct 2021 08:53 )             25.8     18 Oct 2021 07:38    132    |  99     |  22     ----------------------------<  132    4.2     |  27     |  1.50   16 Oct 2021 08:53    134    |  100    |  17     ----------------------------<  95     3.9     |  27     |  1.40     Ca    8.0        18 Oct 2021 07:38  Ca    7.6        16 Oct 2021 08:53  Mg     2.0       18 Oct 2021 07:38  Mg     2.0       16 Oct 2021 08:53        Creatine Kinase, Serum: 46 U/L (10-13-21 @ 13:57)  Troponin I, Serum: .568 ng/mL (10-13-21 @ 13:57)  Creatine Kinase, Serum: 62 U/L (10-13-21 @ 07:57)        12 Lead ECG:   Ventricular Rate 99 BPM    Atrial Rate 267 BPM    QRS Duration 104 ms    Q-T Interval 376 ms    QTC Calculation(Bazett) 482 ms    R Axis -54 degrees    T Axis 126 degrees    Diagnosis Line Atrial fibrillation  Left axis deviation  Inferior infarct , age undetermined  Anterior infarct (cited on or before 21-FEB-2021)  Abnormal ECG  When compared with ECG of 12-OCT-2021 03:46, (Unconfirmed)  QRS axis shifted left  Inferior infarct is now present  T wave inversion no longer evident in Inferior leads  Confirmed by William Garcia MD ([id number]) on 10/12/2021 2:02:07 PM (10-12-21 @ 11:08)    < from: Transesophageal Echocardiogram (07.09.21 @ 10:21) >    Patient name: VY SHETH  YOB: 1935   Age: 86 (M)   MR#: [id number]  Study Date: 7/9/2021  Location: O/PSonographer: Bridget Callahan RDCS  2nd Sonographer: Yovanny Gillis M.D.  Study quality: Technically fair  Referring Physician: Demetrius Walker MD  Blood Pressure: 136/67 mmHg  Height: 170 cm  Weight: 84 kg  BSA: 2 m2  ------------------------------------------------------------------------  PROCEDURE: Transesophageal and transthoracic  echocardiograms with 2-D, M-Mode and complete spectral and  color flow Doppler were performed.  Informed consent was  first obtained for BILLY. The patient was sedated - see  anesthesia record.  The procedure was monitored with  automatic blood pressure monitoring, ECG tracings and pulse  oximetry.  The transesophageal probe was placed in the  esophagus posterior to the heart without complications.  INDICATION: Unspecified atrial fibrillation (I48.91)  ------------------------------------------------------------------------  Dimensions:    Normal Values:  LA:     4.7    2.0 - 4.0 cm  Ao:     3.4    2.0 - 3.8 cm  SEPTUM: 1.6    0.6 - 1.2 cm  PWT:    1.4    0.6 - 1.1 cm  LVIDd:  4.4    3.0 - 5.6 cm  LVIDs:  2.9    1.8 - 4.0 cm  Derived variables:  LVMI: 136 g/m2  RWT: 0.63  Fractional short: 34 %  EF (Visual Estimate): 60-65 %  Doppler Peak Velocity (m/sec): AoV=1.7  ------------------------------------------------------------------------  Observations:  Mitral Valve: Mitral annular calcification, otherwise  normal mitral valve. Mild mitral regurgitation.  Aortic Valve/Aorta: Bioprosthetic aortic valve. Peak  transaortic valve gradient equals 12 mm Hg, mean  transaortic valve gradient equals 6 mm Hg, which is  probably normal in the presence of a bioprosthetic aortic  valve. Mild aortic regurgitation.  Peak left ventricular  outflow tract gradient equals 7 mm Hg, LVOT velocity time  integral equals 29 cm.  Aortic Root: 3.5 cm.  LVOT diameter: 2.2 cm.  Complex atheroma noted in aortic arch/descending aorta.  Left Atrium: Moderately dilated left atrium.  LA volume  index = 43 cc/m2.   Spontaneous echo contrast seen.   No  left atrial or left atrial appendage thrombus.   Decreased  left atrial appendage velocities noted.  Left Ventricle: Normal left ventricular systolic function.  No segmental wall motion abnormalities. Moderate concentric  left ventricular hypertrophy.  Right Heart: Severe right atrial enlargement. Right  ventricular enlargement with normal right ventricular  systolic function. Normal tricuspid valve. Severe tricuspid  regurgitation. Normal pulmonic valve.  Pericardium/Pleura: Normal pericardium with no pericardial  effusion.  Hemodynamic: Estimated right atrial pressure is 8 mm Hg.  Estimated right ventricular systolic pressure equals 44 mm  Hg, assuming right atrial pressure equals 8 mm Hg,  consistent with mild pulmonary hypertension. Agitated  saline injection and color flow Doppler demonstrates no  evidence of a patent foramen ovale.  ------------------------------------------------------------------------  Conclusions:  1. Bioprosthetic aortic valve. Peak transaortic valve  gradient equals 12 mm Hg, mean transaortic valve gradient  equals 6 mm Hg, which is probably normal in the presence of  a bioprosthetic aortic valve.  2. Aortic Root: 3.5 cm.  LVOT diameter: 2.2 cm.  Complex atheroma noted in aortic arch/descending aorta.  3. Moderately dilated left atrium.  LA volume index = 43  cc/m2.   Spontaneous echo contrast seen.   No left atrial  or left atrial appendage thrombus.  Decreased left atrial  appendage velocities noted.  4. Moderate concentric left ventricular hypertrophy.  5. Normal left ventricular systolic function. No segmental  wall motion abnormalities.  6. Severe right atrial enlargement.  7. Right ventricular enlargement with normal right  ventricular systolic function.  8. Normal tricuspid valve. Severe tricuspid regurgitation.  9. Estimated pulmonary artery systolic pressure equals 44  mm Hg, assuming right atrial pressure equals 8 mm Hg,  consistentwith mild pulmonary pressures.  ------------------------------------------------------------------------  Confirmed on  7/9/2021 - 11:49:32 by ANAMKIA Horne  ------------------------------------------------------------------------    < end of copied text >    < from: US Duplex Venous Lower Ext Complete, Bilateral (02.21.21 @ 15:32) >  IMPRESSION:  No evidence of deep venous thrombosis in either lower extremity.  < end of copied text >    < from: Xray Chest 1 View- PORTABLE-Urgent (Xray Chest 1 View- PORTABLE-Urgent .) (10.11.21 @ 22:08) >  IMPRESSION:   No radiographic evidence of active chest disease.  --- End of Report ---  < end of copied text >

## 2021-10-19 LAB
ANION GAP SERPL CALC-SCNC: 5 MMOL/L — SIGNIFICANT CHANGE UP (ref 5–17)
BUN SERPL-MCNC: 18 MG/DL — SIGNIFICANT CHANGE UP (ref 7–23)
CALCIUM SERPL-MCNC: 7.7 MG/DL — LOW (ref 8.5–10.1)
CHLORIDE SERPL-SCNC: 101 MMOL/L — SIGNIFICANT CHANGE UP (ref 96–108)
CO2 SERPL-SCNC: 28 MMOL/L — SIGNIFICANT CHANGE UP (ref 22–31)
CREAT SERPL-MCNC: 1.4 MG/DL — HIGH (ref 0.5–1.3)
GLUCOSE SERPL-MCNC: 84 MG/DL — SIGNIFICANT CHANGE UP (ref 70–99)
HCT VFR BLD CALC: 24.9 % — LOW (ref 39–50)
HGB BLD-MCNC: 8.2 G/DL — LOW (ref 13–17)
MAGNESIUM SERPL-MCNC: 1.9 MG/DL — SIGNIFICANT CHANGE UP (ref 1.6–2.6)
MCHC RBC-ENTMCNC: 29.7 PG — SIGNIFICANT CHANGE UP (ref 27–34)
MCHC RBC-ENTMCNC: 32.9 GM/DL — SIGNIFICANT CHANGE UP (ref 32–36)
MCV RBC AUTO: 90.2 FL — SIGNIFICANT CHANGE UP (ref 80–100)
NRBC # BLD: 0 /100 WBCS — SIGNIFICANT CHANGE UP (ref 0–0)
PLATELET # BLD AUTO: 282 K/UL — SIGNIFICANT CHANGE UP (ref 150–400)
POTASSIUM SERPL-MCNC: 4.2 MMOL/L — SIGNIFICANT CHANGE UP (ref 3.5–5.3)
POTASSIUM SERPL-SCNC: 4.2 MMOL/L — SIGNIFICANT CHANGE UP (ref 3.5–5.3)
RBC # BLD: 2.76 M/UL — LOW (ref 4.2–5.8)
RBC # FLD: 14.5 % — SIGNIFICANT CHANGE UP (ref 10.3–14.5)
SODIUM SERPL-SCNC: 134 MMOL/L — LOW (ref 135–145)
WBC # BLD: 6.96 K/UL — SIGNIFICANT CHANGE UP (ref 3.8–10.5)
WBC # FLD AUTO: 6.96 K/UL — SIGNIFICANT CHANGE UP (ref 3.8–10.5)

## 2021-10-19 PROCEDURE — 99233 SBSQ HOSP IP/OBS HIGH 50: CPT

## 2021-10-19 RX ADMIN — AMINOCAPROIC ACID 500 MILLIGRAM(S): 500 TABLET ORAL at 11:27

## 2021-10-19 RX ADMIN — Medication 12.5 MILLIGRAM(S): at 05:48

## 2021-10-19 RX ADMIN — POLYETHYLENE GLYCOL 3350 17 GRAM(S): 17 POWDER, FOR SOLUTION ORAL at 14:55

## 2021-10-19 RX ADMIN — INSULIN GLARGINE 8 UNIT(S): 100 INJECTION, SOLUTION SUBCUTANEOUS at 21:03

## 2021-10-19 RX ADMIN — AMINOCAPROIC ACID 500 MILLIGRAM(S): 500 TABLET ORAL at 17:29

## 2021-10-19 RX ADMIN — AMINOCAPROIC ACID 500 MILLIGRAM(S): 500 TABLET ORAL at 05:48

## 2021-10-19 RX ADMIN — AMIODARONE HYDROCHLORIDE 200 MILLIGRAM(S): 400 TABLET ORAL at 05:48

## 2021-10-19 RX ADMIN — Medication 3 MILLIGRAM(S): at 21:02

## 2021-10-19 RX ADMIN — Medication 12.5 MILLIGRAM(S): at 17:29

## 2021-10-19 NOTE — PROGRESS NOTE ADULT - SUBJECTIVE AND OBJECTIVE BOX
Patient is a 86y old  Male who presents with a chief complaint of hematuria (19 Oct 2021 11:52)      INTERVAL HPI/OVERNIGHT EVENTS: Patient seen and examined. NAD. No complaints.     Vital Signs Last 24 Hrs  T(C): 36.4 (19 Oct 2021 05:05), Max: 37.1 (18 Oct 2021 12:37)  T(F): 97.6 (19 Oct 2021 05:05), Max: 98.8 (18 Oct 2021 12:37)  HR: 88 (19 Oct 2021 05:05) (62 - 112)  BP: 145/76 (19 Oct 2021 05:05) (119/72 - 145/76)  BP(mean): --  RR: 19 (19 Oct 2021 05:05) (18 - 19)  SpO2: 95% (19 Oct 2021 05:05) (90% - 95%)    10-19    134<L>  |  101  |  18  ----------------------------<  84  4.2   |  28  |  1.40<H>    Ca    7.7<L>      19 Oct 2021 08:05  Mg     1.9     10-19                            8.2    6.96  )-----------( 282      ( 19 Oct 2021 08:05 )             24.9       CAPILLARY BLOOD GLUCOSE      POCT Blood Glucose.: 87 mg/dL (19 Oct 2021 11:47)  POCT Blood Glucose.: 98 mg/dL (19 Oct 2021 07:42)  POCT Blood Glucose.: 225 mg/dL (18 Oct 2021 21:23)  POCT Blood Glucose.: 106 mg/dL (18 Oct 2021 17:01)              acetaminophen   Tablet .. 650 milliGRAM(s) Oral every 6 hours PRN  aluminum hydroxide/magnesium hydroxide/simethicone Suspension 30 milliLiter(s) Oral every 4 hours PRN  aminocaproic acid Tablet 500 milliGRAM(s) Oral every 6 hours  aMIOdarone    Tablet 200 milliGRAM(s) Oral daily  cefTRIAXone   IVPB      cefTRIAXone   IVPB 1000 milliGRAM(s) IV Intermittent every 24 hours  dextrose 40% Gel 15 Gram(s) Oral once  dextrose 5%. 1000 milliLiter(s) IV Continuous <Continuous>  dextrose 5%. 1000 milliLiter(s) IV Continuous <Continuous>  dextrose 50% Injectable 25 Gram(s) IV Push once  dextrose 50% Injectable 12.5 Gram(s) IV Push once  dextrose 50% Injectable 25 Gram(s) IV Push once  glucagon  Injectable 1 milliGRAM(s) IntraMuscular once  influenza   Vaccine 0.5 milliLiter(s) IntraMuscular once  insulin glargine Injectable (LANTUS) 8 Unit(s) SubCutaneous at bedtime  insulin lispro (ADMELOG) corrective regimen sliding scale   SubCutaneous three times a day before meals  insulin lispro (ADMELOG) corrective regimen sliding scale   SubCutaneous at bedtime  melatonin 3 milliGRAM(s) Oral at bedtime PRN  metoprolol tartrate 12.5 milliGRAM(s) Oral every 12 hours  ondansetron Injectable 4 milliGRAM(s) IV Push every 8 hours PRN  polyethylene glycol 3350 17 Gram(s) Oral daily PRN          REVIEW OF SYSTEMS:  CONSTITUTIONAL: No fever, no weight loss, or no fatigue  NECK: No pain, no stiffness  RESPIRATORY: No cough, no wheezing, no chills, no hemoptysis, No shortness of breath  CARDIOVASCULAR: No chest pain, no palpitations, no dizziness, no leg swelling  GASTROINTESTINAL: No abdominal pain. No nausea, no vomiting, no hematemesis; No diarrhea, no constipation. No melena, no hematochezia.  GENITOURINARY: No dysuria, no frequency, no hematuria, no incontinence  NEUROLOGICAL: No headaches, no loss of strength, no numbness, no tremors  SKIN: No itching, no burning  MUSCULOSKELETAL: No joint pain, no swelling; No muscle, no back, no extremity pain  PSYCHIATRIC: No depression, no mood swings,   HEME/LYMPH: No easy bruising, no bleeding gums  ALLERY AND IMMUNOLOGIC: No hives       Consultant(s) Notes Reviewed:  [X] YES  [ ] NO    PHYSICAL EXAM:  GENERAL: NAD  HEAD:  Atraumatic, Normocephalic  EYES: EOMI, PERRLA, conjunctiva and sclera clear  ENMT: No tonsillar erythema, exudates, or enlargement; Moist mucous membranes  NECK: Supple, No JVD  NERVOUS SYSTEM:  Awake & alert  CHEST/LUNG: Clear to auscultation bilaterally; No rales, rhonchi, wheezing,  HEART: Regular rate and rhythm  ABDOMEN: Soft, Nontender, Nondistended; Bowel sounds present  EXTREMITIES:  No clubbing, cyanosis, or edema  LYMPH: No lymphadenopathy noted  SKIN: No rashes      Advanced care planning discussed with patient/family [X] YES   [ ] NO    Advanced care planning discussed with patient/family. Patient's health status was discussed. All appropriate changes have been made regarding patient's end-of-life care. Advanced care planning forms reviewed/discussed/completed.  20 minutes spent.

## 2021-10-19 NOTE — PROGRESS NOTE ADULT - SUBJECTIVE AND OBJECTIVE BOX
Patient is a 86y old  Male who presents with a chief complaint of hematuria (13 Oct 2021 12:27)  Patient seen in follow up for RANDOLPH.        PAST MEDICAL HISTORY:  Diabetes mellitus      MEDICATIONS  (STANDING):  aminocaproic acid Tablet 500 milliGRAM(s) Oral every 6 hours  aMIOdarone    Tablet 200 milliGRAM(s) Oral daily  cefTRIAXone   IVPB      cefTRIAXone   IVPB 1000 milliGRAM(s) IV Intermittent every 24 hours  dextrose 40% Gel 15 Gram(s) Oral once  dextrose 5%. 1000 milliLiter(s) (50 mL/Hr) IV Continuous <Continuous>  dextrose 5%. 1000 milliLiter(s) (100 mL/Hr) IV Continuous <Continuous>  dextrose 50% Injectable 25 Gram(s) IV Push once  dextrose 50% Injectable 12.5 Gram(s) IV Push once  dextrose 50% Injectable 25 Gram(s) IV Push once  glucagon  Injectable 1 milliGRAM(s) IntraMuscular once  influenza   Vaccine 0.5 milliLiter(s) IntraMuscular once  insulin glargine Injectable (LANTUS) 8 Unit(s) SubCutaneous at bedtime  insulin lispro (ADMELOG) corrective regimen sliding scale   SubCutaneous three times a day before meals  insulin lispro (ADMELOG) corrective regimen sliding scale   SubCutaneous at bedtime  metoprolol tartrate 12.5 milliGRAM(s) Oral every 12 hours    MEDICATIONS  (PRN):  acetaminophen   Tablet .. 650 milliGRAM(s) Oral every 6 hours PRN Temp greater or equal to 38C (100.4F), Mild Pain (1 - 3)  aluminum hydroxide/magnesium hydroxide/simethicone Suspension 30 milliLiter(s) Oral every 4 hours PRN Dyspepsia  melatonin 3 milliGRAM(s) Oral at bedtime PRN Insomnia  ondansetron Injectable 4 milliGRAM(s) IV Push every 8 hours PRN Nausea and/or Vomiting  polyethylene glycol 3350 17 Gram(s) Oral daily PRN Constipation    T(C): 36.4 (10-19-21 @ 05:05), Max: 38.3 (10-17-21 @ 21:07)  HR: 88 (10-19-21 @ 05:05) (62 - 112)  BP: 145/76 (10-19-21 @ 05:05) (110/64 - 145/76)  RR: 19 (10-19-21 @ 05:05)  SpO2: 95% (10-19-21 @ 05:05)  Wt(kg): --  I&O's Detail    18 Oct 2021 07:01  -  19 Oct 2021 07:00  --------------------------------------------------------  IN:    Oral Fluid: 540 mL  Total IN: 540 mL    OUT:    Stool (mL): 0 mL  Total OUT: 0 mL    Total NET: 540 mL            PHYSICAL EXAM:  General: No distress  Respiratory: b/l air entry  Cardiovascular: S1 S2  Gastrointestinal: soft  Extremities:  edema           LABORATORY:                        8.2    6.96  )-----------( 282      ( 19 Oct 2021 08:05 )             24.9     10-19    134<L>  |  101  |  18  ----------------------------<  84  4.2   |  28  |  1.40<H>    Ca    7.7<L>      19 Oct 2021 08:05  Mg     1.9     10-19      Sodium, Serum: 134 mmol/L (10-19 @ 08:05)  Sodium, Serum: 132 mmol/L (10-18 @ 07:38)    Potassium, Serum: 4.2 mmol/L (10-19 @ 08:05)  Potassium, Serum: 4.2 mmol/L (10-18 @ 07:38)    Hemoglobin: 8.2 g/dL (10-19 @ 08:05)  Hemoglobin: 8.4 g/dL (10-18 @ 07:38)    Creatinine, Serum 1.40 (10-19 @ 08:05)  Creatinine, Serum 1.50 (10-18 @ 07:38)

## 2021-10-19 NOTE — PROGRESS NOTE ADULT - SUBJECTIVE AND OBJECTIVE BOX
CAPILLARY BLOOD GLUCOSE      POCT Blood Glucose.: 225 mg/dL (18 Oct 2021 21:23)  POCT Blood Glucose.: 106 mg/dL (18 Oct 2021 17:01)  POCT Blood Glucose.: 240 mg/dL (18 Oct 2021 11:55)  POCT Blood Glucose.: 145 mg/dL (18 Oct 2021 07:52)      Vital Signs Last 24 Hrs  T(C): 36.4 (19 Oct 2021 05:05), Max: 37.1 (18 Oct 2021 12:37)  T(F): 97.6 (19 Oct 2021 05:05), Max: 98.8 (18 Oct 2021 12:37)  HR: 88 (19 Oct 2021 05:05) (62 - 112)  BP: 145/76 (19 Oct 2021 05:05) (119/72 - 145/76)  BP(mean): --  RR: 19 (19 Oct 2021 05:05) (18 - 19)  SpO2: 95% (19 Oct 2021 05:05) (90% - 95%)    Respiratory: CTA B/L  CV: RRR, S1S2, no murmurs, rubs or gallops  Abdominal: Soft, NT, ND +BS, Last BM  Extremities: No edema, + peripheral pulses     10-18    132<L>  |  99  |  22  ----------------------------<  132<H>  4.2   |  27  |  1.50<H>    Ca    8.0<L>      18 Oct 2021 07:38  Mg     2.0     10-18        dextrose 40% Gel 15 Gram(s) Oral once  dextrose 50% Injectable 25 Gram(s) IV Push once  dextrose 50% Injectable 12.5 Gram(s) IV Push once  dextrose 50% Injectable 25 Gram(s) IV Push once  glucagon  Injectable 1 milliGRAM(s) IntraMuscular once  insulin glargine Injectable (LANTUS) 8 Unit(s) SubCutaneous at bedtime  insulin lispro (ADMELOG) corrective regimen sliding scale   SubCutaneous three times a day before meals  insulin lispro (ADMELOG) corrective regimen sliding scale   SubCutaneous at bedtime

## 2021-10-19 NOTE — PROGRESS NOTE ADULT - ASSESSMENT
RANDOLPH on CKD 3: Crea 2.2 07/2021, Crea 1.3 02/2021  Hematuria, s/p recent TURP  A fib  Hypertension  Diabetes    Improved and stable renal indices. To continue current meds. Monitor blood sugar levels. Insulin coverage as needed. Dietary restriction.   Monitor h/h trend. Transfuse PRN. Monitor BP trend. Titrate BP meds as needed. Salt restriction. Will follow electrolytes and renal function trend.  follow up.

## 2021-10-19 NOTE — PROGRESS NOTE ADULT - ASSESSMENT
87 yo male with PMH afib (on Eliquis, s/p BILLY cardioversion 7/2021), BPH, CAD (s/p stent 2005), Aortic valve replacement (2001) presents to ED hematuria found to be in Afib with RVR.     Chronic Afib  - S/P BILLY cardioversion 7/2021.  On home Eliquis.  On hold prior to his urology procedure (?TURP) on 10/1/21  - Presented with hematuria, now sp CBI.  resume eliquis when cleared by urology   - Rapid atrial fibrillation on admission likely from missed BB dose for the last few days, overnight AF no events can dc tele   - Continue BB.  On home Amio.  However, in the setting of persistent Afib is being used more as rate control.  Follows with Dr. Miranda  - Monitor electrolytes, replete to keep K>4 and Mag>2     CAD s/p stents/Elevated trops  - Trops 0.186. CKs are flat, suggesting against acute atherosclerotic plaque rupture.  - EKG showing mild ST wave depressions  - Mild demand ischemia likely in the setting of acute anemia , to fu with Dr Miranda on discharge   - Transfuse as per primary to keep Hgb > 8 given CAD   - Recent BILLY showed normal LVF, EF 60-65%, AVR, (bio), dilated LA, RVE with normal RVSF, severe SULMA, and severe TR  - B/L LE edema worsening as per patient , on Lasix 80mg at home has been held- resume home lasix   - Continue BB- increase to Lopressor 25 mg BID with parameters    RANDOLPH on CKD  - Resume home Lasix   - Renal following    - Monitor and replete lytes, keep K>4, Mg>2.  - All other medical needs as per primary team.  - Other cardiovascular workup will depend on clinical course.  - Will continue to follow.  Torri Flores FNP-C  Cardiology NP  [phone]

## 2021-10-19 NOTE — PROGRESS NOTE ADULT - SUBJECTIVE AND OBJECTIVE BOX
INTERVAL HPI/OVERNIGHT EVENTS: Urine clear overnight. Urine leaking around catheter.    MEDICATIONS  (STANDING):  aminocaproic acid Tablet 500 milliGRAM(s) Oral every 6 hours  aMIOdarone    Tablet 200 milliGRAM(s) Oral daily  cefTRIAXone   IVPB      cefTRIAXone   IVPB 1000 milliGRAM(s) IV Intermittent every 24 hours  dextrose 40% Gel 15 Gram(s) Oral once  dextrose 5%. 1000 milliLiter(s) (50 mL/Hr) IV Continuous <Continuous>  dextrose 5%. 1000 milliLiter(s) (100 mL/Hr) IV Continuous <Continuous>  dextrose 50% Injectable 25 Gram(s) IV Push once  dextrose 50% Injectable 12.5 Gram(s) IV Push once  dextrose 50% Injectable 25 Gram(s) IV Push once  glucagon  Injectable 1 milliGRAM(s) IntraMuscular once  influenza   Vaccine 0.5 milliLiter(s) IntraMuscular once  insulin glargine Injectable (LANTUS) 8 Unit(s) SubCutaneous at bedtime  insulin lispro (ADMELOG) corrective regimen sliding scale   SubCutaneous three times a day before meals  insulin lispro (ADMELOG) corrective regimen sliding scale   SubCutaneous at bedtime  metoprolol tartrate 12.5 milliGRAM(s) Oral every 12 hours    MEDICATIONS  (PRN):  acetaminophen   Tablet .. 650 milliGRAM(s) Oral every 6 hours PRN Temp greater or equal to 38C (100.4F), Mild Pain (1 - 3)  aluminum hydroxide/magnesium hydroxide/simethicone Suspension 30 milliLiter(s) Oral every 4 hours PRN Dyspepsia  melatonin 3 milliGRAM(s) Oral at bedtime PRN Insomnia  ondansetron Injectable 4 milliGRAM(s) IV Push every 8 hours PRN Nausea and/or Vomiting  polyethylene glycol 3350 17 Gram(s) Oral daily PRN Constipation        Vital Signs Last 24 Hrs  T(C): 36.4 (19 Oct 2021 05:05), Max: 37.1 (18 Oct 2021 12:37)  T(F): 97.6 (19 Oct 2021 05:05), Max: 98.8 (18 Oct 2021 12:37)  HR: 88 (19 Oct 2021 05:05) (62 - 112)  BP: 145/76 (19 Oct 2021 05:05) (119/72 - 145/76)  BP(mean): --  RR: 19 (19 Oct 2021 05:05) (18 - 19)  SpO2: 95% (19 Oct 2021 05:05) (90% - 95%)    PHYSICAL EXAM:    ABDOMEN: Soft. SP distention noted.   GENITALIA: Urine clear via [place]. Catheter draining poorly.    LABS:                        8.4    7.60  )-----------( 261      ( 18 Oct 2021 07:38 )             25.6     10-18    132<L>  |  99  |  22  ----------------------------<  132<H>  4.2   |  27  |  1.50<H>    Ca    8.0<L>      18 Oct 2021 07:38  Mg     2.0     10-18

## 2021-10-19 NOTE — PHARMACOTHERAPY INTERVENTION NOTE - COMMENTS
Patient is being treated for UTI with ceftriaxone 1g q24h (day 8).  Discussed with Dr. RAFA Shrestha about potentially discontinuing abx since cultures are negative and patient received adequate abx treatment duration for UTI.  MD agreed to discontinue.

## 2021-10-19 NOTE — PROGRESS NOTE ADULT - SUBJECTIVE AND OBJECTIVE BOX
[facility] -- Ruben Matthews, Betsy, Jose, Sonny, Ijeoma Uribe  Office # [phone]      Follow Up:    CAD   Subjective/Observations:   No events overnight resting comfortably in bed.  No complaints of chest pain, dyspnea, or palpitations reported. No signs of orthopnea or PND.  now off CBI  Complaints of generalized weakness, + le edema     REVIEW OF SYSTEMS: All other review of systems is negative unless indicated above    PAST MEDICAL & SURGICAL HISTORY:  Diabetes mellitus    History of aortic valve replacement    CAD (coronary artery disease)  cardiac stent        MEDICATIONS  (STANDING):  aminocaproic acid Tablet 500 milliGRAM(s) Oral every 6 hours  aMIOdarone    Tablet 200 milliGRAM(s) Oral daily  cefTRIAXone   IVPB      cefTRIAXone   IVPB 1000 milliGRAM(s) IV Intermittent every 24 hours  dextrose 40% Gel 15 Gram(s) Oral once  dextrose 5%. 1000 milliLiter(s) (50 mL/Hr) IV Continuous <Continuous>  dextrose 5%. 1000 milliLiter(s) (100 mL/Hr) IV Continuous <Continuous>  dextrose 50% Injectable 25 Gram(s) IV Push once  dextrose 50% Injectable 12.5 Gram(s) IV Push once  dextrose 50% Injectable 25 Gram(s) IV Push once  glucagon  Injectable 1 milliGRAM(s) IntraMuscular once  influenza   Vaccine 0.5 milliLiter(s) IntraMuscular once  insulin glargine Injectable (LANTUS) 8 Unit(s) SubCutaneous at bedtime  insulin lispro (ADMELOG) corrective regimen sliding scale   SubCutaneous three times a day before meals  insulin lispro (ADMELOG) corrective regimen sliding scale   SubCutaneous at bedtime  metoprolol tartrate 12.5 milliGRAM(s) Oral every 12 hours    MEDICATIONS  (PRN):  acetaminophen   Tablet .. 650 milliGRAM(s) Oral every 6 hours PRN Temp greater or equal to 38C (100.4F), Mild Pain (1 - 3)  aluminum hydroxide/magnesium hydroxide/simethicone Suspension 30 milliLiter(s) Oral every 4 hours PRN Dyspepsia  melatonin 3 milliGRAM(s) Oral at bedtime PRN Insomnia  ondansetron Injectable 4 milliGRAM(s) IV Push every 8 hours PRN Nausea and/or Vomiting  polyethylene glycol 3350 17 Gram(s) Oral daily PRN Constipation      Allergies    No Known Allergies    Intolerances        Vital Signs Last 24 Hrs  T(C): 36.4 (19 Oct 2021 05:05), Max: 37.1 (18 Oct 2021 12:37)  T(F): 97.6 (19 Oct 2021 05:05), Max: 98.8 (18 Oct 2021 12:37)  HR: 88 (19 Oct 2021 05:05) (62 - 112)  BP: 145/76 (19 Oct 2021 05:05) (119/72 - 145/76)  BP(mean): --  RR: 19 (19 Oct 2021 05:05) (18 - 19)  SpO2: 95% (19 Oct 2021 05:05) (90% - 95%)    I&O's Summary    18 Oct 2021 07:01  -  19 Oct 2021 07:00  --------------------------------------------------------  IN: 540 mL / OUT: 0 mL / NET: 540 mL          PHYSICAL EXAM:  TELE: Af 99  Constitutional: NAD, awake and alert, well-developed  HEENT: Moist Mucous Membranes, Anicteric  Pulmonary: Non-labored, breath sounds are clear bilaterally, No wheezing, crackles or rhonchi  Cardiovascular: IRRegular, S1 and S2 nl, + murmur   Gastrointestinal: Bowel Sounds present, soft, nontender.   Lymph: +2 bilateral peripheral edema.  Skin: No visible rashes or ulcers.  Psych:  Mood & affect appropriate    LABS: All Labs Reviewed:                        8.2    6.96  )-----------( 282      ( 19 Oct 2021 08:05 )             24.9                         8.4    7.60  )-----------( 261      ( 18 Oct 2021 07:38 )             25.6     19 Oct 2021 08:05    134    |  101    |  18     ----------------------------<  84     4.2     |  28     |  1.40   18 Oct 2021 07:38    132    |  99     |  22     ----------------------------<  132    4.2     |  27     |  1.50     Ca    7.7        19 Oct 2021 08:05  Ca    8.0        18 Oct 2021 07:38  Mg     1.9       19 Oct 2021 08:05  Mg     2.0       18 Oct 2021 07:38    12 Lead ECG:   Ventricular Rate 99 BPM    Atrial Rate 267 BPM    QRS Duration 104 ms    Q-T Interval 376 ms    QTC Calculation(Bazett) 482 ms    R Axis -54 degrees    T Axis 126 degrees    Diagnosis Line Atrial fibrillation  Left axis deviation  Inferior infarct , age undetermined  Anterior infarct (cited on or before 21-FEB-2021)  Abnormal ECG  When compared with ECG of 12-OCT-2021 03:46, (Unconfirmed)  QRS axis shifted left  Inferior infarct is now present  T wave inversion no longer evident in Inferior leads  Confirmed by William Garcia MD ([id number]) on 10/12/2021 2:02:07 PM (10-12-21 @ 11:08)    < from: Transesophageal Echocardiogram (07.09.21 @ 10:21) >    Patient name: VY SHETH  YOB: 1935   Age: 86 (M)   MR#: [id number]  Study Date: 7/9/2021  Location: O/PSonographer: Bridget Callahan [address] Sonographer: Yovanny Gillis M.D.  Study quality: Technically fair  Referring Physician: Demetrius Walker MD  Blood Pressure: 136/67 mmHg  Height: 170 cm  Weight: 84 kg  BSA: 2 m2  ------------------------------------------------------------------------  PROCEDURE: Transesophageal and transthoracic  echocardiograms with 2-D, M-Mode and complete spectral and  color flow Doppler were performed.  Informed consent was  first obtained for BILLY. The patient was sedated - see  anesthesia record.  The procedure was monitored with  automatic blood pressure monitoring, ECG tracings and pulse  oximetry.  The transesophageal probe was placed in the  esophagus posterior to the heart without complications.  INDICATION: Unspecified atrial fibrillation (I48.91)  ------------------------------------------------------------------------  Dimensions:    Normal Values:  LA:     4.7    2.0 - 4.0 cm  Ao:     3.4    2.0 - 3.8 cm  SEPTUM: 1.6    0.6 - 1.2 cm  PWT:    1.4    0.6 - 1.1 cm  LVIDd:  4.4    3.0 - 5.6 cm  LVIDs:  2.9    1.8 - 4.0 cm  Derived variables:  LVMI: 136 g/m2  RWT: 0.63  Fractional short: 34 %  EF (Visual Estimate): 60-65 %  Doppler Peak Velocity (m/sec): AoV=1.7  ------------------------------------------------------------------------  Observations:  Mitral Valve: Mitral annular calcification, otherwise  normal mitral valve. Mild mitral regurgitation.  Aortic Valve/Aorta: Bioprosthetic aortic valve. Peak  transaortic valve gradient equals 12 mm Hg, mean  transaortic valve gradient equals 6 mm Hg, which is  probably normal in the presence of a bioprosthetic aortic  valve. Mild aortic regurgitation.  Peak left ventricular  outflow tract gradient equals 7 mm Hg, LVOT velocity time  integral equals 29 cm.  Aortic Root: 3.5 cm.  LVOT diameter: 2.2 cm.  Complex atheroma noted in aortic arch/descending aorta.  Left Atrium: Moderately dilated left atrium.  LA volume  index = 43 cc/m2.   Spontaneous echo contrast seen.   No  left atrial or left atrial appendage thrombus.   Decreased  left atrial appendage velocities noted.  Left Ventricle: Normal left ventricular systolic function.  No segmental wall motion abnormalities. Moderate concentric  left ventricular hypertrophy.  Right Heart: Severe right atrial enlargement. Right  ventricular enlargement with normal right ventricular  systolic function. Normal tricuspid valve. Severe tricuspid  regurgitation. Normal pulmonic valve.  Pericardium/Pleura: Normal pericardium with no pericardial  effusion.  Hemodynamic: Estimated right atrial pressure is 8 mm Hg.  Estimated right ventricular systolic pressure equals 44 mm  Hg, assuming right atrial pressure equals 8 mm Hg,  consistent with mild pulmonary hypertension. Agitated  saline injection and color flow Doppler demonstrates no  evidence of a patent foramen ovale.  ------------------------------------------------------------------------  Conclusions:  1. Bioprosthetic aortic valve. Peak transaortic valve  gradient equals 12 mm Hg, mean transaortic valve gradient  equals 6 mm Hg, which is probably normal in the presence of  a bioprosthetic aortic valve.  2. Aortic Root: 3.5 cm.  LVOT diameter: 2.2 cm.  Complex atheroma noted in aortic arch/descending aorta.  3. Moderately dilated left atrium.  LA volume index = 43  cc/m2.   Spontaneous echo contrast seen.   No left atrial  or left atrial appendage thrombus.  Decreased left atrial  appendage velocities noted.  4. Moderate concentric left ventricular hypertrophy.  5. Normal left ventricular systolic function. No segmental  wall motion abnormalities.  6. Severe right atrial enlargement.  7. Right ventricular enlargement with normal right  ventricular systolic function.  8. Normal tricuspid valve. Severe tricuspid regurgitation.  9. Estimated pulmonary artery systolic pressure equals 44  mm Hg, assuming right atrial pressure equals 8 mm Hg,  consistentwith mild pulmonary pressures.  ------------------------------------------------------------------------  Confirmed on  7/9/2021 - 11:49:32 by ANAMIKA Horne  ------------------------------------------------------------------------    < end of copied text >    < from: US Duplex Venous Lower Ext Complete, Bilateral (02.21.21 @ 15:32) >  IMPRESSION:  No evidence of deep venous thrombosis in either lower extremity.  < end of copied text >    < from: Xray Chest 1 View- PORTABLE-Urgent (Xray Chest 1 View- PORTABLE-Urgent .) (10.11.21 @ 22:08) >  IMPRESSION:   No radiographic evidence of active chest disease.

## 2021-10-20 ENCOUNTER — TRANSCRIPTION ENCOUNTER (OUTPATIENT)
Age: 86
End: 2021-10-20

## 2021-10-20 VITALS
RESPIRATION RATE: 21 BRPM | OXYGEN SATURATION: 98 % | HEART RATE: 87 BPM | DIASTOLIC BLOOD PRESSURE: 56 MMHG | SYSTOLIC BLOOD PRESSURE: 128 MMHG | TEMPERATURE: 98 F

## 2021-10-20 LAB
ANION GAP SERPL CALC-SCNC: 4 MMOL/L — LOW (ref 5–17)
BUN SERPL-MCNC: 19 MG/DL — SIGNIFICANT CHANGE UP (ref 7–23)
CALCIUM SERPL-MCNC: 7.6 MG/DL — LOW (ref 8.5–10.1)
CHLORIDE SERPL-SCNC: 100 MMOL/L — SIGNIFICANT CHANGE UP (ref 96–108)
CO2 SERPL-SCNC: 27 MMOL/L — SIGNIFICANT CHANGE UP (ref 22–31)
CREAT SERPL-MCNC: 1.3 MG/DL — SIGNIFICANT CHANGE UP (ref 0.5–1.3)
GLUCOSE SERPL-MCNC: 103 MG/DL — HIGH (ref 70–99)
HCT VFR BLD CALC: 24.4 % — LOW (ref 39–50)
HGB BLD-MCNC: 7.9 G/DL — LOW (ref 13–17)
MAGNESIUM SERPL-MCNC: 2 MG/DL — SIGNIFICANT CHANGE UP (ref 1.6–2.6)
MCHC RBC-ENTMCNC: 29.2 PG — SIGNIFICANT CHANGE UP (ref 27–34)
MCHC RBC-ENTMCNC: 32.4 GM/DL — SIGNIFICANT CHANGE UP (ref 32–36)
MCV RBC AUTO: 90 FL — SIGNIFICANT CHANGE UP (ref 80–100)
NRBC # BLD: 0 /100 WBCS — SIGNIFICANT CHANGE UP (ref 0–0)
PLATELET # BLD AUTO: 277 K/UL — SIGNIFICANT CHANGE UP (ref 150–400)
POTASSIUM SERPL-MCNC: 4.4 MMOL/L — SIGNIFICANT CHANGE UP (ref 3.5–5.3)
POTASSIUM SERPL-SCNC: 4.4 MMOL/L — SIGNIFICANT CHANGE UP (ref 3.5–5.3)
RBC # BLD: 2.71 M/UL — LOW (ref 4.2–5.8)
RBC # FLD: 14.6 % — HIGH (ref 10.3–14.5)
SODIUM SERPL-SCNC: 131 MMOL/L — LOW (ref 135–145)
WBC # BLD: 5.84 K/UL — SIGNIFICANT CHANGE UP (ref 3.8–10.5)
WBC # FLD AUTO: 5.84 K/UL — SIGNIFICANT CHANGE UP (ref 3.8–10.5)

## 2021-10-20 PROCEDURE — 93005 ELECTROCARDIOGRAM TRACING: CPT

## 2021-10-20 PROCEDURE — 36430 TRANSFUSION BLD/BLD COMPNT: CPT

## 2021-10-20 PROCEDURE — 86900 BLOOD TYPING SEROLOGIC ABO: CPT

## 2021-10-20 PROCEDURE — 82009 KETONE BODYS QUAL: CPT

## 2021-10-20 PROCEDURE — 87040 BLOOD CULTURE FOR BACTERIA: CPT

## 2021-10-20 PROCEDURE — 83735 ASSAY OF MAGNESIUM: CPT

## 2021-10-20 PROCEDURE — U0003: CPT

## 2021-10-20 PROCEDURE — 36415 COLL VENOUS BLD VENIPUNCTURE: CPT

## 2021-10-20 PROCEDURE — 82550 ASSAY OF CK (CPK): CPT

## 2021-10-20 PROCEDURE — 84484 ASSAY OF TROPONIN QUANT: CPT

## 2021-10-20 PROCEDURE — 85025 COMPLETE CBC W/AUTO DIFF WBC: CPT

## 2021-10-20 PROCEDURE — 87086 URINE CULTURE/COLONY COUNT: CPT

## 2021-10-20 PROCEDURE — 96360 HYDRATION IV INFUSION INIT: CPT

## 2021-10-20 PROCEDURE — 86923 COMPATIBILITY TEST ELECTRIC: CPT

## 2021-10-20 PROCEDURE — P9016: CPT

## 2021-10-20 PROCEDURE — 51705 CHANGE OF BLADDER TUBE: CPT

## 2021-10-20 PROCEDURE — 99232 SBSQ HOSP IP/OBS MODERATE 35: CPT

## 2021-10-20 PROCEDURE — 82962 GLUCOSE BLOOD TEST: CPT

## 2021-10-20 PROCEDURE — 81001 URINALYSIS AUTO W/SCOPE: CPT

## 2021-10-20 PROCEDURE — 86901 BLOOD TYPING SEROLOGIC RH(D): CPT

## 2021-10-20 PROCEDURE — 85014 HEMATOCRIT: CPT

## 2021-10-20 PROCEDURE — 82553 CREATINE MB FRACTION: CPT

## 2021-10-20 PROCEDURE — 0225U NFCT DS DNA&RNA 21 SARSCOV2: CPT

## 2021-10-20 PROCEDURE — 71045 X-RAY EXAM CHEST 1 VIEW: CPT

## 2021-10-20 PROCEDURE — 85610 PROTHROMBIN TIME: CPT

## 2021-10-20 PROCEDURE — 80053 COMPREHEN METABOLIC PANEL: CPT

## 2021-10-20 PROCEDURE — 85730 THROMBOPLASTIN TIME PARTIAL: CPT

## 2021-10-20 PROCEDURE — U0005: CPT

## 2021-10-20 PROCEDURE — 84100 ASSAY OF PHOSPHORUS: CPT

## 2021-10-20 PROCEDURE — 83036 HEMOGLOBIN GLYCOSYLATED A1C: CPT

## 2021-10-20 PROCEDURE — 86769 SARS-COV-2 COVID-19 ANTIBODY: CPT

## 2021-10-20 PROCEDURE — 86850 RBC ANTIBODY SCREEN: CPT

## 2021-10-20 PROCEDURE — 96372 THER/PROPH/DIAG INJ SC/IM: CPT | Mod: XU

## 2021-10-20 PROCEDURE — 85027 COMPLETE CBC AUTOMATED: CPT

## 2021-10-20 PROCEDURE — 85018 HEMOGLOBIN: CPT

## 2021-10-20 PROCEDURE — 80048 BASIC METABOLIC PNL TOTAL CA: CPT

## 2021-10-20 PROCEDURE — 99285 EMERGENCY DEPT VISIT HI MDM: CPT

## 2021-10-20 RX ORDER — METOPROLOL TARTRATE 50 MG
1 TABLET ORAL
Qty: 30 | Refills: 0
Start: 2021-10-20 | End: 2021-11-18

## 2021-10-20 RX ORDER — AMLODIPINE BESYLATE 2.5 MG/1
1 TABLET ORAL
Qty: 0 | Refills: 0 | DISCHARGE

## 2021-10-20 RX ORDER — AMINOCAPROIC ACID 500 MG/1
1 TABLET ORAL
Qty: 120 | Refills: 0
Start: 2021-10-20 | End: 2021-11-18

## 2021-10-20 RX ADMIN — Medication 12.5 MILLIGRAM(S): at 06:09

## 2021-10-20 RX ADMIN — AMIODARONE HYDROCHLORIDE 200 MILLIGRAM(S): 400 TABLET ORAL at 06:09

## 2021-10-20 RX ADMIN — AMINOCAPROIC ACID 500 MILLIGRAM(S): 500 TABLET ORAL at 06:09

## 2021-10-20 RX ADMIN — AMINOCAPROIC ACID 500 MILLIGRAM(S): 500 TABLET ORAL at 00:31

## 2021-10-20 RX ADMIN — AMINOCAPROIC ACID 500 MILLIGRAM(S): 500 TABLET ORAL at 11:25

## 2021-10-20 NOTE — PROGRESS NOTE ADULT - PROBLEM SELECTOR PROBLEM 5
RANDOLPH (acute kidney injury)

## 2021-10-20 NOTE — PROGRESS NOTE ADULT - SUBJECTIVE AND OBJECTIVE BOX
Patient is a 86y old  Male who presents with a chief complaint of hematuria (20 Oct 2021 09:26)      INTERVAL HPI/OVERNIGHT EVENTS: Patient seen and examined. NAD. No complaints.    Vital Signs Last 24 Hrs  T(C): 37.1 (20 Oct 2021 05:27), Max: 37.1 (20 Oct 2021 05:27)  T(F): 98.8 (20 Oct 2021 05:27), Max: 98.8 (20 Oct 2021 05:27)  HR: 88 (20 Oct 2021 05:27) (85 - 88)  BP: 129/68 (20 Oct 2021 05:27) (121/60 - 129/68)  BP(mean): --  RR: 19 (20 Oct 2021 05:27) (19 - 20)  SpO2: 95% (20 Oct 2021 05:27) (93% - 97%)    10-20    131<L>  |  100  |  19  ----------------------------<  103<H>  4.4   |  27  |  1.30    Ca    7.6<L>      20 Oct 2021 07:18  Mg     2.0     10-20                            7.9    5.84  )-----------( 277      ( 20 Oct 2021 07:18 )             24.4       CAPILLARY BLOOD GLUCOSE      POCT Blood Glucose.: 88 mg/dL (20 Oct 2021 07:24)  POCT Blood Glucose.: 151 mg/dL (19 Oct 2021 20:58)  POCT Blood Glucose.: 125 mg/dL (19 Oct 2021 16:38)  POCT Blood Glucose.: 87 mg/dL (19 Oct 2021 11:47)              acetaminophen   Tablet .. 650 milliGRAM(s) Oral every 6 hours PRN  aluminum hydroxide/magnesium hydroxide/simethicone Suspension 30 milliLiter(s) Oral every 4 hours PRN  aminocaproic acid Tablet 500 milliGRAM(s) Oral every 6 hours  aMIOdarone    Tablet 200 milliGRAM(s) Oral daily  dextrose 40% Gel 15 Gram(s) Oral once  dextrose 5%. 1000 milliLiter(s) IV Continuous <Continuous>  dextrose 5%. 1000 milliLiter(s) IV Continuous <Continuous>  dextrose 50% Injectable 25 Gram(s) IV Push once  dextrose 50% Injectable 12.5 Gram(s) IV Push once  dextrose 50% Injectable 25 Gram(s) IV Push once  glucagon  Injectable 1 milliGRAM(s) IntraMuscular once  influenza   Vaccine 0.5 milliLiter(s) IntraMuscular once  insulin glargine Injectable (LANTUS) 8 Unit(s) SubCutaneous at bedtime  insulin lispro (ADMELOG) corrective regimen sliding scale   SubCutaneous three times a day before meals  insulin lispro (ADMELOG) corrective regimen sliding scale   SubCutaneous at bedtime  melatonin 3 milliGRAM(s) Oral at bedtime PRN  metoprolol tartrate 12.5 milliGRAM(s) Oral every 12 hours  ondansetron Injectable 4 milliGRAM(s) IV Push every 8 hours PRN  polyethylene glycol 3350 17 Gram(s) Oral daily PRN              REVIEW OF SYSTEMS:  CONSTITUTIONAL: No fever, no weight loss, or no fatigue  NECK: No pain, no stiffness  RESPIRATORY: No cough, no wheezing, no chills, no hemoptysis, No shortness of breath  CARDIOVASCULAR: No chest pain, no palpitations, no dizziness, no leg swelling  GASTROINTESTINAL: No abdominal pain. No nausea, no vomiting, no hematemesis; No diarrhea, no constipation. No melena, no hematochezia.  GENITOURINARY: No dysuria, no frequency, no hematuria, no incontinence  NEUROLOGICAL: No headaches, no loss of strength, no numbness, no tremors  SKIN: No itching, no burning  MUSCULOSKELETAL: No joint pain, no swelling; No muscle, no back, no extremity pain  PSYCHIATRIC: No depression, no mood swings,   HEME/LYMPH: No easy bruising, no bleeding gums  ALLERY AND IMMUNOLOGIC: No hives       Consultant(s) Notes Reviewed:  [X] YES  [ ] NO    PHYSICAL EXAM:  GENERAL: NAD  HEAD:  Atraumatic, Normocephalic  EYES: EOMI, PERRLA, conjunctiva and sclera clear  ENMT: No tonsillar erythema, exudates, or enlargement; Moist mucous membranes  NECK: Supple, No JVD  NERVOUS SYSTEM:  Awake & alert  CHEST/LUNG: Clear to auscultation bilaterally; No rales, rhonchi, wheezing,  HEART: Regular rate and rhythm  ABDOMEN: Soft, Nontender, Nondistended; Bowel sounds present  EXTREMITIES:  No clubbing, cyanosis, or edema  LYMPH: No lymphadenopathy noted  SKIN: No rashes      Advanced care planning discussed with patient/family [X] YES   [ ] NO    Advanced care planning discussed with patient/family. Patient's health status was discussed. All appropriate changes have been made regarding patient's end-of-life care. Advanced care planning forms reviewed/discussed/completed.  20 minutes spent.

## 2021-10-20 NOTE — PROGRESS NOTE ADULT - SUBJECTIVE AND OBJECTIVE BOX
Patient is a 86y old  Male who presents with a chief complaint of hematuria (13 Oct 2021 12:27)  Patient seen in follow up for RANDOLPH.        PAST MEDICAL HISTORY:  Diabetes mellitus      MEDICATIONS  (STANDING):  aminocaproic acid Tablet 500 milliGRAM(s) Oral every 6 hours  aMIOdarone    Tablet 200 milliGRAM(s) Oral daily  dextrose 40% Gel 15 Gram(s) Oral once  dextrose 5%. 1000 milliLiter(s) (50 mL/Hr) IV Continuous <Continuous>  dextrose 5%. 1000 milliLiter(s) (100 mL/Hr) IV Continuous <Continuous>  dextrose 50% Injectable 25 Gram(s) IV Push once  dextrose 50% Injectable 12.5 Gram(s) IV Push once  dextrose 50% Injectable 25 Gram(s) IV Push once  glucagon  Injectable 1 milliGRAM(s) IntraMuscular once  influenza   Vaccine 0.5 milliLiter(s) IntraMuscular once  insulin glargine Injectable (LANTUS) 8 Unit(s) SubCutaneous at bedtime  insulin lispro (ADMELOG) corrective regimen sliding scale   SubCutaneous three times a day before meals  insulin lispro (ADMELOG) corrective regimen sliding scale   SubCutaneous at bedtime  metoprolol tartrate 12.5 milliGRAM(s) Oral every 12 hours    MEDICATIONS  (PRN):  acetaminophen   Tablet .. 650 milliGRAM(s) Oral every 6 hours PRN Temp greater or equal to 38C (100.4F), Mild Pain (1 - 3)  aluminum hydroxide/magnesium hydroxide/simethicone Suspension 30 milliLiter(s) Oral every 4 hours PRN Dyspepsia  melatonin 3 milliGRAM(s) Oral at bedtime PRN Insomnia  ondansetron Injectable 4 milliGRAM(s) IV Push every 8 hours PRN Nausea and/or Vomiting  polyethylene glycol 3350 17 Gram(s) Oral daily PRN Constipation    T(C): 37.1 (10-20-21 @ 05:27), Max: 37.1 (10-18-21 @ 12:37)  HR: 88 (10-20-21 @ 05:27) (62 - 112)  BP: 129/68 (10-20-21 @ 05:27) (119/72 - 145/76)  RR: 19 (10-20-21 @ 05:27)  SpO2: 95% (10-20-21 @ 05:27)  Wt(kg): --  I&O's Detail    19 Oct 2021 07:01  -  20 Oct 2021 07:00  --------------------------------------------------------  IN:    Oral Fluid: 320 mL  Total IN: 320 mL    OUT:    Voided (mL): 1200 mL  Total OUT: 1200 mL    Total NET: -880 mL                PHYSICAL EXAM:  General: No distress  Respiratory: b/l air entry  Cardiovascular: S1 S2  Gastrointestinal: soft  Extremities:  edema           LABORATORY:                        7.9    5.84  )-----------( 277      ( 20 Oct 2021 07:18 )             24.4     10-20    131<L>  |  100  |  19  ----------------------------<  103<H>  4.4   |  27  |  1.30    Ca    7.6<L>      20 Oct 2021 07:18  Mg     2.0     10-20      Sodium, Serum: 131 mmol/L (10-20 @ 07:18)  Sodium, Serum: 134 mmol/L (10-19 @ 08:05)    Potassium, Serum: 4.4 mmol/L (10-20 @ 07:18)  Potassium, Serum: 4.2 mmol/L (10-19 @ 08:05)    Hemoglobin: 7.9 g/dL (10-20 @ 07:18)  Hemoglobin: 8.2 g/dL (10-19 @ 08:05)  Hemoglobin: 8.4 g/dL (10-18 @ 07:38)    Creatinine, Serum 1.30 (10-20 @ 07:18)  Creatinine, Serum 1.40 (10-19 @ 08:05)  Creatinine, Serum 1.50 (10-18 @ 07:38)

## 2021-10-20 NOTE — PROGRESS NOTE ADULT - ASSESSMENT
RANDOLPH on CKD 3: Crea 2.2 07/2021, Crea 1.3 02/2021  Hematuria, s/p recent TURP  A fib  Hypertension  Diabetes  Anemia    Improved renal indices. Low h/h. For PRBC transfusion. To continue current meds. Monitor blood sugar levels. Insulin coverage as needed.   Dietary restriction. Monitor h/h trend. Transfuse PRN. Monitor BP trend. Titrate BP meds as needed. Salt restriction.   Will follow electrolytes and renal function trend.  follow up.

## 2021-10-20 NOTE — PROGRESS NOTE ADULT - SUBJECTIVE AND OBJECTIVE BOX
[facility] -- Ruben Matthews, Betsy, Jose, Rony Dennis Savella  Office # [phone]      Follow Up:  le edema     Subjective/Observations:     No events overnight resting comfortably in bed.  No complaints of chest pain, dyspnea, or palpitations reported. No signs of orthopnea or PND.    REVIEW OF SYSTEMS: All other review of systems is negative unless indicated above    PAST MEDICAL & SURGICAL HISTORY:  Diabetes mellitus    History of aortic valve replacement    CAD (coronary artery disease)  cardiac stent        MEDICATIONS  (STANDING):  aminocaproic acid Tablet 500 milliGRAM(s) Oral every 6 hours  aMIOdarone    Tablet 200 milliGRAM(s) Oral daily  dextrose 40% Gel 15 Gram(s) Oral once  dextrose 5%. 1000 milliLiter(s) (50 mL/Hr) IV Continuous <Continuous>  dextrose 5%. 1000 milliLiter(s) (100 mL/Hr) IV Continuous <Continuous>  dextrose 50% Injectable 25 Gram(s) IV Push once  dextrose 50% Injectable 12.5 Gram(s) IV Push once  dextrose 50% Injectable 25 Gram(s) IV Push once  glucagon  Injectable 1 milliGRAM(s) IntraMuscular once  influenza   Vaccine 0.5 milliLiter(s) IntraMuscular once  insulin glargine Injectable (LANTUS) 8 Unit(s) SubCutaneous at bedtime  insulin lispro (ADMELOG) corrective regimen sliding scale   SubCutaneous three times a day before meals  insulin lispro (ADMELOG) corrective regimen sliding scale   SubCutaneous at bedtime  metoprolol tartrate 12.5 milliGRAM(s) Oral every 12 hours    MEDICATIONS  (PRN):  acetaminophen   Tablet .. 650 milliGRAM(s) Oral every 6 hours PRN Temp greater or equal to 38C (100.4F), Mild Pain (1 - 3)  aluminum hydroxide/magnesium hydroxide/simethicone Suspension 30 milliLiter(s) Oral every 4 hours PRN Dyspepsia  melatonin 3 milliGRAM(s) Oral at bedtime PRN Insomnia  ondansetron Injectable 4 milliGRAM(s) IV Push every 8 hours PRN Nausea and/or Vomiting  polyethylene glycol 3350 17 Gram(s) Oral daily PRN Constipation      Allergies    No Known Allergies    Intolerances        Vital Signs Last 24 Hrs  T(C): 37.1 (20 Oct 2021 05:27), Max: 37.1 (20 Oct 2021 05:27)  T(F): 98.8 (20 Oct 2021 05:27), Max: 98.8 (20 Oct 2021 05:27)  HR: 88 (20 Oct 2021 05:27) (85 - 88)  BP: 129/68 (20 Oct 2021 05:27) (121/60 - 129/68)  BP(mean): --  RR: 19 (20 Oct 2021 05:27) (19 - 20)  SpO2: 95% (20 Oct 2021 05:27) (93% - 97%)    I&O's Summary    19 Oct 2021 07:01  -  20 Oct 2021 07:00  --------------------------------------------------------  IN: 320 mL / OUT: 1200 mL / NET: -880 mL          PHYSICAL EXAM:  TELE: af   Constitutional: NAD, awake and alert, well-developed  HEENT: Moist Mucous Membranes, Anicteric  Pulmonary: Non-labored, breath sounds are clear bilaterally, No wheezing, crackles or rhonchi  Cardiovascular: IRegular, S1 and S2 nl, + murmur   Gastrointestinal: Bowel Sounds present, soft, nontender.   Lymph: + peripheral edema.  Skin: No visible rashes or ulcers.  Psych:  Mood & affect appropriate    LABS: All Labs Reviewed:                        7.9    5.84  )-----------( 277      ( 20 Oct 2021 07:18 )             24.4                         8.2    6.96  )-----------( 282      ( 19 Oct 2021 08:05 )             24.9                         8.4    7.60  )-----------( 261      ( 18 Oct 2021 07:38 )             25.6     20 Oct 2021 07:18    131    |  100    |  19     ----------------------------<  103    4.4     |  27     |  1.30   19 Oct 2021 08:05    134    |  101    |  18     ----------------------------<  84     4.2     |  28     |  1.40   18 Oct 2021 07:38    132    |  99     |  22     ----------------------------<  132    4.2     |  27     |  1.50     Ca    7.6        20 Oct 2021 07:18  Ca    7.7        19 Oct 2021 08:05  Ca    8.0        18 Oct 2021 07:38  Mg     2.0       20 Oct 2021 07:18  Mg     1.9       19 Oct 2021 08:05  Mg     2.0       18 Oct 2021 07:38          12 Lead ECG:   Ventricular Rate 99 BPM    Atrial Rate 267 BPM    QRS Duration 104 ms    Q-T Interval 376 ms    QTC Calculation(Bazett) 482 ms    R Axis -54 degrees    T Axis 126 degrees    Diagnosis Line Atrial fibrillation  Left axis deviation  Inferior infarct , age undetermined  Anterior infarct (cited on or before 21-FEB-2021)  Abnormal ECG  When compared with ECG of 12-OCT-2021 03:46, (Unconfirmed)  QRS axis shifted left  Inferior infarct is now present  T wave inversion no longer evident in Inferior leads  Confirmed by William Garcia MD ([id number]) on 10/12/2021 2:02:07 PM (10-12-21 @ 11:08)    < from: Transesophageal Echocardiogram (07.09.21 @ 10:21) >    Patient name: VY SHETH  YOB: 1935   Age: 86 (M)   MR#: [id number]  Study Date: 7/9/2021  Location: O/PSonographer: Bridget Callahan RDCS  2nd Sonographer: Yovanny Gillis M.D.  Study quality: Technically fair  Referring Physician: Demetrius Walker MD  Blood Pressure: 136/67 mmHg  Height: 170 cm  Weight: 84 kg  BSA: 2 m2  ------------------------------------------------------------------------  PROCEDURE: Transesophageal and transthoracic  echocardiograms with 2-D, M-Mode and complete spectral and  color flow Doppler were performed.  Informed consent was  first obtained for BILLY. The patient was sedated - see  anesthesia record.  The procedure was monitored with  automatic blood pressure monitoring, ECG tracings and pulse  oximetry.  The transesophageal probe was placed in the  esophagus posterior to the heart without complications.  INDICATION: Unspecified atrial fibrillation (I48.91)  ------------------------------------------------------------------------  Dimensions:    Normal Values:  LA:     4.7    2.0 - 4.0 cm  Ao:     3.4    2.0 - 3.8 cm  SEPTUM: 1.6    0.6 - 1.2 cm  PWT:    1.4    0.6 - 1.1 cm  LVIDd:  4.4    3.0 - 5.6 cm  LVIDs:  2.9    1.8 - 4.0 cm  Derived variables:  LVMI: 136 g/m2  RWT: 0.63  Fractional short: 34 %  EF (Visual Estimate): 60-65 %  Doppler Peak Velocity (m/sec): AoV=1.7  ------------------------------------------------------------------------  Observations:  Mitral Valve: Mitral annular calcification, otherwise  normal mitral valve. Mild mitral regurgitation.  Aortic Valve/Aorta: Bioprosthetic aortic valve. Peak  transaortic valve gradient equals 12 mm Hg, mean  transaortic valve gradient equals 6 mm Hg, which is  probably normal in the presence of a bioprosthetic aortic  valve. Mild aortic regurgitation.  Peak left ventricular  outflow tract gradient equals 7 mm Hg, LVOT velocity time  integral equals 29 cm.  Aortic Root: 3.5 cm.  LVOT diameter: 2.2 cm.  Complex atheroma noted in aortic arch/descending aorta.  Left Atrium: Moderately dilated left atrium.  LA volume  index = 43 cc/m2.   Spontaneous echo contrast seen.   No  left atrial or left atrial appendage thrombus.   Decreased  left atrial appendage velocities noted.  Left Ventricle: Normal left ventricular systolic function.  No segmental wall motion abnormalities. Moderate concentric  left ventricular hypertrophy.  Right Heart: Severe right atrial enlargement. Right  ventricular enlargement with normal right ventricular  systolic function. Normal tricuspid valve. Severe tricuspid  regurgitation. Normal pulmonic valve.  Pericardium/Pleura: Normal pericardium with no pericardial  effusion.  Hemodynamic: Estimated right atrial pressure is 8 mm Hg.  Estimated right ventricular systolic pressure equals 44 mm  Hg, assuming right atrial pressure equals 8 mm Hg,  consistent with mild pulmonary hypertension. Agitated  saline injection and color flow Doppler demonstrates no  evidence of a patent foramen ovale.  ------------------------------------------------------------------------  Conclusions:  1. Bioprosthetic aortic valve. Peak transaortic valve  gradient equals 12 mm Hg, mean transaortic valve gradient  equals 6 mm Hg, which is probably normal in the presence of  a bioprosthetic aortic valve.  2. Aortic Root: 3.5 cm.  LVOT diameter: 2.2 cm.  Complex atheroma noted in aortic arch/descending aorta.  3. Moderately dilated left atrium.  LA volume index = 43  cc/m2.   Spontaneous echo contrast seen.   No left atrial  or left atrial appendage thrombus.  Decreased left atrial  appendage velocities noted.  4. Moderate concentric left ventricular hypertrophy.  5. Normal left ventricular systolic function. No segmental  wall motion abnormalities.  6. Severe right atrial enlargement.  7. Right ventricular enlargement with normal right  ventricular systolic function.  8. Normal tricuspid valve. Severe tricuspid regurgitation.  9. Estimated pulmonary artery systolic pressure equals 44  mm Hg, assuming right atrial pressure equals 8 mm Hg,  consistentwith mild pulmonary pressures.  ------------------------------------------------------------------------  Confirmed on  7/9/2021 - 11:49:32 by ANAMIKA Horne  ------------------------------------------------------------------------    < end of copied text >    < from: US Duplex Venous Lower Ext Complete, Bilateral (02.21.21 @ 15:32) >  IMPRESSION:  No evidence of deep venous thrombosis in either lower extremity.  < end of copied text >    < from: Xray Chest 1 View- PORTABLE-Urgent (Xray Chest 1 View- PORTABLE-Urgent .) (10.11.21 @ 22:08) >  IMPRESSION:   No radiographic evidence of active chest disease.

## 2021-10-20 NOTE — PROGRESS NOTE ADULT - PROBLEM SELECTOR PLAN 6
Restart meds as tolerated

## 2021-10-20 NOTE — DISCHARGE NOTE PROVIDER - NSDCMRMEDTOKEN_GEN_ALL_CORE_FT
amiodarone 200 mg oral tablet: 1 tab(s) orally once a day  amLODIPine 5 mg oral tablet: 1 tab(s) orally once a day  Aspir 81 oral delayed release tablet: 1 tab(s) orally once a day  Co-Q10: 1 tab(s) orally once a day  Eliquis 5 mg oral tablet: 1 tab(s) orally 2 times a day  Flomax 0.4 mg oral capsule: 1 cap(s) orally once a day  furosemide 80 mg oral tablet: 1 tab(s) orally once a day  glimepiride 4 mg oral tablet: 1 tab(s) orally 2 times a day  glucosamine: 1 tab(s) orally once a day  metFORMIN 1000 mg oral tablet: 1 tab(s) orally 2 times a day  multivitamin: 1 tab(s) orally once a day   aminocaproic acid 500 mg oral tablet: 1 tab(s) orally every 6 hours  amiodarone 200 mg oral tablet: 1 tab(s) orally once a day  Aspir 81 oral delayed release tablet: 1 tab(s) orally once a day  Co-Q10: 1 tab(s) orally once a day  Eliquis 5 mg oral tablet: 1 tab(s) orally 2 times a day  Flomax 0.4 mg oral capsule: 1 cap(s) orally once a day  furosemide 80 mg oral tablet: 1 tab(s) orally once a day  glimepiride 4 mg oral tablet: 1 tab(s) orally 2 times a day  glucosamine: 1 tab(s) orally once a day  metFORMIN 1000 mg oral tablet: 1 tab(s) orally 2 times a day  multivitamin: 1 tab(s) orally once a day  Toprol-XL 25 mg oral tablet, extended release: 1 tab(s) orally once a day

## 2021-10-20 NOTE — DISCHARGE NOTE NURSING/CASE MANAGEMENT/SOCIAL WORK - PATIENT PORTAL LINK FT
You can access the FollowMyHealth Patient Portal offered by [facility] by registering at the following website: [email or web address]. By joining [employer]’s FollowMyHealth portal, you will also be able to view your health information using other applications (apps) compatible with our system.

## 2021-10-20 NOTE — PROGRESS NOTE ADULT - SUBJECTIVE AND OBJECTIVE BOX
INTERVAL HPI/OVERNIGHT EVENTS: sherman is out, voiding clear urine w/out difficulty    MEDICATIONS  (STANDING):  aminocaproic acid Tablet 500 milliGRAM(s) Oral every 6 hours  aMIOdarone    Tablet 200 milliGRAM(s) Oral daily  dextrose 40% Gel 15 Gram(s) Oral once  dextrose 5%. 1000 milliLiter(s) (50 mL/Hr) IV Continuous <Continuous>  dextrose 5%. 1000 milliLiter(s) (100 mL/Hr) IV Continuous <Continuous>  dextrose 50% Injectable 25 Gram(s) IV Push once  dextrose 50% Injectable 12.5 Gram(s) IV Push once  dextrose 50% Injectable 25 Gram(s) IV Push once  glucagon  Injectable 1 milliGRAM(s) IntraMuscular once  influenza   Vaccine 0.5 milliLiter(s) IntraMuscular once  insulin glargine Injectable (LANTUS) 8 Unit(s) SubCutaneous at bedtime  insulin lispro (ADMELOG) corrective regimen sliding scale   SubCutaneous three times a day before meals  insulin lispro (ADMELOG) corrective regimen sliding scale   SubCutaneous at bedtime  metoprolol tartrate 12.5 milliGRAM(s) Oral every 12 hours    MEDICATIONS  (PRN):  acetaminophen   Tablet .. 650 milliGRAM(s) Oral every 6 hours PRN Temp greater or equal to 38C (100.4F), Mild Pain (1 - 3)  aluminum hydroxide/magnesium hydroxide/simethicone Suspension 30 milliLiter(s) Oral every 4 hours PRN Dyspepsia  melatonin 3 milliGRAM(s) Oral at bedtime PRN Insomnia  ondansetron Injectable 4 milliGRAM(s) IV Push every 8 hours PRN Nausea and/or Vomiting  polyethylene glycol 3350 17 Gram(s) Oral daily PRN Constipation        Vital Signs Last 24 Hrs  T(C): 37.1 (20 Oct 2021 05:27), Max: 37.1 (20 Oct 2021 05:27)  T(F): 98.8 (20 Oct 2021 05:27), Max: 98.8 (20 Oct 2021 05:27)  HR: 88 (20 Oct 2021 05:27) (85 - 88)  BP: 129/68 (20 Oct 2021 05:27) (121/60 - 129/68)  BP(mean): --  RR: 19 (20 Oct 2021 05:27) (19 - 20)  SpO2: 95% (20 Oct 2021 05:27) (93% - 97%)        LABS:                        8.2    6.96  )-----------( 282      ( 19 Oct 2021 08:05 )             24.9     10-19    134<L>  |  101  |  18  ----------------------------<  84  4.2   |  28  |  1.40<H>    Ca    7.7<L>      19 Oct 2021 08:05  Mg     1.9     10-19              RADIOLOGY & ADDITIONAL TESTS:

## 2021-10-20 NOTE — DISCHARGE NOTE PROVIDER - CARE PROVIDER_API CALL
Martinez Rush)  Critical Care Medicine; Internal Medicine; Pulmonary Disease  [address] [facility], [address]  [place] [address]  Phone: [phone]  Fax: [phone]  Established Patient  Follow Up Time: 1 week    nathan,   Phone: (   )    -  Fax: (   )    -  Follow Up Time: 1 week

## 2021-10-20 NOTE — PROGRESS NOTE ADULT - PROBLEM SELECTOR PROBLEM 3
Anemia due to acute blood loss

## 2021-10-20 NOTE — PROGRESS NOTE ADULT - PROBLEM SELECTOR PLAN 1
Continue CBI  Monitor H/H  Transfuse prn   f/u  Further work-up/management pending clinical course.
will try to d/c cbi again later today
Continue CBI  Monitor H/H  Transfuse prn  Started on Amicar   f/u  Further work-up/management pending clinical course.
Continue CBI  Monitor H/H  Transfuse prn  Started on Amicar   f/u  Further work-up/management pending clinical course.
Improving on Amicar. CBI clamped.
Slightly improving. Amicar 500 mg q 6 hours ordered. Will continue to monitor. Cystoscopy and fulguration of bleeding may be necessary if bleeding persists.
cont admelog correctional scale coverage qac/qhs   cont cons cho diet  goal bg 100-180 in hosp setting
cont lantus 15 units qhs  cont mod dose admelog corrective scale coverage  add admelog 5 units 3x/day before meals  cont cons cho diet  goal bg 100-180 in hosp setting
resolved. Vinson is out, pt is voiding well. Would continue amicar on d/c and have pt f/u with his urologist shortly (within a week) after d/c. We will f/u here as needed
s/p turp. Continue sherman and cbi and f/u H/H
Continue CBI and catheter irrigation prn. Monitor CBC.
oral anti-diabetes agents on hold  cont cons cho diet  increase lantus 15 units qhs  cont mod dose admelog corrective scale coverage qac/qhs  goal bg 100-180 in hosp setting
received 1 u prbc yesterday, hematuria seems improved, continue cbi, await am labs
Continue CBI  Monitor H/H  Transfuse prn  Started on Amicar   f/u  Further work-up/management pending clinical course.
Resolved on Amicar. Vinson irrigated, small amount of old clot evacuated and Vinson removed. Continue Amicar. Anticipate discharge tomorrow if voiding well and stable medically.
cont lantus 8 units qhs  cont mod dose admelog corrective scale coverage qac/qhs  cont cons cho diet  goal bg 100-180 in hosp setting
d/c standing pre-meal admelog   decrease lantus 8 units qhs  goal bg 100-180 in hosp setting
Off CBI  Voiding well  Continue Amicar  d/c home after 1 unit prbc
Off CBI  Monitor H/H  Transfuse prn  Started on Amicar   f/u  Further work-up/management pending clinical course.
Continue CBI  Monitor H/H  Transfuse prn   f/u  Further work-up/management pending clinical course.
Continue CBI  Monitor H/H  Transfuse prn   f/u  Further work-up/management pending clinical course.

## 2021-10-20 NOTE — PROGRESS NOTE ADULT - PROBLEM SELECTOR PLAN 4
Restart home meds as tolerated  Hold eliquis due to hematuria  Monitor on tele  CE flat  Cardio f/u
Restart home meds as tolerated
Restart home meds as tolerated  Hold eliquis due to hematuria  Monitor on tele  CE flat  Cardio f/u

## 2021-10-20 NOTE — PROGRESS NOTE ADULT - PROBLEM SELECTOR PROBLEM 2
Uncontrolled diabetes mellitus with hyperglycemia

## 2021-10-20 NOTE — PROGRESS NOTE ADULT - PROBLEM SELECTOR PLAN 5
2/2 hematuria  Monitor Cr  Avoid nephrotoxic medications  Renal f/u  Further work-up/management pending clinical course.
2/2 hematuria  Resolved  Monitor Cr  Avoid nephrotoxic medications  Renal f/u
2/2 hematuria  Monitor Cr  Avoid nephrotoxic medications  Renal f/u  Further work-up/management pending clinical course.

## 2021-10-20 NOTE — PROGRESS NOTE ADULT - PROBLEM SELECTOR PLAN 3
2/2 to hematuria  Monitor h/h  S/P 2 units prbc  Maintain hg > 8.0
2/2 to hematuria  Monitor h/h  S/P 3 units prbc  Maintain hg > 8.0
2/2 to hematuria  Monitor h/h  S/P 2 units prbc  Transfuse 1 more unit today  Maintain hg > 8.0
2/2 to hematuria  Monitor h/h  S/P 3 units prbc  Transfuse 1 more unit prior to discharge  Maintain hg > 8.0

## 2021-10-20 NOTE — PROGRESS NOTE ADULT - PROVIDER SPECIALTY LIST ADULT
Cardiology
Endocrinology
Nephrology
Nephrology
Urology
Urology
Cardiology
Endocrinology
Endocrinology
Nephrology
Urology
Cardiology
Nephrology
Nephrology
Urology
Cardiology
Nephrology
Nephrology
Cardiology
Internal Medicine
Urology
Endocrinology
Endocrinology
Urology
Internal Medicine

## 2021-10-20 NOTE — PROGRESS NOTE ADULT - ATTENDING COMMENTS
85 yo male with PMH afib (on Eliquis, s/p BILYL cardioversion 7/2021), BPH, CAD (s/p stent 2005), Aortic valve replacement (2001) presents to ED hematuria found to be in Afib with RVR.     Chronic Afib  - S/P BILLY cardioversion 7/2021.  On home Eliquis.   - Presented with hematuria, now on CBI.  Continue to hold Eliquis for now and resume when cleared by Uro  - Rapid atrial fibrillation on admission likely from missed bb dose for the last few days, overnight AF no events   - Continue BB.  On home Amio.  However, in the setting of persistent Afib is being used more as rate control.
87 yo male with PMH afib (on Eliquis, s/p BILLY cardioversion 7/2021), BPH, CAD (s/p stent 2005), Aortic valve replacement (2001) presents to ED hematuria found to be in Afib with RVR.     Chronic Afib  - Presented with hematuria, now on CBI.  Continue to hold Eliquis for now and resume when cleared by Uro  - Continue BB.  On home Amio.  However, in the setting of persistent Afib is being used more as rate control.  Follows with Dr. Miranda  - Monitor electrolytes, replete to keep K>4 and Mag>2   - IV lasix
LE edema worsening.  Add back home dose of Lasix.  Follow up renal.
rate controlled af, cont meds  vol ol, with stable though abnormal creat  resume diuretics and monitor for improvement
chart reviewed    Patient seen and examined    Agree with plan as outlined above    85 yo male with PMH afib (on Eliquis, s/p BILLY cardioversion 7/2021), BPH, CAD (s/p stent 2005), Aortic valve replacement (2001) presents to ED hematuria found to be in Afib with RVR.     Chronic Afib  - S/p BILLY cardioversion 7/2021  - On Eliquis however held 5 days prior to his urology procedure (?TURP) on 10/1/21  - Pt appears mildly overloaded on exam with LE edema however would hold off on diuresis in the setting of RANDOLPH  - rapid atrial fibrillation on admission likely from missed bb dose for the last few days  had dccv in the past, on amio  - was in af at the last office visit with Dr Miranda, and was on amio and in rate controlled af  - can cont amio for now, though ultimately if there is not going to be an attempt at restoration of sr, it should be transitioned off, with perhaps an alternative avn blocker added  - His BP is soft will start Metoprolol 12.5mg BID with hold parameters and titrate up as BP allows (to home dose of Metoprolol Succinate 50mg daily)  - resume ac when ok with gu  - mild demand troponin in the setting of tachycardia and severe anemia, trend trop to peak  will follow.
Chart reviewed    Patient seen and examined    Agree with plan as outlined above    87 yo male with PMH afib (on Eliquis, s/p BILLY cardioversion 7/2021), BPH, CAD (s/p stent 2005), Aortic valve replacement (2001) presents to ED hematuria found to be in Afib with RVR.     Chronic Afib  - S/P BILLY cardioversion 7/2021.  On home Eliquis.  On hold prior to his urology procedure (?TURP) on 10/1/21  - Presented with hematuria, now on CBI.  Continue to hold Eliquis for now and resume when cleared by Uro  - Rapid atrial fibrillation on admission likely from missed BB dose for the last few days, overnight AF no events   - Continue BB.  On home Amio.  However, in the setting of persistent Afib is being used more as rate control.  Follows with Dr. Miranda  - Monitor electrolytes, replete to keep K>4 and Mag>2   - Tele AFib with 5 beats NSVT, increase BB as tolerated (home dose Metoprolol Succinate 50 mg QD)    CAD s/p stents/Elevated trops  - Trops 0.186. CKs are flat, suggesting against acute atherosclerotic plaque rupture.  - EKG showing mild ST wave depressions  - Mild demand ischemia likely in the setting of acute anemia , to fu with Dr Miranda on discharge   - Transfuse as per primary to keep HCT > 26 given CAD   - Recent BILLY showed normal LVF, EF 60-65%, AVR, (bio), dilated LA, RVE with normal RVSF, severe SULMA, and severe TR  - B/L LE edema worsening as per patient , on Lasix 80mg at home has been held- resume home lasix   - Continue BB- increase to Lopressor 25 mg BID with parameters
Continue to hold full dose AC. Continue CBI.  Monitor h/h and transfuse as needed.  No evidence of volume overload or active ischemia.  To follow closely while admitted.
clearly vol ol. Cr is stable. I would consider IV lasix x 1 today if ok with renal. Still with hematuria. Cont cbi. fu with . cont bb and amio for AF. Further cardiac workup will depend on clinical course.

## 2021-10-20 NOTE — DISCHARGE NOTE PROVIDER - PROVIDER TOKENS
PROVIDER:[TOKEN:[[id number]],FOLLOWUP:[1 week],ESTABLISHEDPATIENT:[T]],FREE:[LAST:[nathan],PHONE:[(   )    -],FAX:[(   )    -],FOLLOWUP:[1 week]]

## 2021-10-20 NOTE — DISCHARGE NOTE PROVIDER - NSDCCPCAREPLAN_GEN_ALL_CORE_FT
PRINCIPAL DISCHARGE DIAGNOSIS  Diagnosis: Hematuria  Assessment and Plan of Treatment: Continue current medications  Follow-up with your urologist within 1 week

## 2021-10-20 NOTE — PROGRESS NOTE ADULT - PROBLEM SELECTOR PROBLEM 1
Uncontrolled diabetes mellitus with hyperglycemia
Uncontrolled diabetes mellitus with hyperglycemia
Gross hematuria
Uncontrolled diabetes mellitus with hyperglycemia
Uncontrolled diabetes mellitus with hyperglycemia
Gross hematuria
Uncontrolled diabetes mellitus with hyperglycemia
Gross hematuria

## 2021-10-20 NOTE — PROGRESS NOTE ADULT - ASSESSMENT
87 yo male with PMH afib (on Eliquis, s/p BILLY cardioversion 7/2021), BPH, CAD (s/p stent 2005), Aortic valve replacement (2001) presents to ED hematuria found to be in Afib with RVR.     Chronic Afib  - S/P BILLY cardioversion 7/2021.  On home Eliquis.  On hold prior to his urology procedure (?TURP) on 10/1/21  - Presented with hematuria, now sp CBI.  resume eliquis when cleared by urology -  -  On home Amio.  However, in the setting of persistent Afib is being used more as rate control.  Follows with Dr. Miranda    CAD s/p stents/Elevated trops  - Trops 0.186. CKs are flat, suggesting against acute atherosclerotic plaque rupture.  - EKG showing mild ST wave depressions  - Mild demand ischemia likely in the setting of acute anemia , to fu with Dr Miranda on discharge   - Transfuse as per primary to keep Hgb > 8 given CAD - drop in cbc noted today consider repeating     - Recent BILLY showed normal LVF, EF 60-65%, AVR, (bio), dilated LA, RVE with normal RVSF, severe SULMA, and severe TR  - B/L LE edema worsening as per patient , on Lasix 80mg at home has been held- resume home lasix   -tele afib periods of tachcyardia up to 115 bpm   - increase to Lopressor 25 mg BID with parameters    RANDOLPH on CKD  - Resume home Lasix   - Renal following    - Monitor and replete lytes, keep K>4, Mg>2.  - All other medical needs as per primary team.  - Other cardiovascular workup will depend on clinical course.  - Will continue to follow.  Torri Flores FNP-C  Cardiology NP  [facility] [phone]

## 2021-10-27 ENCOUNTER — APPOINTMENT (OUTPATIENT)
Dept: CARDIOLOGY | Facility: CLINIC | Age: 86
End: 2021-10-27
Payer: MEDICARE

## 2021-10-27 ENCOUNTER — NON-APPOINTMENT (OUTPATIENT)
Age: 86
End: 2021-10-27

## 2021-10-27 VITALS
BODY MASS INDEX: 27.64 KG/M2 | OXYGEN SATURATION: 94 % | HEIGHT: 66.5 IN | WEIGHT: 174 LBS | HEART RATE: 91 BPM | DIASTOLIC BLOOD PRESSURE: 66 MMHG | SYSTOLIC BLOOD PRESSURE: 152 MMHG

## 2021-10-27 DIAGNOSIS — R60.0 LOCALIZED EDEMA: ICD-10-CM

## 2021-10-27 DIAGNOSIS — I10 ESSENTIAL (PRIMARY) HYPERTENSION: ICD-10-CM

## 2021-10-27 DIAGNOSIS — I48.91 UNSPECIFIED ATRIAL FIBRILLATION: ICD-10-CM

## 2021-10-27 PROCEDURE — 93000 ELECTROCARDIOGRAM COMPLETE: CPT

## 2021-10-27 PROCEDURE — 99215 OFFICE O/P EST HI 40 MIN: CPT

## 2021-11-29 ENCOUNTER — NON-APPOINTMENT (OUTPATIENT)
Age: 86
End: 2021-11-29

## 2021-11-29 ENCOUNTER — APPOINTMENT (OUTPATIENT)
Dept: CARDIOLOGY | Facility: CLINIC | Age: 86
End: 2021-11-29

## 2021-12-07 ENCOUNTER — RX RENEWAL (OUTPATIENT)
Age: 86
End: 2021-12-07

## 2021-12-07 RX ORDER — APIXABAN 5 MG/1
5 TABLET, FILM COATED ORAL
Qty: 180 | Refills: 3 | Status: ACTIVE | COMMUNITY
Start: 2020-11-18

## 2022-03-02 NOTE — ED PROVIDER NOTE - HIV OFFER
Quality 431: Preventive Care And Screening: Unhealthy Alcohol Use - Screening: Patient screened for unhealthy alcohol use using a single question and scores less than 2 times per year Quality 402: Tobacco Use And Help With Quitting Among Adolescents: Patient screened for tobacco and never smoked Quality 130: Documentation Of Current Medications In The Medical Record: Current Medications Documented Detail Level: Detailed Quality 226: Preventive Care And Screening: Tobacco Use: Screening And Cessation Intervention: Patient screened for tobacco use and is an ex/non-smoker Opt out

## 2023-06-22 NOTE — PROGRESS NOTE ADULT - ASSESSMENT
85 yo male with PMH afib (on Eliquis, s/p BILLY cardioversion 7/2021), BPH, CAD (s/p stent 2005), Aortic valve replacement (2001) presents to ED hematuria found to be in Afib with RVR.     Chronic Afib  - S/p BILLY cardioversion 7/2021  - On Eliquis however held 5 days prior to his urology procedure (?TURP) on 10/1/21  - Pt appears mildly overloaded on exam with LE edema however would hold off on diuresis in the setting of RANDOLPH  - rapid atrial fibrillation on admission likely from missed bb dose for the last few days  had dccv in the past, on amio  - was in af at the last office visit with Dr Miranda, and was on amio and in rate controlled af  - can cont amio for now, though ultimately if there is not going to be an attempt at restoration of sr, it should be transitioned off, with perhaps an alternative avn blocker added  - His BP is soft will start Metoprolol 12.5mg BID with hold parameters and titrate up as BP allows (to home dose of Metoprolol Succinate 50mg daily)  - resume ac when ok with gu  - mild demand troponin in the setting of tachycardia and severe anemia, trend trop to peak  will follow.    Hematuria   - On CBI follow uro recs  - s/p 2 units PRBC.  Transfuse PRBC PRN to keep Hgb >8 given cardiac hx     Elevated trops  - trops 0.186. CKs are flat, suggesting against acute atherosclerotic plaque rupture.  - Patient is not complaining of any cardiac symptoms at this time.  - EKG showing mild ST wave depressions  - Mild demand ischemia likely in the setting of acute stress vs anemia  - Biomarker trend is not consistent with plaque rupture but rather demand ischemia likely 2/2 acute stress vs anemia. Monitor closely for the development of anginal symptoms or clinical signs of ischemia.   - Would trend trops to peak added to morning labs and timed at 1400    Aortic valve replacement ~20  years ago   - Last echo 7/2021 showing EF 60-65%  - Appears stable    RANDOLPH on CKD  - Improving   - Receiving IVF hydration and s/p 2 units PRBCs.  Pt with LE edema watch for volume overload.    - Follow Renal recs    NSVT   - Tele with 3-4 beats frequent NSVT and PVCs  - K 3.8 supplemented Mag 2.3  - resumed BB Metoprolol 12.5mg BID with hold parameters and titrate up as BP allows (home dose of Metoprolol Succinate 50mg daily)  - Monitor Electrolytes maintain K >4 and Mag >2    - Other cardiovascular workup will depend on clinical course.  - All other workup per primary team.  - Will continue to follow.    JUDY PayneCNP-BC  Cardiology   [facility] [phone]                   Impression: Nonexudative age-related macular degeneration, bilateral, early dry stage: H35.3131. Plan: Educated patient on diagnosis, in detail, explained there is no treatment for dry macular degeneration, there is a chance of progression (with progression can come VA restrictions), will continue to monitor for changes.  Patient to RTC if any sudden/new VA loss/complication 85 yo male with PMH afib (on Eliquis, s/p BILLY cardioversion 7/2021), BPH, CAD (s/p stent 2005), Aortic valve replacement (2001) presents to ED hematuria found to be in Afib with RVR.     Chronic Afib  - S/p BILLY cardioversion 7/2021  - On Eliquis however held 5 days prior to his urology procedure (?TURP) on 10/1/21  - Pt appears mildly overloaded on exam with LE edema however would hold off on diuresis in the setting of RANDOLPH  - rapid atrial fibrillation on admission likely from missed bb dose for the last few days  had dccv in the past, on amio  - was in af at the last office visit with Dr Miranda, and was on amio and in rate controlled af  - can cont amio for now, though ultimately if there is not going to be an attempt at restoration of sr, it should be transitioned off, with perhaps an alternative avn blocker added  - His BP is soft will start Metoprolol 12.5mg BID with hold parameters and titrate up as BP allows (to home dose of Metoprolol Succinate 50mg daily)  - resume ac when ok with gu  - mild demand troponin in the setting of tachycardia and severe anemia, trend trop to peak  will follow.    Hematuria   - On CBI follow uro recs  - s/p 2 units PRBC.  Transfuse PRBC PRN to keep Hgb >8 given cardiac hx     Elevated trops  - trops 0.186. CKs are flat, suggesting against acute atherosclerotic plaque rupture.  - Patient is not complaining of any cardiac symptoms at this time.  - EKG showing mild ST wave depressions  - Mild demand ischemia likely in the setting of acute stress vs anemia  - Biomarker trend is not consistent with plaque rupture but rather demand ischemia likely 2/2 acute stress vs anemia. Monitor closely for the development of anginal symptoms or clinical signs of ischemia.   - Would trend trops to peak added to morning labs and timed at 1400    Aortic valve replacement ~20  years ago   - Last echo 7/2021 showing EF 60-65%  - Appears stable    RANDOLPH on CKD  - Improving   - Receiving IVF hydration and s/p 2 units PRBCs.  Pt with LE edema watch for volume overload.    - Follow Renal recs    NSVT   - Tele with 3-4 beats frequent NSVT and PVCs  - K 3.8 supplemented Mag 2.3  - resumed BB Metoprolol 12.5mg BID with hold parameters and titrate up as BP allows (home dose of Metoprolol Succinate 50mg daily)  - Monitor Electrolytes maintain K >4 and Mag >2    - Other cardiovascular workup will depend on clinical course.  - All other workup per primary team.  - Will continue to follow.    Aminata Duffy [facility]-BC  Cardiology   Spectra [phone]    Addendum: Troponin peaked at 1.040 -->.568 now trending down no need to trend further.  Most likely demand in the setting of anemia and acute blood loss from hematuria .  Can consider ischemic workup as outpt when stabilized.

## 2023-09-26 NOTE — ED ADULT NURSE NOTE - IN THE PAST 12 MONTHS HAVE YOU USED DRUGS OTHER THAN THOSE REQUIRED FOR MEDICAL REASON?
Benzoyl Peroxide Pregnancy And Lactation Text: This medication is Pregnancy Category C. It is unknown if benzoyl peroxide is excreted in breast milk. No

## 2023-12-06 NOTE — PROGRESS NOTE ADULT - ASSESSMENT
12/06/23 1400   Referral To   Financial Resources   (Hospital Referral Service)   Community Resources   (Not Eligible for Medicaid. Applying for HCAP program through Hospital)     SW obtained [facility] paperwork for HCAP program and reviewed with pt. Pt in agreement with paperwork and signed. Completed paperwork returned to [facility]. Pt states he is retired but recently started working part-time due to his live-in girlfriend being recently unemployed and having difficulty finding work. Pt has two adult children and denies any legal marriage. BECCA provided resources for [facility], and [facility].    87 yo male with PMH afib (on Eliquis, s/p BILLY cardioversion 7/2021), BPH, CAD (s/p stent 2005), Aortic valve replacement (2001) presents to ED hematuria found to be in Afib with RVR.     Chronic Afib    - S/P BILLY cardioversion 7/2021.  On home Eliquis.  On hold prior to his urology procedure (?TURP) on 10/1/21  - Presented with hematuria, now on CBI.  Continue to hold Eliquis for now and resume when cleared by Uro  - Rapid atrial fibrillation on admission likely from missed bb dose for the last few days, overnight AF no events   - Continue BB.  On home Amio.  However, in the setting of persistent Afib is being used more as rate control.  Follows with Dr. Santillan  - Monitor electrolytes, replete to keep K>4 and Mag>2     CAD s/p stents/Elevated trops  - Trops 0.186. CKs are flat, suggesting against acute atherosclerotic plaque rupture.  - EKG showing mild ST wave depressions  - Mild demand ischemia likely in the setting of acute anemia , to fu with Dr santillan on discharge   -transfuse as per primary to keep hct > 26 given CAD   - Recent BILLY showed normal LVF, EF 60-65%, AVR, (bio), dilated LA, RVE with normal RVSF, severe SULMA, and severe TR  -BL LE edema worsening as per patient , on lasix 80mg at home has been held- resume home lasix       Torri Flores FNP-C  Cardiology NP  [facility] [phone]

## 2024-02-22 NOTE — ED PROVIDER NOTE - NS ED MD DISPO ISOLATION TYPES
[facility] ED to IP Report (EDIP)      Mental Status     Alert and Oriented x 3    Safety     None        Weight:  89.3 kg (196 lb 13.9 oz) (02/21/24 1818)    Mobility    Not assessed in ED, reported baseline Independent. Pt's O2 is poor.    Telemetry  Telemetry ordered:  Yes  NSR    Oxygen  Yes  If yes, what needs:  10L  Oxymask    Please reference ED to IP report for additional information.      Call Ioana DUNBAR at extension [phone] with any questions.    None

## 2024-07-16 NOTE — PROGRESS NOTE ADULT - PROBLEM/PLAN-1
DISPLAY PLAN FREE TEXT
[id number]
DISPLAY PLAN FREE TEXT
DISPLAY PLAN FREE TEXT

## 2024-07-30 NOTE — ED ADULT NURSE NOTE - SUICIDE SCREENING DEPRESSION
07/29/24 2100   Patient Assessment/Suction   Level of Consciousness (AVPU) alert   Respiratory Effort Normal;Unlabored   Expansion/Accessory Muscles/Retractions no use of accessory muscles   All Lung Fields Breath Sounds diminished   PRE-TX-O2   Device (Oxygen Therapy) room air   SpO2 (!) 94 %   Pulse Oximetry Type Intermittent   $ Pulse Oximetry - Multiple Charge Pulse Oximetry - Multiple   Pulse 98   Resp 18   Positioning   Positioning/Transfer Devices pillows;in use   Aerosol Therapy   $ Aerosol Therapy Charges PRN treatment not required   Education   $ Education Bronchodilator;15 min        Negative

## 2024-09-23 NOTE — H&P ADULT - PROBLEM SELECTOR PROBLEM 4
